# Patient Record
Sex: MALE | Race: WHITE | NOT HISPANIC OR LATINO | Employment: OTHER | URBAN - METROPOLITAN AREA
[De-identification: names, ages, dates, MRNs, and addresses within clinical notes are randomized per-mention and may not be internally consistent; named-entity substitution may affect disease eponyms.]

---

## 2017-04-20 RX ORDER — TAMSULOSIN HYDROCHLORIDE 0.4 MG/1
1 CAPSULE ORAL NIGHTLY
Qty: 90 CAPSULE | Refills: 0 | Status: SHIPPED | OUTPATIENT
Start: 2017-04-20 | End: 2017-05-05 | Stop reason: SDUPTHER

## 2017-04-20 RX ORDER — ROSUVASTATIN CALCIUM 5 MG/1
5 TABLET, COATED ORAL DAILY
Qty: 90 TABLET | Refills: 0 | Status: SHIPPED | OUTPATIENT
Start: 2017-04-20 | End: 2017-05-05 | Stop reason: SDUPTHER

## 2017-05-05 ENCOUNTER — OFFICE VISIT (OUTPATIENT)
Dept: FAMILY MEDICINE CLINIC | Facility: CLINIC | Age: 72
End: 2017-05-05

## 2017-05-05 VITALS
WEIGHT: 251 LBS | OXYGEN SATURATION: 97 % | HEIGHT: 66 IN | BODY MASS INDEX: 40.34 KG/M2 | TEMPERATURE: 98.3 F | DIASTOLIC BLOOD PRESSURE: 80 MMHG | HEART RATE: 74 BPM | SYSTOLIC BLOOD PRESSURE: 140 MMHG

## 2017-05-05 DIAGNOSIS — N40.1 BENIGN PROSTATIC HYPERPLASIA WITH LOWER URINARY TRACT SYMPTOMS, UNSPECIFIED MORPHOLOGY: ICD-10-CM

## 2017-05-05 DIAGNOSIS — F32.9 REACTIVE DEPRESSION: ICD-10-CM

## 2017-05-05 DIAGNOSIS — I10 ESSENTIAL HYPERTENSION: Primary | ICD-10-CM

## 2017-05-05 DIAGNOSIS — K63.5 HYPERPLASTIC COLON POLYP: ICD-10-CM

## 2017-05-05 DIAGNOSIS — E78.01 FAMILIAL HYPERCHOLESTEROLEMIA: ICD-10-CM

## 2017-05-05 DIAGNOSIS — R79.89 OTHER SPECIFIED ABNORMAL FINDINGS OF BLOOD CHEMISTRY: ICD-10-CM

## 2017-05-05 PROCEDURE — 99213 OFFICE O/P EST LOW 20 MIN: CPT | Performed by: FAMILY MEDICINE

## 2017-05-05 RX ORDER — LISINOPRIL 40 MG/1
40 TABLET ORAL DAILY
Qty: 90 TABLET | Refills: 1 | Status: SHIPPED | OUTPATIENT
Start: 2017-05-05 | End: 2017-10-16 | Stop reason: SDUPTHER

## 2017-05-05 RX ORDER — ROSUVASTATIN CALCIUM 5 MG/1
5 TABLET, COATED ORAL DAILY
Qty: 90 TABLET | Refills: 1 | Status: SHIPPED | OUTPATIENT
Start: 2017-05-05 | End: 2017-10-16 | Stop reason: SDUPTHER

## 2017-05-05 RX ORDER — SERTRALINE HYDROCHLORIDE 100 MG/1
50 TABLET, FILM COATED ORAL DAILY
Qty: 45 TABLET | Refills: 1 | Status: SHIPPED | OUTPATIENT
Start: 2017-05-05 | End: 2017-10-16 | Stop reason: SDUPTHER

## 2017-05-05 RX ORDER — TAMSULOSIN HYDROCHLORIDE 0.4 MG/1
1 CAPSULE ORAL NIGHTLY
Qty: 90 CAPSULE | Refills: 1 | Status: SHIPPED | OUTPATIENT
Start: 2017-05-05 | End: 2017-11-14 | Stop reason: SDUPTHER

## 2017-05-05 RX ORDER — TRIAMTERENE AND HYDROCHLOROTHIAZIDE 37.5; 25 MG/1; MG/1
1 TABLET ORAL DAILY
Qty: 90 TABLET | Refills: 1 | Status: SHIPPED | OUTPATIENT
Start: 2017-05-05 | End: 2017-10-16 | Stop reason: SDUPTHER

## 2017-06-06 ENCOUNTER — OFFICE VISIT (OUTPATIENT)
Dept: FAMILY MEDICINE CLINIC | Facility: CLINIC | Age: 72
End: 2017-06-06

## 2017-06-06 VITALS
HEIGHT: 67 IN | SYSTOLIC BLOOD PRESSURE: 118 MMHG | OXYGEN SATURATION: 95 % | BODY MASS INDEX: 37.35 KG/M2 | DIASTOLIC BLOOD PRESSURE: 78 MMHG | TEMPERATURE: 98.6 F | WEIGHT: 238 LBS | HEART RATE: 75 BPM

## 2017-06-06 DIAGNOSIS — J20.9 ACUTE BRONCHITIS, UNSPECIFIED ORGANISM: Primary | ICD-10-CM

## 2017-06-06 DIAGNOSIS — J06.9 ACUTE URI: ICD-10-CM

## 2017-06-06 PROCEDURE — 99213 OFFICE O/P EST LOW 20 MIN: CPT | Performed by: FAMILY MEDICINE

## 2017-06-06 RX ORDER — AMOXICILLIN AND CLAVULANATE POTASSIUM 875; 125 MG/1; MG/1
1 TABLET, FILM COATED ORAL 2 TIMES DAILY
Qty: 20 TABLET | Refills: 0 | Status: SHIPPED | OUTPATIENT
Start: 2017-06-06 | End: 2018-01-18

## 2017-06-06 NOTE — PROGRESS NOTES
"  Chief Complaint   Patient presents with   • Nasal Congestion     pt is been for over a month with congestion ,cought and tightness in ears        Subjective.../HPI  Patient present today with cough and congestion. Clear to opaque white sputum.    I have reviewed the patient's medical history in detail and updated the computerized patient record.    Family History   Problem Relation Age of Onset   • Stroke Father    • Heart attack Father        Social History     Social History   • Marital status:      Spouse name: N/A   • Number of children: N/A   • Years of education: N/A     Occupational History   • Not on file.     Social History Main Topics   • Smoking status: Former Smoker   • Smokeless tobacco: Not on file   • Alcohol use Yes   • Drug use: Not on file   • Sexual activity: Not on file     Other Topics Concern   • Not on file     Social History Narrative       Review of Systems:   Review of Systems   Constitutional: Negative.    HENT: Positive for congestion and sneezing.         Ear tightness   Respiratory: Positive for cough, chest tightness and shortness of breath.    Cardiovascular: Negative.    Gastrointestinal: Negative.    Endocrine: Negative.    Genitourinary: Negative.    Musculoskeletal: Negative.    Skin: Negative.    Allergic/Immunologic: Positive for environmental allergies.   Neurological: Negative.    Hematological: Negative.    Psychiatric/Behavioral: Negative.        Objective:  Vital Signs     Vitals:    06/06/17 1503   BP: 118/78   BP Location: Right arm   Patient Position: Sitting   Pulse: 75   Temp: 98.6 °F (37 °C)   TempSrc: Oral   SpO2: 95%   Weight: 238 lb (108 kg)   Height: 67\" (170.2 cm)     Physical Exam   Constitutional: He is oriented to person, place, and time. He appears well-developed and well-nourished.   HENT:   Head: Normocephalic.   Pond / cobblestoning.bilateral pink tm with decreased light reflex   Eyes: Pupils are equal, round, and reactive to light.   Neck: Normal " range of motion.   Cardiovascular: Normal rate, regular rhythm and normal heart sounds.    Pulmonary/Chest: Effort normal and breath sounds normal.   Abdominal: Soft.   Musculoskeletal: Normal range of motion.   Neurological: He is alert and oriented to person, place, and time.   Skin: Skin is warm and dry.        Results Review:      REVIEWED AND DISCUSSED CLINICAL RESULTS WITH PATIENT                          Current Outpatient Prescriptions:   •  acetaminophen (TYLENOL) 325 MG tablet, Take 650 mg by mouth., Disp: , Rfl:   •  aspirin 81 MG tablet, Take 81 mg by mouth daily., Disp: , Rfl:   •  lisinopril (PRINIVIL,ZESTRIL) 40 MG tablet, Take 1 tablet by mouth Daily., Disp: 90 tablet, Rfl: 1  •  rosuvastatin (CRESTOR) 5 MG tablet, Take 1 tablet by mouth Daily. Give generic when available, Disp: 90 tablet, Rfl: 1  •  sertraline (ZOLOFT) 100 MG tablet, Take 0.5 tablets by mouth Daily., Disp: 45 tablet, Rfl: 1  •  tamsulosin (FLOMAX) 0.4 MG capsule 24 hr capsule, Take 1 capsule by mouth Every Night., Disp: 90 capsule, Rfl: 1  •  triamterene-hydrochlorothiazide (MAXZIDE-25) 37.5-25 MG per tablet, Take 1 tablet by mouth Daily., Disp: 90 tablet, Rfl: 1  •  VENTOLIN  (90 BASE) MCG/ACT inhaler, 2 puffs Q4H for cough and wheeze, Disp: 1 inhaler, Rfl: 0  •  amoxicillin-clavulanate (AUGMENTIN) 875-125 MG per tablet, Take 1 tablet by mouth 2 (Two) Times a Day., Disp: 20 tablet, Rfl: 0  •  benzonatate (TESSALON) 200 MG capsule, One cap po TID for cough, Disp: 30 capsule, Rfl: 1  •  guaifenesin-codeine (CHERATUSSIN AC) 100-10 MG/5ML liquid, 10 ml po Q4H prn cough, Disp: 118 mL, Rfl: 1    Procedures    Assessment/Plan     Diagnoses and all orders for this visit:    Acute bronchitis, unspecified organism    Acute URI    Other orders  -     amoxicillin-clavulanate (AUGMENTIN) 875-125 MG per tablet; Take 1 tablet by mouth 2 (Two) Times a Day.         Jony Siddiqi Jr., MD  06/06/17  3:56 PM

## 2017-09-14 DIAGNOSIS — K63.5 HYPERPLASTIC COLON POLYP: ICD-10-CM

## 2017-09-14 DIAGNOSIS — E78.01 FAMILIAL HYPERCHOLESTEROLEMIA: ICD-10-CM

## 2017-09-14 DIAGNOSIS — Z00.00 ROUTINE HEALTH MAINTENANCE: ICD-10-CM

## 2017-09-14 DIAGNOSIS — M48.061 SPINAL STENOSIS OF LUMBAR REGION: ICD-10-CM

## 2017-09-14 DIAGNOSIS — Z79.899 ENCOUNTER FOR LONG-TERM (CURRENT) USE OF MEDICATIONS: ICD-10-CM

## 2017-09-14 DIAGNOSIS — I10 ESSENTIAL HYPERTENSION: Primary | ICD-10-CM

## 2017-09-14 DIAGNOSIS — N40.1 BENIGN PROSTATIC HYPERPLASIA WITH LOWER URINARY TRACT SYMPTOMS, UNSPECIFIED MORPHOLOGY: ICD-10-CM

## 2017-09-15 LAB
ALBUMIN SERPL-MCNC: 4.2 G/DL (ref 3.5–5.2)
ALBUMIN/GLOB SERPL: 1.5 G/DL
ALP SERPL-CCNC: 54 U/L (ref 39–117)
ALT SERPL-CCNC: 24 U/L (ref 1–41)
AST SERPL-CCNC: 27 U/L (ref 1–40)
BASOPHILS # BLD AUTO: 0.07 10*3/MM3 (ref 0–0.2)
BASOPHILS NFR BLD AUTO: 1.2 % (ref 0–1.5)
BILIRUB SERPL-MCNC: 0.3 MG/DL (ref 0.1–1.2)
BUN SERPL-MCNC: 23 MG/DL (ref 8–23)
BUN/CREAT SERPL: 21.1 (ref 7–25)
CALCIUM SERPL-MCNC: 9.8 MG/DL (ref 8.6–10.5)
CHLORIDE SERPL-SCNC: 99 MMOL/L (ref 98–107)
CHOLEST SERPL-MCNC: 152 MG/DL (ref 0–200)
CO2 SERPL-SCNC: 31.9 MMOL/L (ref 22–29)
CREAT SERPL-MCNC: 1.09 MG/DL (ref 0.76–1.27)
EOSINOPHIL # BLD AUTO: 0.18 10*3/MM3 (ref 0–0.7)
EOSINOPHIL NFR BLD AUTO: 3 % (ref 0.3–6.2)
ERYTHROCYTE [DISTWIDTH] IN BLOOD BY AUTOMATED COUNT: 19 % (ref 11.5–14.5)
FT4I SERPL CALC-MCNC: 1.8 (ref 1.2–4.9)
GLOBULIN SER CALC-MCNC: 2.8 GM/DL
GLUCOSE SERPL-MCNC: 104 MG/DL (ref 65–99)
HBA1C MFR BLD: 5.41 % (ref 4.8–5.6)
HCT VFR BLD AUTO: 38.8 % (ref 40.4–52.2)
HDLC SERPL-MCNC: 59 MG/DL (ref 40–60)
HGB BLD-MCNC: 11.6 G/DL (ref 13.7–17.6)
IMM GRANULOCYTES # BLD: 0 10*3/MM3 (ref 0–0.03)
IMM GRANULOCYTES NFR BLD: 0 % (ref 0–0.5)
LDLC SERPL CALC-MCNC: 71 MG/DL (ref 0–100)
LDLC/HDLC SERPL: 1.2 {RATIO}
LYMPHOCYTES # BLD AUTO: 1.7 10*3/MM3 (ref 0.9–4.8)
LYMPHOCYTES NFR BLD AUTO: 28.5 % (ref 19.6–45.3)
MCH RBC QN AUTO: 24.8 PG (ref 27–32.7)
MCHC RBC AUTO-ENTMCNC: 29.9 G/DL (ref 32.6–36.4)
MCV RBC AUTO: 83.1 FL (ref 79.8–96.2)
MONOCYTES # BLD AUTO: 0.48 10*3/MM3 (ref 0.2–1.2)
MONOCYTES NFR BLD AUTO: 8 % (ref 5–12)
NEUTROPHILS # BLD AUTO: 3.54 10*3/MM3 (ref 1.9–8.1)
NEUTROPHILS NFR BLD AUTO: 59.3 % (ref 42.7–76)
PLATELET # BLD AUTO: 256 10*3/MM3 (ref 140–500)
POTASSIUM SERPL-SCNC: 4.6 MMOL/L (ref 3.5–5.2)
PROT SERPL-MCNC: 7 G/DL (ref 6–8.5)
PSA SERPL-MCNC: 1.88 NG/ML (ref 0–4)
RBC # BLD AUTO: 4.67 10*6/MM3 (ref 4.6–6)
SODIUM SERPL-SCNC: 141 MMOL/L (ref 136–145)
T3RU NFR SERPL: 32 % (ref 24–39)
T4 SERPL-MCNC: 5.5 UG/DL (ref 4.5–12)
TRIGL SERPL-MCNC: 112 MG/DL (ref 0–150)
TSH SERPL DL<=0.005 MIU/L-ACNC: 4.81 UIU/ML (ref 0.45–4.5)
VLDLC SERPL CALC-MCNC: 22.4 MG/DL (ref 5–40)
WBC # BLD AUTO: 5.97 10*3/MM3 (ref 4.5–10.7)

## 2017-10-16 RX ORDER — SERTRALINE HYDROCHLORIDE 100 MG/1
50 TABLET, FILM COATED ORAL DAILY
Qty: 45 TABLET | Refills: 2 | Status: SHIPPED | OUTPATIENT
Start: 2017-10-16 | End: 2018-02-12 | Stop reason: SDUPTHER

## 2017-10-16 RX ORDER — ROSUVASTATIN CALCIUM 5 MG/1
5 TABLET, COATED ORAL DAILY
Qty: 90 TABLET | Refills: 2 | Status: SHIPPED | OUTPATIENT
Start: 2017-10-16 | End: 2018-05-16 | Stop reason: SDUPTHER

## 2017-10-16 RX ORDER — TRIAMTERENE AND HYDROCHLOROTHIAZIDE 37.5; 25 MG/1; MG/1
1 TABLET ORAL DAILY
Qty: 90 TABLET | Refills: 2 | Status: SHIPPED | OUTPATIENT
Start: 2017-10-16 | End: 2018-05-16 | Stop reason: SDUPTHER

## 2017-10-16 RX ORDER — LISINOPRIL 40 MG/1
40 TABLET ORAL DAILY
Qty: 90 TABLET | Refills: 2 | Status: SHIPPED | OUTPATIENT
Start: 2017-10-16 | End: 2018-07-27 | Stop reason: SDUPTHER

## 2017-11-14 RX ORDER — TAMSULOSIN HYDROCHLORIDE 0.4 MG/1
1 CAPSULE ORAL NIGHTLY
Qty: 90 CAPSULE | Refills: 1 | Status: SHIPPED | OUTPATIENT
Start: 2017-11-14 | End: 2018-05-16 | Stop reason: SDUPTHER

## 2017-12-05 DIAGNOSIS — D64.9 ANEMIA, UNSPECIFIED TYPE: Primary | ICD-10-CM

## 2017-12-06 NOTE — PROGRESS NOTES
I told the patient results of his lab tests.  He does have some anemia.  He denies any blood in his stools other than today he had some hemorrhoidal bleeding.  He has not had that over the last several months.  He did have a colon polyp in the past when he underwent a colonoscopy by Dr. Alexander.  He does eat red meats and leafy green vegetables.  I will refer him to gastroenterology for further evaluation.

## 2018-01-18 ENCOUNTER — OFFICE VISIT (OUTPATIENT)
Dept: GASTROENTEROLOGY | Facility: CLINIC | Age: 73
End: 2018-01-18

## 2018-01-18 VITALS
HEIGHT: 66 IN | BODY MASS INDEX: 38.99 KG/M2 | DIASTOLIC BLOOD PRESSURE: 80 MMHG | SYSTOLIC BLOOD PRESSURE: 132 MMHG | WEIGHT: 242.6 LBS | TEMPERATURE: 97.7 F

## 2018-01-18 DIAGNOSIS — D50.0 IRON DEFICIENCY ANEMIA DUE TO CHRONIC BLOOD LOSS: Primary | ICD-10-CM

## 2018-01-18 DIAGNOSIS — K21.9 GASTROESOPHAGEAL REFLUX DISEASE, ESOPHAGITIS PRESENCE NOT SPECIFIED: ICD-10-CM

## 2018-01-18 PROCEDURE — 99204 OFFICE O/P NEW MOD 45 MIN: CPT | Performed by: INTERNAL MEDICINE

## 2018-01-18 NOTE — PROGRESS NOTES
Chief Complaint   Patient presents with   • Anemia       History of Present Illness: 72 yr old male here for recurrent iron def anemia. No rectal bleeding or melena, though he had small amount of rectal bleeding when passing a hard stool. No abdominal or chest pain. He takes Prevacid OTC one/day for heartburn.  No dysphagia. He was on ibuprofen prn but has backed off of that. No nausea or vomting. No diarrhea, sometimes constipation. His weight is stable.     Past Medical History:   Diagnosis Date   • Allergic    • Anxiety    • High cholesterol    • Hypertension        Past Surgical History:   Procedure Laterality Date   • COLONOSCOPY  approx 2012    • TONSILLECTOMY           Current Outpatient Prescriptions:   •  acetaminophen (TYLENOL) 325 MG tablet, Take 650 mg by mouth., Disp: , Rfl:   •  aspirin 81 MG tablet, Take 81 mg by mouth daily., Disp: , Rfl:   •  lisinopril (PRINIVIL,ZESTRIL) 40 MG tablet, Take 1 tablet by mouth Daily., Disp: 90 tablet, Rfl: 2  •  rosuvastatin (CRESTOR) 5 MG tablet, Take 1 tablet by mouth Daily. Give generic when available, Disp: 90 tablet, Rfl: 2  •  sertraline (ZOLOFT) 100 MG tablet, Take 0.5 tablets by mouth Daily., Disp: 45 tablet, Rfl: 2  •  tamsulosin (FLOMAX) 0.4 MG capsule 24 hr capsule, Take 1 capsule by mouth Every Night., Disp: 90 capsule, Rfl: 1  •  triamterene-hydrochlorothiazide (MAXZIDE-25) 37.5-25 MG per tablet, Take 1 tablet by mouth Daily., Disp: 90 tablet, Rfl: 2  •  VENTOLIN  (90 BASE) MCG/ACT inhaler, 2 puffs Q4H for cough and wheeze, Disp: 1 inhaler, Rfl: 0  •  benzonatate (TESSALON) 200 MG capsule, One cap po TID for cough, Disp: 30 capsule, Rfl: 1  •  ciprofloxacin (CILOXAN) 0.3 % ophthalmic solution, Administer 1 drop into the left eye Every 4 (Four) Hours., Disp: 5 mL, Rfl: 0  •  guaifenesin-codeine (CHERATUSSIN AC) 100-10 MG/5ML liquid, 10 ml po Q4H prn cough, Disp: 118 mL, Rfl: 1    Allergies   Allergen Reactions   • Amoxicillin Itching   • Levaquin  [Levofloxacin] Nausea And Vomiting       Family History   Problem Relation Age of Onset   • Stroke Father    • Heart attack Father        Social History     Social History   • Marital status:      Spouse name: N/A   • Number of children: N/A   • Years of education: N/A     Occupational History   • Not on file.     Social History Main Topics   • Smoking status: Former Smoker   • Smokeless tobacco: Not on file   • Alcohol use Yes   • Drug use: Not on file   • Sexual activity: Not on file     Other Topics Concern   • Not on file     Social History Narrative       Review of Systems   Endocrine: Positive for polyphagia.   All other systems reviewed and are negative.      Vitals:    01/18/18 0926   BP: 132/80   Temp: 97.7 °F (36.5 °C)       Physical Exam   Constitutional: He is oriented to person, place, and time. He appears well-developed and well-nourished. No distress.   HENT:   Head: Normocephalic and atraumatic. Hair is normal.   Right Ear: Hearing, tympanic membrane, external ear and ear canal normal.   Left Ear: Hearing, tympanic membrane, external ear and ear canal normal.   Nose: No sinus tenderness or nasal deformity.   Mouth/Throat: Uvula is midline, oropharynx is clear and moist and mucous membranes are normal. No oral lesions. No uvula swelling.   Eyes: Conjunctivae, EOM and lids are normal. Pupils are equal, round, and reactive to light. Right eye exhibits no discharge. Left eye exhibits no discharge. No scleral icterus. Right eye exhibits normal extraocular motion and no nystagmus. Left eye exhibits normal extraocular motion and no nystagmus.   Neck: Normal range of motion. Neck supple. No JVD present. No thyromegaly present.   Cardiovascular: Normal rate, regular rhythm, normal heart sounds, intact distal pulses and normal pulses.  Exam reveals no gallop.    No murmur heard.  Pulmonary/Chest: Effort normal and breath sounds normal. No respiratory distress. He has no wheezes. He has no rales. He  exhibits no tenderness.   Abdominal: Soft. Bowel sounds are normal. He exhibits no distension and no mass. There is no tenderness. There is no guarding. No hernia.   Genitourinary: Rectal exam shows guaiac negative stool.   Genitourinary Comments: Normal anorectal exxam.   Musculoskeletal: Normal range of motion. He exhibits no edema, tenderness or deformity.   Lymphadenopathy:     He has no cervical adenopathy.   Neurological: He is alert and oriented to person, place, and time. He has normal reflexes. He displays normal reflexes. No cranial nerve deficit. He exhibits normal muscle tone. Coordination normal.   Skin: Skin is warm and dry. No rash noted. He is not diaphoretic.   Psychiatric: He has a normal mood and affect. His behavior is normal. Judgment and thought content normal.   Vitals reviewed.      Dick was seen today for anemia.    Diagnoses and all orders for this visit:    Iron deficiency anemia due to chronic blood loss  -     Case Request; Standing  -     Case Request  -     CBC & Differential  -     Comprehensive Metabolic Panel  -     Celiac Ab tTG DGP TIgA  -     Ferritin  -     Iron Profile  -     Vitamin B12  -     Folate RBC  -     Reticulocytes    Gastroesophageal reflux disease, esophagitis presence not specified  -     CBC & Differential  -     Comprehensive Metabolic Panel  -     Celiac Ab tTG DGP TIgA  -     Ferritin  -     Iron Profile  -     Vitamin B12  -     Folate RBC  -     Reticulocytes    Other orders  -     Implement Anesthesia orders day of procedure.; Standing  -     Obtain informed consent; Standing  -     Verify bowel prep was successful; Standing  -     Give tap water enema if bowel prep was insufficient; Standing     Assessment:  1) Recurrent iron deficiency anemia.   2) h/o colon polyp  3) GERD    Recommendations:  1) EGD and Colonoscopy.  2) Labs and anemia labs      No Follow-up on file.    Usman Roper MD  1/18/2018

## 2018-01-19 LAB
ALBUMIN SERPL-MCNC: 4.4 G/DL (ref 3.5–5.2)
ALBUMIN/GLOB SERPL: 1.6 G/DL
ALP SERPL-CCNC: 57 U/L (ref 39–117)
ALT SERPL-CCNC: 21 U/L (ref 1–41)
AST SERPL-CCNC: 17 U/L (ref 1–40)
BASOPHILS # BLD AUTO: 0.04 10*3/MM3 (ref 0–0.2)
BASOPHILS NFR BLD AUTO: 0.5 % (ref 0–1.5)
BILIRUB SERPL-MCNC: 0.4 MG/DL (ref 0.1–1.2)
BUN SERPL-MCNC: 28 MG/DL (ref 8–23)
BUN/CREAT SERPL: 23.7 (ref 7–25)
CALCIUM SERPL-MCNC: 9.2 MG/DL (ref 8.6–10.5)
CHLORIDE SERPL-SCNC: 98 MMOL/L (ref 98–107)
CO2 SERPL-SCNC: 33.8 MMOL/L (ref 22–29)
CREAT SERPL-MCNC: 1.18 MG/DL (ref 0.76–1.27)
EOSINOPHIL # BLD AUTO: 0.15 10*3/MM3 (ref 0–0.7)
EOSINOPHIL NFR BLD AUTO: 1.9 % (ref 0.3–6.2)
ERYTHROCYTE [DISTWIDTH] IN BLOOD BY AUTOMATED COUNT: 16.3 % (ref 11.5–14.5)
FERRITIN SERPL-MCNC: 21.43 NG/ML (ref 30–400)
FOLATE BLD-MCNC: >620 NG/ML
FOLATE RBC-MCNC: >1439 NG/ML
GLIADIN PEPTIDE IGA SER-ACNC: 3 UNITS (ref 0–19)
GLIADIN PEPTIDE IGG SER-ACNC: 3 UNITS (ref 0–19)
GLOBULIN SER CALC-MCNC: 2.8 GM/DL
GLUCOSE SERPL-MCNC: 87 MG/DL (ref 65–99)
HCT VFR BLD AUTO: 43.1 % (ref 40.4–52.2)
HGB BLD-MCNC: 13.8 G/DL (ref 13.7–17.6)
IGA SERPL-MCNC: 171 MG/DL (ref 61–437)
IMM GRANULOCYTES # BLD: 0.02 10*3/MM3 (ref 0–0.03)
IMM GRANULOCYTES NFR BLD: 0.3 % (ref 0–0.5)
IRON SATN MFR SERPL: 22 % (ref 20–50)
IRON SERPL-MCNC: 89 MCG/DL (ref 59–158)
LYMPHOCYTES # BLD AUTO: 1.54 10*3/MM3 (ref 0.9–4.8)
LYMPHOCYTES NFR BLD AUTO: 19.5 % (ref 19.6–45.3)
MCH RBC QN AUTO: 28 PG (ref 27–32.7)
MCHC RBC AUTO-ENTMCNC: 32 G/DL (ref 32.6–36.4)
MCV RBC AUTO: 87.4 FL (ref 79.8–96.2)
MONOCYTES # BLD AUTO: 0.68 10*3/MM3 (ref 0.2–1.2)
MONOCYTES NFR BLD AUTO: 8.6 % (ref 5–12)
NEUTROPHILS # BLD AUTO: 5.47 10*3/MM3 (ref 1.9–8.1)
NEUTROPHILS NFR BLD AUTO: 69.2 % (ref 42.7–76)
PLATELET # BLD AUTO: 230 10*3/MM3 (ref 140–500)
POTASSIUM SERPL-SCNC: 4.3 MMOL/L (ref 3.5–5.2)
PROT SERPL-MCNC: 7.2 G/DL (ref 6–8.5)
RBC # BLD AUTO: 4.93 10*6/MM3 (ref 4.6–6)
RETICS/RBC NFR AUTO: 1.01 % (ref 0.5–1.5)
SODIUM SERPL-SCNC: 140 MMOL/L (ref 136–145)
TIBC SERPL-MCNC: 404 MCG/DL
TTG IGA SER-ACNC: <2 U/ML (ref 0–3)
TTG IGG SER-ACNC: <2 U/ML (ref 0–5)
UIBC SERPL-MCNC: 315 MCG/DL
VIT B12 SERPL-MCNC: 447 PG/ML (ref 211–946)
WBC # BLD AUTO: 7.9 10*3/MM3 (ref 4.5–10.7)

## 2018-01-21 NOTE — PROGRESS NOTES
Tell him that his labs show that he is iron deficient but not anemic. I would recommend that he take some over the counter iron pills. He could take iron sulfate 325 mg one pill daily and I would take about two months worth of over the counter iron pills. [email or web address]

## 2018-01-22 ENCOUNTER — TELEPHONE (OUTPATIENT)
Dept: GASTROENTEROLOGY | Facility: CLINIC | Age: 73
End: 2018-01-22

## 2018-01-22 NOTE — TELEPHONE ENCOUNTER
----- Message from Usman Roper MD sent at 1/21/2018  5:16 PM EST -----  Tell him that his labs show that he is iron deficient but not anemic. I would recommend that he take some over the counter iron pills. He could take iron sulfate 325 mg one pill daily and I would take about two months worth of over the counter iron pills. kj

## 2018-01-23 NOTE — TELEPHONE ENCOUNTER
Call returned to pt.  Advise per DR Roper that labs show that iron deficient, but not anemic.  Recommend OTC iron sulfate 325 mg 1 tab daily for 2 mo's.  Pt verb understanding.

## 2018-02-05 ENCOUNTER — TELEPHONE (OUTPATIENT)
Dept: GASTROENTEROLOGY | Facility: CLINIC | Age: 73
End: 2018-02-05

## 2018-02-05 NOTE — TELEPHONE ENCOUNTER
----- Message from Trisha Paredes sent at 2/5/2018  8:55 AM EST -----  Regarding: PT CALLED - QUESTION  Contact: [phone]  PT DIDN'T STOP HIS IRON FOR 5 DAYS. HE TOOK YESTERDAY. [facility] FOR PROCEDURE 10AM TOMORROW. SHOULD HE R/S?

## 2018-02-05 NOTE — TELEPHONE ENCOUNTER
Dr Roper covering in hospital.  Spoke with Dr Peck who advised that the pt should be fine for his scope tomorrow even though he had taken his iron till yesterday.      Called pt and advised of the above. Pt verb understanding and states he will not take the iron today.  Update sent to Dr Roper.

## 2018-02-06 ENCOUNTER — ANESTHESIA EVENT (OUTPATIENT)
Dept: GASTROENTEROLOGY | Facility: HOSPITAL | Age: 73
End: 2018-02-06

## 2018-02-06 ENCOUNTER — ANESTHESIA (OUTPATIENT)
Dept: GASTROENTEROLOGY | Facility: HOSPITAL | Age: 73
End: 2018-02-06

## 2018-02-06 ENCOUNTER — HOSPITAL ENCOUNTER (OUTPATIENT)
Facility: HOSPITAL | Age: 73
Setting detail: HOSPITAL OUTPATIENT SURGERY
Discharge: HOME | End: 2018-02-06
Attending: INTERNAL MEDICINE | Admitting: INTERNAL MEDICINE

## 2018-02-06 VITALS
RESPIRATION RATE: 15 BRPM | DIASTOLIC BLOOD PRESSURE: 92 MMHG | WEIGHT: 237 LBS | HEIGHT: 66 IN | OXYGEN SATURATION: 97 % | HEART RATE: 82 BPM | SYSTOLIC BLOOD PRESSURE: 142 MMHG | BODY MASS INDEX: 38.09 KG/M2 | TEMPERATURE: 98 F

## 2018-02-06 DIAGNOSIS — D50.0 IRON DEFICIENCY ANEMIA DUE TO CHRONIC BLOOD LOSS: ICD-10-CM

## 2018-02-06 PROCEDURE — 25010000002 PROPOFOL 10 MG/ML EMULSION: Performed by: ANESTHESIOLOGY

## 2018-02-06 PROCEDURE — 45380 COLONOSCOPY AND BIOPSY: CPT | Performed by: INTERNAL MEDICINE

## 2018-02-06 PROCEDURE — 88312 SPECIAL STAINS GROUP 1: CPT | Performed by: INTERNAL MEDICINE

## 2018-02-06 PROCEDURE — 88305 TISSUE EXAM BY PATHOLOGIST: CPT | Performed by: INTERNAL MEDICINE

## 2018-02-06 PROCEDURE — 43239 EGD BIOPSY SINGLE/MULTIPLE: CPT | Performed by: INTERNAL MEDICINE

## 2018-02-06 PROCEDURE — 87081 CULTURE SCREEN ONLY: CPT | Performed by: INTERNAL MEDICINE

## 2018-02-06 RX ORDER — SODIUM CHLORIDE, SODIUM LACTATE, POTASSIUM CHLORIDE, CALCIUM CHLORIDE 600; 310; 30; 20 MG/100ML; MG/100ML; MG/100ML; MG/100ML
1000 INJECTION, SOLUTION INTRAVENOUS CONTINUOUS PRN
Status: DISCONTINUED | OUTPATIENT
Start: 2018-02-06 | End: 2018-02-06 | Stop reason: HOSPADM

## 2018-02-06 RX ORDER — PROPOFOL 10 MG/ML
VIAL (ML) INTRAVENOUS AS NEEDED
Status: DISCONTINUED | OUTPATIENT
Start: 2018-02-06 | End: 2018-02-06 | Stop reason: SURG

## 2018-02-06 RX ORDER — FERROUS SULFATE 325(65) MG
325 TABLET ORAL
COMMUNITY
End: 2018-02-12

## 2018-02-06 RX ORDER — LIDOCAINE HYDROCHLORIDE 20 MG/ML
INJECTION, SOLUTION INFILTRATION; PERINEURAL AS NEEDED
Status: DISCONTINUED | OUTPATIENT
Start: 2018-02-06 | End: 2018-02-06 | Stop reason: SURG

## 2018-02-06 RX ORDER — LIDOCAINE HYDROCHLORIDE 10 MG/ML
0.5 INJECTION, SOLUTION INFILTRATION; PERINEURAL ONCE AS NEEDED
Status: DISCONTINUED | OUTPATIENT
Start: 2018-02-06 | End: 2018-02-06 | Stop reason: HOSPADM

## 2018-02-06 RX ORDER — PROPOFOL 10 MG/ML
VIAL (ML) INTRAVENOUS CONTINUOUS PRN
Status: DISCONTINUED | OUTPATIENT
Start: 2018-02-06 | End: 2018-02-06 | Stop reason: SURG

## 2018-02-06 RX ORDER — SODIUM CHLORIDE 0.9 % (FLUSH) 0.9 %
3 SYRINGE (ML) INJECTION AS NEEDED
Status: DISCONTINUED | OUTPATIENT
Start: 2018-02-06 | End: 2018-02-06 | Stop reason: HOSPADM

## 2018-02-06 RX ADMIN — PROPOFOL 140 MCG/KG/MIN: 10 INJECTION, EMULSION INTRAVENOUS at 09:56

## 2018-02-06 RX ADMIN — PROPOFOL 150 MG: 10 INJECTION, EMULSION INTRAVENOUS at 09:56

## 2018-02-06 RX ADMIN — SODIUM CHLORIDE, POTASSIUM CHLORIDE, SODIUM LACTATE AND CALCIUM CHLORIDE 1000 ML: 600; 310; 30; 20 INJECTION, SOLUTION INTRAVENOUS at 09:43

## 2018-02-06 RX ADMIN — LIDOCAINE HYDROCHLORIDE 50 MG: 20 INJECTION, SOLUTION INFILTRATION; PERINEURAL at 09:56

## 2018-02-06 NOTE — PLAN OF CARE
Problem: Patient Care Overview (Adult)  Goal: Adult Individualization and Mutuality  Outcome: Ongoing (interventions implemented as appropriate)   02/06/18 0933   Individualization   Patient Specific Interventions denies     Goal: Discharge Needs Assessment  Outcome: Ongoing (interventions implemented as appropriate)   02/06/18 0933   Discharge Needs Assessment   Concerns To Be Addressed no discharge needs identified   Discharge Disposition home or self-care   Self-Care   Equipment Currently Used at Home none       Problem: GI Endoscopy (Adult)  Goal: Signs and Symptoms of Listed Potential Problems Will be Absent or Manageable (GI Endoscopy)  Outcome: Ongoing (interventions implemented as appropriate)   02/06/18 0933   GI Endoscopy   Problems Assessed (GI Endoscopy) pain;bleeding;fluid imbalance;hypoxia/hypoxemia   Problems Present (GI Endoscopy) none

## 2018-02-06 NOTE — ANESTHESIA PREPROCEDURE EVALUATION
Anesthesia Evaluation     Patient summary reviewed                Airway   Mallampati: III  Dental      Pulmonary     breath sounds clear to auscultation  Cardiovascular   Exercise tolerance: good (4-7 METS)    Rhythm: regular  Rate: normal        Neuro/Psych  GI/Hepatic/Renal/Endo      Musculoskeletal     Abdominal    Substance History      OB/GYN          Other                        Anesthesia Plan    ASA 2     MAC     intravenous induction   Anesthetic plan and risks discussed with patient.

## 2018-02-06 NOTE — ANESTHESIA POSTPROCEDURE EVALUATION
Patient: Dick Galindo    Procedure Summary     Date Anesthesia Start Anesthesia Stop Room / Location    02/06/18 0952 1031  [facility] /  [facility]       Procedure Diagnosis Surgeon Provider    ESOPHAGOGASTRODUODENOSCOPY WITH COLD BIOPSIES (N/A Esophagus); COLONOSCOPY INTO CEECUM AND TERMINAL MILEUM WITH COLD BIOPSY POLYPECTOMIES & COLD BIOIPSIES (N/A ) Iron deficiency anemia due to chronic blood loss  (Iron deficiency anemia due to chronic blood loss [D50.0]) MD Pranay Christianson MD          Anesthesia Type: MAC  Last vitals  BP   133/88 (02/06/18 1036)   Temp   36.6 °C (97.9 °F) (02/06/18 0935)   Pulse   88 (02/06/18 1036)   Resp   18 (02/06/18 1036)     SpO2   98 % (02/06/18 1036)     Post Anesthesia Care and Evaluation    Patient location during evaluation: PACU  Patient participation: complete - patient participated  Level of consciousness: awake and alert  Pain score: 0  Pain management: adequate  Airway patency: patent  Anesthetic complications: No anesthetic complications    Cardiovascular status: acceptable  Respiratory status: acceptable  Hydration status: acceptable

## 2018-02-06 NOTE — H&P
Chief Complaint   Patient presents with   • Anemia         History of Present Illness: 72 yr old male here for recurrent iron def anemia. No rectal bleeding or melena, though he had small amount of rectal bleeding when passing a hard stool. No abdominal or chest pain. He takes Prevacid OTC one/day for heartburn.  No dysphagia. He was on ibuprofen prn but has backed off of that. No nausea or vomting. No diarrhea, sometimes constipation. His weight is stable.       Medical History         Past Medical History:   Diagnosis Date   • Allergic     • Anxiety     • High cholesterol     • Hypertension               Surgical History    Past Surgical History:   Procedure Laterality Date   • COLONOSCOPY   approx 2012    • TONSILLECTOMY                   Current Outpatient Prescriptions:   •  acetaminophen (TYLENOL) 325 MG tablet, Take 650 mg by mouth., Disp: , Rfl:   •  aspirin 81 MG tablet, Take 81 mg by mouth daily., Disp: , Rfl:   •  lisinopril (PRINIVIL,ZESTRIL) 40 MG tablet, Take 1 tablet by mouth Daily., Disp: 90 tablet, Rfl: 2  •  rosuvastatin (CRESTOR) 5 MG tablet, Take 1 tablet by mouth Daily. Give generic when available, Disp: 90 tablet, Rfl: 2  •  sertraline (ZOLOFT) 100 MG tablet, Take 0.5 tablets by mouth Daily., Disp: 45 tablet, Rfl: 2  •  tamsulosin (FLOMAX) 0.4 MG capsule 24 hr capsule, Take 1 capsule by mouth Every Night., Disp: 90 capsule, Rfl: 1  •  triamterene-hydrochlorothiazide (MAXZIDE-25) 37.5-25 MG per tablet, Take 1 tablet by mouth Daily., Disp: 90 tablet, Rfl: 2  •  VENTOLIN  (90 BASE) MCG/ACT inhaler, 2 puffs Q4H for cough and wheeze, Disp: 1 inhaler, Rfl: 0  •  benzonatate (TESSALON) 200 MG capsule, One cap po TID for cough, Disp: 30 capsule, Rfl: 1  •  ciprofloxacin (CILOXAN) 0.3 % ophthalmic solution, Administer 1 drop into the left eye Every 4 (Four) Hours., Disp: 5 mL, Rfl: 0  •  guaifenesin-codeine (CHERATUSSIN AC) 100-10 MG/5ML liquid, 10 ml po Q4H prn cough, Disp: 118 mL, Rfl: 1           Allergies   Allergen Reactions   • Amoxicillin Itching   • Levaquin [Levofloxacin] Nausea And Vomiting               Family History   Problem Relation Age of Onset   • Stroke Father     • Heart attack Father            Social History    Social History            Social History   • Marital status:        Spouse name: N/A   • Number of children: N/A   • Years of education: N/A          Occupational History   • Not on file.           Social History Main Topics   • Smoking status: Former Smoker   • Smokeless tobacco: Not on file   • Alcohol use Yes   • Drug use: Not on file   • Sexual activity: Not on file           Other Topics Concern   • Not on file      Social History Narrative            Review of Systems   Endocrine: Positive for polyphagia.   All other systems reviewed and are negative.            Vitals:     01/18/18 0926   BP: 132/80   Temp: 97.7 °F (36.5 °C)         Physical Exam   Constitutional: He is oriented to person, place, and time. He appears well-developed and well-nourished. No distress.   HENT:   Head: Normocephalic and atraumatic. Hair is normal.   Right Ear: Hearing, tympanic membrane, external ear and ear canal normal.   Left Ear: Hearing, tympanic membrane, external ear and ear canal normal.   Nose: No sinus tenderness or nasal deformity.   Mouth/Throat: Uvula is midline, oropharynx is clear and moist and mucous membranes are normal. No oral lesions. No uvula swelling.   Eyes: Conjunctivae, EOM and lids are normal. Pupils are equal, round, and reactive to light. Right eye exhibits no discharge. Left eye exhibits no discharge. No scleral icterus. Right eye exhibits normal extraocular motion and no nystagmus. Left eye exhibits normal extraocular motion and no nystagmus.   Neck: Normal range of motion. Neck supple. No JVD present. No thyromegaly present.   Cardiovascular: Normal rate, regular rhythm, normal heart sounds, intact distal pulses and normal pulses.  Exam reveals no gallop.     No murmur heard.  Pulmonary/Chest: Effort normal and breath sounds normal. No respiratory distress. He has no wheezes. He has no rales. He exhibits no tenderness.   Abdominal: Soft. Bowel sounds are normal. He exhibits no distension and no mass. There is no tenderness. There is no guarding. No hernia.   Genitourinary: Rectal exam shows guaiac negative stool.   Genitourinary Comments: Normal anorectal exxam.   Musculoskeletal: Normal range of motion. He exhibits no edema, tenderness or deformity.   Lymphadenopathy:     He has no cervical adenopathy.   Neurological: He is alert and oriented to person, place, and time. He has normal reflexes. He displays normal reflexes. No cranial nerve deficit. He exhibits normal muscle tone. Coordination normal.   Skin: Skin is warm and dry. No rash noted. He is not diaphoretic.   Psychiatric: He has a normal mood and affect. His behavior is normal. Judgment and thought content normal.   Vitals reviewed.        Dick was seen today for anemia.     Diagnoses and all orders for this visit:     Iron deficiency anemia due to chronic blood loss  -     Case Request; Standing  -     Case Request  -     CBC & Differential  -     Comprehensive Metabolic Panel  -     Celiac Ab tTG DGP TIgA  -     Ferritin  -     Iron Profile  -     Vitamin B12  -     Folate RBC  -     Reticulocytes     Gastroesophageal reflux disease, esophagitis presence not specified  -     CBC & Differential  -     Comprehensive Metabolic Panel  -     Celiac Ab tTG DGP TIgA  -     Ferritin  -     Iron Profile  -     Vitamin B12  -     Folate RBC  -     Reticulocytes     Other orders  -     Implement Anesthesia orders day of procedure.; Standing  -     Obtain informed consent; Standing  -     Verify bowel prep was successful; Standing  -     Give tap water enema if bowel prep was insufficient; Standing     Assessment:  1) Recurrent iron deficiency anemia.   2) h/o colon polyp  3) GERD     Recommendations:  1) EGD and  Colonoscopy.  2) Labs and anemia labs        No Follow-up on file.     2/6/18 - No change from the above H and P.  Usman Roper MD

## 2018-02-07 LAB
CYTO UR: NORMAL
LAB AP CASE REPORT: NORMAL
Lab: NORMAL
PATH REPORT.FINAL DX SPEC: NORMAL
PATH REPORT.GROSS SPEC: NORMAL

## 2018-02-08 LAB — UREASE TISS QL: NEGATIVE

## 2018-02-12 ENCOUNTER — OFFICE VISIT (OUTPATIENT)
Dept: FAMILY MEDICINE CLINIC | Facility: CLINIC | Age: 73
End: 2018-02-12

## 2018-02-12 VITALS
HEART RATE: 77 BPM | OXYGEN SATURATION: 94 % | DIASTOLIC BLOOD PRESSURE: 68 MMHG | HEIGHT: 66 IN | WEIGHT: 245.9 LBS | TEMPERATURE: 98.2 F | SYSTOLIC BLOOD PRESSURE: 134 MMHG | BODY MASS INDEX: 39.52 KG/M2

## 2018-02-12 DIAGNOSIS — F33.42 RECURRENT MAJOR DEPRESSIVE DISORDER, IN FULL REMISSION (HCC): ICD-10-CM

## 2018-02-12 DIAGNOSIS — D50.0 IRON DEFICIENCY ANEMIA DUE TO CHRONIC BLOOD LOSS: ICD-10-CM

## 2018-02-12 DIAGNOSIS — I10 ESSENTIAL HYPERTENSION: ICD-10-CM

## 2018-02-12 DIAGNOSIS — K21.9 GASTROESOPHAGEAL REFLUX DISEASE WITHOUT ESOPHAGITIS: ICD-10-CM

## 2018-02-12 DIAGNOSIS — E78.01 FAMILIAL HYPERCHOLESTEROLEMIA: Primary | ICD-10-CM

## 2018-02-12 DIAGNOSIS — E03.8 TSH (THYROID-STIMULATING HORMONE DEFICIENCY): ICD-10-CM

## 2018-02-12 DIAGNOSIS — R06.83 SNORING: ICD-10-CM

## 2018-02-12 DIAGNOSIS — R39.9 LOWER URINARY TRACT SYMPTOMS (LUTS): ICD-10-CM

## 2018-02-12 PROCEDURE — 90670 PCV13 VACCINE IM: CPT | Performed by: FAMILY MEDICINE

## 2018-02-12 PROCEDURE — G0009 ADMIN PNEUMOCOCCAL VACCINE: HCPCS | Performed by: FAMILY MEDICINE

## 2018-02-12 PROCEDURE — 99214 OFFICE O/P EST MOD 30 MIN: CPT | Performed by: FAMILY MEDICINE

## 2018-02-12 RX ORDER — LANSOPRAZOLE 30 MG/1
30 CAPSULE, DELAYED RELEASE ORAL DAILY
COMMUNITY
End: 2018-05-16

## 2018-02-12 NOTE — PROGRESS NOTES
Subjective   Dick Galindo is a 72 y.o. male.     Establish Care (trans from Dr Siddiqi, Pt is not fasting, talk about low iron, blood work he had done and just had colonoscopy done); Hypertension; Hyperlipidemia; and Earache (bilateral)    History of Present Illness    Hypertension follow up. Doing well with current medication which he is taking as directed. No known high or low blood pressure episodes. No cardiovascular or neurological symptoms. Today's BP: 134/68.      Hyperlipidemia follow up. He is taking statin medication without complaint. No myopathy symptoms.     Last lipid panel:   Lab Results   Component Value Date    HDL 59 09/14/2017     Lab Results   Component Value Date    LDL 71 09/14/2017     Lab Results   Component Value Date    TRIG 112 09/14/2017     Iron deficiency anemia.  He had a hemoglobin of 11 back in September.  It normalized in January.  His previous family doctor sent him for colonoscopy.  Colonoscopy showed no source of bleeding.  Just some polyps.  The iron studies were normal exception of a slightly low ferritin but the iron level and saturation was normal.  He was placed on iron tablets daily after the colonoscopy.  However prior to the colonoscopy is hemoglobin was normal.  He does not recall any rectal bleeding episodes other than occasional hemorrhoids.    During the colonoscopy the anesthesiologist noted possible sleep apnea issues.  Patient does snore.    Depression.  In remission.  Long-standing.  He continues on maintenance Zoloft 50 mg a day.    Lower urinary tract symptoms.  Mostly from a large prostate reportedly.  States he has trouble initiating stream.  He also takes a diuretic in addition to the tamsulosin.    Elevated TSH are normal T4 a few months ago.    GERD.  Long-standing Prevacid use.  Recent upper endoscopy overall normal.  No bleeding found.  There was some mild erythema of the gastric mucosa.    The following portions of the patient's history were reviewed  "and updated as appropriate: allergies, current medications, past family history, past medical history, past social history, past surgical history and problem list.      Review of Systems   Constitutional: Negative.    Respiratory: Negative.    Cardiovascular: Negative.    Genitourinary: Negative for difficulty urinating.   Musculoskeletal: Negative.    Neurological: Negative.    Psychiatric/Behavioral: Negative.        Objective   Blood pressure 134/68, pulse 77, temperature 98.2 °F (36.8 °C), temperature source Oral, height 167.6 cm (65.98\"), weight 112 kg (245 lb 14.4 oz), SpO2 94 %.  Physical Exam   Constitutional: He appears well-developed and well-nourished. No distress.   Neck: No thyromegaly present.   Cardiovascular: Normal rate, regular rhythm, normal heart sounds and intact distal pulses.    Pulmonary/Chest: Effort normal and breath sounds normal.   Musculoskeletal: He exhibits no edema.   Skin: Skin is warm and dry.   Psychiatric: He has a normal mood and affect. His behavior is normal. Judgment and thought content normal.   Nursing note and vitals reviewed.      Assessment/Plan   Dick was seen today for establish care, hypertension, hyperlipidemia and earache.    Diagnoses and all orders for this visit:    Familial hypercholesterolemia    Essential hypertension    Lower urinary tract symptoms (LUTS)    Recurrent major depressive disorder, in full remission    Gastroesophageal reflux disease without esophagitis    Snoring  -     Ambulatory Referral to Sleep Medicine    TSH (thyroid-stimulating hormone deficiency)    Iron deficiency anemia due to chronic blood loss    Other orders  -     sertraline (ZOLOFT) 50 MG tablet; Take 1 tablet by mouth Daily.        Hyperlipidemia.  Continue Crestor.    Hypertension.  He continues the diuretic and the lisinopril.    Lower uterine tract symptoms with a large prostate.  PSA normal last year.  Continue the diuretic with the tamsulosin for now.  To consider pulling " back on the diuretic with worsening urinary symptoms.  Although 3 great job with his blood pressure.    Depression.  Remission.  Continue sertraline.  This can be a cause of GI bleed.  If he has recurrent anemic issues, to consider stopping the sertraline.    Elevated TSH.  Asymptomatic.  Recheck.    Iron deficiency, with resolved anemia.  He can stop the iron supplementation.  I want to recheck his iron studies prior to next visit in 3 months.    Snoring.  Probable sleep apnea.  Referral to sleep medicine.    GERD.  Recommend cutting down the Prevacid to every other day or every third day.

## 2018-02-12 NOTE — PROGRESS NOTES
Tell him that pathology from the EGD looked okay.  The colon polyps that were removed were not cancerous and only 1 was precancerous.  The red fold that was biopsied was also not cancerous and not precancerous, which is good.  I would recommend a repeat colonoscopy in 2 years.

## 2018-02-14 ENCOUNTER — TELEPHONE (OUTPATIENT)
Dept: GASTROENTEROLOGY | Facility: CLINIC | Age: 73
End: 2018-02-14

## 2018-02-14 NOTE — TELEPHONE ENCOUNTER
----- Message from Usman Roper MD sent at 2/12/2018 12:25 PM EST -----  Tell him that pathology from the EGD looked okay.  The colon polyps that were removed were not cancerous and only 1 was precancerous.  The red fold that was biopsied was also not cancerous and not precancerous, which is good.  I would recommend a repeat colonoscopy in 2 years.

## 2018-02-14 NOTE — TELEPHONE ENCOUNTER
Called pt and spoke with pt's wife Leidy who is on the hipaa form, and advised per Dr Roper that the path from the egd looked ok.  The colon polyps that were removed were not cancerous and only 1 was precancerous.  The red fold that was bx'd was also not cancerous and not precancerous.  He recommends a repeat c/s in 2 yrs.  Pt verb understanding.     C/s placed in recall for 02/06/2020.

## 2018-02-17 ENCOUNTER — RESULTS ENCOUNTER (OUTPATIENT)
Dept: FAMILY MEDICINE CLINIC | Facility: CLINIC | Age: 73
End: 2018-02-17

## 2018-02-17 DIAGNOSIS — D50.0 IRON DEFICIENCY ANEMIA DUE TO CHRONIC BLOOD LOSS: ICD-10-CM

## 2018-02-17 DIAGNOSIS — E78.01 FAMILIAL HYPERCHOLESTEROLEMIA: ICD-10-CM

## 2018-04-12 ENCOUNTER — OFFICE VISIT (OUTPATIENT)
Dept: SLEEP MEDICINE | Facility: HOSPITAL | Age: 73
End: 2018-04-12
Attending: INTERNAL MEDICINE

## 2018-04-12 VITALS
WEIGHT: 243 LBS | HEIGHT: 66 IN | BODY MASS INDEX: 39.05 KG/M2 | DIASTOLIC BLOOD PRESSURE: 89 MMHG | SYSTOLIC BLOOD PRESSURE: 157 MMHG | HEART RATE: 81 BPM

## 2018-04-12 DIAGNOSIS — R06.83 SNORING: ICD-10-CM

## 2018-04-12 DIAGNOSIS — G47.30 HYPERSOMNIA WITH SLEEP APNEA: Primary | ICD-10-CM

## 2018-04-12 DIAGNOSIS — G47.10 HYPERSOMNIA WITH SLEEP APNEA: Primary | ICD-10-CM

## 2018-04-12 PROCEDURE — 99244 OFF/OP CNSLTJ NEW/EST MOD 40: CPT | Performed by: INTERNAL MEDICINE

## 2018-04-12 PROCEDURE — G0463 HOSPITAL OUTPT CLINIC VISIT: HCPCS

## 2018-04-12 NOTE — PROGRESS NOTES
[facility] New Patient/Consultation       Reason for Consultation: ROMINA    Patient Care Team:  Mynor Russ MD as PCP - General (Family Medicine)  Kendrick Diaz MD as Consulting Physician (Sleep Medicine)    Chief complaint:  ROMINA    History of present illness:  Thank you for asking me to see your patient.  The patient is a 72 y.o. male who has suspected sleep apnea.  In February, following endoscopy, he was told that he probably has ROMINA.  The patient's wife has noted witnessed apnea as well.  He goes to bed between 9:30 and 10:30 PM and awakens at 5:15 AM.  He feels fine upon arising and he might take 1 nap daily.  He does snore loudly and witnessed apnea has been noted.  He will use the restroom once or twice during the nighttime.  [place] Sleepiness Scale is abnormal at 11.    Review of Systems:  Recorded on the Sleep Questionnaire.  Unremarkable except for frequent urination, nasal congestion and postnasal drip, painful joints, and anxiety.    History:  Past Medical History:   Diagnosis Date   • Allergic    • Anxiety    • Colon polyps    • High cholesterol    • Hypertension    ,   Past Surgical History:   Procedure Laterality Date   • COLONOSCOPY  approx 2012    • COLONOSCOPY N/A 2/6/2018    Procedure: COLONOSCOPY INTO CEECUM AND TERMINAL MILEUM WITH COLD BIOPSY POLYPECTOMIES & COLD BIOIPSIES;  Surgeon: Usman Roper MD;  Location: [facility] ENDOSCOPY;  Service:    • ENDOSCOPY N/A 2/6/2018    Procedure: ESOPHAGOGASTRODUODENOSCOPY WITH COLD BIOPSIES;  Surgeon: Usman Roper MD;  Location: [facility] ENDOSCOPY;  Service:    • TONSILLECTOMY     ,   Family History   Problem Relation Age of Onset   • Stroke Father    • Heart attack Father     and   Social History   Substance Use Topics   • Smoking status: Former Smoker   • Smokeless tobacco: Never Used      Comment: He smoked between the ages of 18 and 32.   • Alcohol use Yes      Comment: He will have 8-10 drinks per week.       Social History:  He works  "in sales.  He will have 2 or 3 caffeinated beverages a day.    Allergies:  Amoxicillin and Levaquin [levofloxacin]     Medication Review: I reviewed his list.    Vital Signs:    Vitals:    04/12/18 1010   BP: 157/89   Pulse: 81   Weight: 110 kg (243 lb)   Height: 167.6 cm (66\")      Body mass index is 39.22 kg/m².    Physical Exam:  Recorded on the [facility] Physical Exam Form and is unremarkable except for:  A large tongue and normal uvula and narrow posterior pharyngeal opening and class II-III MP airway     Results Review:  Sleep log       Impression:   Hypersomnolence with loud snoring and witnessed apnea consistent with at least a moderate-high pretest probability of having ROMINA.    Plan:  Good sleep hygiene measures should be maintained.  Weight loss would be beneficial in this patient who is obese.    Pathophysiology of ROMINA described to the patient.  Cardiovascular complications of untreated ROMINA also reviewed.      After reviewing all with the patient, I would recommend proceeding with a home sleep study.  This will be arranged.     Thank you for requesting me to assist in this patient's care.    Kendrick Diaz MD  04/14/18  1:56 PM          "

## 2018-04-14 PROBLEM — G47.10 HYPERSOMNIA WITH SLEEP APNEA: Status: ACTIVE | Noted: 2018-04-14

## 2018-04-14 PROBLEM — G47.30 HYPERSOMNIA WITH SLEEP APNEA: Status: ACTIVE | Noted: 2018-04-14

## 2018-04-27 ENCOUNTER — HOSPITAL ENCOUNTER (OUTPATIENT)
Dept: SLEEP MEDICINE | Facility: HOSPITAL | Age: 73
Discharge: HOME | End: 2018-04-27
Attending: INTERNAL MEDICINE | Admitting: INTERNAL MEDICINE

## 2018-04-27 DIAGNOSIS — G47.30 HYPERSOMNIA WITH SLEEP APNEA: ICD-10-CM

## 2018-04-27 DIAGNOSIS — G47.10 HYPERSOMNIA WITH SLEEP APNEA: ICD-10-CM

## 2018-04-27 PROCEDURE — 95806 SLEEP STUDY UNATT&RESP EFFT: CPT | Performed by: INTERNAL MEDICINE

## 2018-04-27 PROCEDURE — 95806 SLEEP STUDY UNATT&RESP EFFT: CPT

## 2018-05-09 ENCOUNTER — TELEPHONE (OUTPATIENT)
Dept: SLEEP MEDICINE | Facility: HOSPITAL | Age: 73
End: 2018-05-09

## 2018-05-09 LAB
ALBUMIN SERPL-MCNC: 4.4 G/DL (ref 3.5–5.2)
ALBUMIN/GLOB SERPL: 1.8 G/DL
ALP SERPL-CCNC: 51 U/L (ref 39–117)
ALT SERPL-CCNC: 29 U/L (ref 1–41)
AST SERPL-CCNC: 26 U/L (ref 1–40)
BASOPHILS # BLD AUTO: 0.05 10*3/MM3 (ref 0–0.2)
BASOPHILS NFR BLD AUTO: 0.6 % (ref 0–1.5)
BILIRUB SERPL-MCNC: 0.4 MG/DL (ref 0.1–1.2)
BUN SERPL-MCNC: 23 MG/DL (ref 8–23)
BUN/CREAT SERPL: 22.8 (ref 7–25)
CALCIUM SERPL-MCNC: 9.4 MG/DL (ref 8.6–10.5)
CHLORIDE SERPL-SCNC: 99 MMOL/L (ref 98–107)
CHOLEST SERPL-MCNC: 150 MG/DL (ref 0–200)
CO2 SERPL-SCNC: 27.1 MMOL/L (ref 22–29)
CREAT SERPL-MCNC: 1.01 MG/DL (ref 0.76–1.27)
EOSINOPHIL # BLD AUTO: 0.31 10*3/MM3 (ref 0–0.7)
EOSINOPHIL NFR BLD AUTO: 3.7 % (ref 0.3–6.2)
ERYTHROCYTE [DISTWIDTH] IN BLOOD BY AUTOMATED COUNT: 13.7 % (ref 11.5–14.5)
GFR SERPLBLD CREATININE-BSD FMLA CKD-EPI: 73 ML/MIN/1.73
GFR SERPLBLD CREATININE-BSD FMLA CKD-EPI: 89 ML/MIN/1.73
GLOBULIN SER CALC-MCNC: 2.5 GM/DL
GLUCOSE SERPL-MCNC: 102 MG/DL (ref 65–99)
HCT VFR BLD AUTO: 48.9 % (ref 40.4–52.2)
HDLC SERPL-MCNC: 49 MG/DL (ref 40–60)
HGB BLD-MCNC: 15.7 G/DL (ref 13.7–17.6)
IMM GRANULOCYTES # BLD: 0.02 10*3/MM3 (ref 0–0.03)
IMM GRANULOCYTES NFR BLD: 0.2 % (ref 0–0.5)
LDLC SERPL CALC-MCNC: 79 MG/DL (ref 0–100)
LYMPHOCYTES # BLD AUTO: 2.09 10*3/MM3 (ref 0.9–4.8)
LYMPHOCYTES NFR BLD AUTO: 25.1 % (ref 19.6–45.3)
MCH RBC QN AUTO: 30.7 PG (ref 27–32.7)
MCHC RBC AUTO-ENTMCNC: 32.1 G/DL (ref 32.6–36.4)
MCV RBC AUTO: 95.5 FL (ref 79.8–96.2)
MONOCYTES # BLD AUTO: 0.67 10*3/MM3 (ref 0.2–1.2)
MONOCYTES NFR BLD AUTO: 8 % (ref 5–12)
NEUTROPHILS # BLD AUTO: 5.22 10*3/MM3 (ref 1.9–8.1)
NEUTROPHILS NFR BLD AUTO: 62.6 % (ref 42.7–76)
PLATELET # BLD AUTO: 260 10*3/MM3 (ref 140–500)
POTASSIUM SERPL-SCNC: 4.2 MMOL/L (ref 3.5–5.2)
PROT SERPL-MCNC: 6.9 G/DL (ref 6–8.5)
RBC # BLD AUTO: 5.12 10*6/MM3 (ref 4.6–6)
SODIUM SERPL-SCNC: 140 MMOL/L (ref 136–145)
T4 FREE SERPL-MCNC: 1.05 NG/DL (ref 0.93–1.7)
TRIGL SERPL-MCNC: 108 MG/DL (ref 0–150)
TSH SERPL DL<=0.005 MIU/L-ACNC: 5.77 MIU/ML (ref 0.27–4.2)
VLDLC SERPL CALC-MCNC: 21.6 MG/DL (ref 5–40)
WBC # BLD AUTO: 8.34 10*3/MM3 (ref 4.5–10.7)

## 2018-05-09 NOTE — TELEPHONE ENCOUNTER
Spoke with patients wife about sleep study results, sending order to missy, will return call to schedule follow up appointment

## 2018-05-12 ENCOUNTER — RESULTS ENCOUNTER (OUTPATIENT)
Dept: FAMILY MEDICINE CLINIC | Facility: CLINIC | Age: 73
End: 2018-05-12

## 2018-05-12 DIAGNOSIS — E03.8 TSH (THYROID-STIMULATING HORMONE DEFICIENCY): ICD-10-CM

## 2018-05-16 ENCOUNTER — OFFICE VISIT (OUTPATIENT)
Dept: FAMILY MEDICINE CLINIC | Facility: CLINIC | Age: 73
End: 2018-05-16

## 2018-05-16 VITALS
TEMPERATURE: 98 F | WEIGHT: 245.9 LBS | HEART RATE: 88 BPM | BODY MASS INDEX: 39.52 KG/M2 | DIASTOLIC BLOOD PRESSURE: 68 MMHG | SYSTOLIC BLOOD PRESSURE: 118 MMHG | OXYGEN SATURATION: 94 % | HEIGHT: 66 IN

## 2018-05-16 DIAGNOSIS — E03.8 SUBCLINICAL HYPOTHYROIDISM: Primary | ICD-10-CM

## 2018-05-16 DIAGNOSIS — J20.9 BRONCHOSPASM WITH BRONCHITIS, ACUTE: ICD-10-CM

## 2018-05-16 DIAGNOSIS — I10 ESSENTIAL HYPERTENSION: ICD-10-CM

## 2018-05-16 DIAGNOSIS — E78.01 FAMILIAL HYPERCHOLESTEROLEMIA: ICD-10-CM

## 2018-05-16 PROCEDURE — 99214 OFFICE O/P EST MOD 30 MIN: CPT | Performed by: FAMILY MEDICINE

## 2018-05-16 RX ORDER — CETIRIZINE HYDROCHLORIDE 10 MG/1
10 TABLET ORAL DAILY
COMMUNITY
End: 2020-06-29

## 2018-05-16 RX ORDER — GUAIFENESIN, PSEUDOEPHEDRINE HYDROCHLORIDE 600; 60 MG/1; MG/1
1 TABLET, EXTENDED RELEASE ORAL EVERY 12 HOURS
COMMUNITY
End: 2018-11-14

## 2018-05-16 RX ORDER — TRIAMTERENE AND HYDROCHLOROTHIAZIDE 37.5; 25 MG/1; MG/1
1 TABLET ORAL DAILY
Qty: 90 TABLET | Refills: 2 | Status: SHIPPED | OUTPATIENT
Start: 2018-05-16 | End: 2018-11-14

## 2018-05-16 RX ORDER — ALBUTEROL SULFATE 90 UG/1
AEROSOL, METERED RESPIRATORY (INHALATION)
Qty: 18 G | Refills: 3 | Status: SHIPPED | OUTPATIENT
Start: 2018-05-16 | End: 2020-05-27 | Stop reason: SDUPTHER

## 2018-05-16 RX ORDER — ROSUVASTATIN CALCIUM 5 MG/1
5 TABLET, COATED ORAL DAILY
Qty: 90 TABLET | Refills: 2 | Status: SHIPPED | OUTPATIENT
Start: 2018-05-16 | End: 2018-11-14 | Stop reason: SDUPTHER

## 2018-05-16 RX ORDER — LEVOTHYROXINE SODIUM 0.03 MG/1
25 TABLET ORAL DAILY
Qty: 90 TABLET | Refills: 1 | Status: SHIPPED | OUTPATIENT
Start: 2018-05-16 | End: 2018-11-14 | Stop reason: SDUPTHER

## 2018-05-16 RX ORDER — TAMSULOSIN HYDROCHLORIDE 0.4 MG/1
1 CAPSULE ORAL NIGHTLY
Qty: 90 CAPSULE | Refills: 1 | Status: SHIPPED | OUTPATIENT
Start: 2018-05-16 | End: 2018-07-27 | Stop reason: SDUPTHER

## 2018-05-16 NOTE — PROGRESS NOTES
"Subjective   Dick Galindo is a 72 y.o. male.     Hypertension (follow up labs)    History of Present Illness     Elevated TSH.  Repeated.  TSH is higher.  T4 is borderline low.  He has no hypothyroid symptoms with exception of some dry skin and maybe some hair issues.ff    Hypertension follow up. Doing well with current medication which he is taking as directed. No known high or low blood pressure episodes. No cardiovascular or neurological symptoms. Today's BP: 118/68.      Hyperlipidemia follow up. He is taking statin medication without complaint. No myopathy symptoms.     Last lipid panel:   Lab Results   Component Value Date    HDL 49 05/09/2018     Lab Results   Component Value Date    LDL 79 05/09/2018     Lab Results   Component Value Date    TRIG 108 05/09/2018       The following portions of the patient's history were reviewed and updated as appropriate: allergies, current medications, past family history, past medical history, past social history, past surgical history and problem list.      Review of Systems   Constitutional: Negative.    Respiratory: Negative.    Cardiovascular: Negative.    Musculoskeletal: Negative.        Objective   Blood pressure 118/68, pulse 88, temperature 98 °F (36.7 °C), temperature source Oral, height 167.6 cm (65.98\"), weight 112 kg (245 lb 14.4 oz), SpO2 94 %.  Physical Exam   Constitutional: He appears well-developed and well-nourished. No distress.   Eyes: Conjunctivae are normal.   Neck: Normal range of motion. No thyromegaly present.   Cardiovascular: Normal rate, regular rhythm and normal heart sounds.    Pulmonary/Chest: Effort normal and breath sounds normal. No respiratory distress.   Musculoskeletal: He exhibits no edema.   Lymphadenopathy:     He has no cervical adenopathy.   Skin: Skin is warm and dry.   Psychiatric: He has a normal mood and affect. His behavior is normal. Judgment and thought content normal.   Nursing note and vitals " reviewed.      Assessment/Plan   Dick was seen today for hypertension.    Diagnoses and all orders for this visit:    Subclinical hypothyroidism    Essential hypertension    Bronchospasm with bronchitis, acute  -     VENTOLIN  (90 Base) MCG/ACT inhaler; 2 puffs Q4H for cough and wheeze    Familial hypercholesterolemia    Other orders  -     triamterene-hydrochlorothiazide (MAXZIDE-25) 37.5-25 MG per tablet; Take 1 tablet by mouth Daily.  -     sertraline (ZOLOFT) 50 MG tablet; Take 1 tablet by mouth Daily.  -     tamsulosin (FLOMAX) 0.4 MG capsule 24 hr capsule; Take 1 capsule by mouth Every Night.  -     rosuvastatin (CRESTOR) 5 MG tablet; Take 1 tablet by mouth Daily. Give generic when available  -     Cancel: Iron and TIBC  -     Cancel: Ferritin  -     Cancel: Vitamin B12  -     Cancel: CBC & Differential  -     levothyroxine (SYNTHROID, LEVOTHROID) 25 MCG tablet; Take 1 tablet by mouth Daily.    Subclinical hypothyroidism.  Starting levothyroxine 25 µg a day.  Check TSH and other lab work prior to next visit.  Follow-up in 6 months for Medicare wellness visit and follow-up on above medical problems.    Hypertension.  Stable.  Next    Hyperlipidemia.  Stable.  Refills given.    Intermittent bronchospasm related to allergies.  Refilled his Ventolin.    Iron deficiency anemia.  Resolved.  I reviewed his chart, his GI doctor recently check iron studies and their overall favorable.  Cancel lab work for today.marline

## 2018-07-25 ENCOUNTER — OFFICE VISIT (OUTPATIENT)
Dept: SLEEP MEDICINE | Facility: HOSPITAL | Age: 73
End: 2018-07-25
Attending: INTERNAL MEDICINE

## 2018-07-25 DIAGNOSIS — Z99.89 OSA ON CPAP: Primary | ICD-10-CM

## 2018-07-25 DIAGNOSIS — IMO0002 SLEEP-RELATED HYPOVENTILATION: ICD-10-CM

## 2018-07-25 DIAGNOSIS — G47.33 OSA ON CPAP: Primary | ICD-10-CM

## 2018-07-25 PROCEDURE — G0463 HOSPITAL OUTPT CLINIC VISIT: HCPCS

## 2018-07-25 PROCEDURE — 99213 OFFICE O/P EST LOW 20 MIN: CPT | Performed by: INTERNAL MEDICINE

## 2018-07-25 NOTE — PROGRESS NOTES
Follow Up [facility] Note     Chief Complaint:  ROMINA     Primary Care Physician: Mynor Russ MD    Interval History:   The patient was initially seen by me in April.  A home sleep study was performed April 27, 2018.  Severe ROMINA with AHI 66 events per hour noted.  Sleep-related hypoxemia also present.  Auto CPAP initiated.  The patient is here today for follow-up and states he is improved with the use of auto CPAP.  He goes to bed at 10 PM and awakens at 5:30 AM.  He does wake up once or twice and he will use the restroom.  [place] Sleepiness Scale has decreased from 11 down to 7.    Review of Systems:  Recorded on the Sleep Questionnaire.  Unremarkable except for nasal congestion    Social History:    Social History     Social History   • Marital status:      Social History Main Topics   • Smoking status: Former Smoker   • Smokeless tobacco: Never Used      Comment: He smoked between the ages of 18 and 32.   • Alcohol use Yes      Comment: He will have 8-10 drinks per week.   • Drug use: No     Other Topics Concern   • Not on file       Allergies:  Amoxicillin and Levaquin [levofloxacin]     Medication Review:  Reviewed.      Vital Signs:  Height 66 inches, weight has decreased from 243 down to 230 pounds and BMI obese at 37.    Physical Exam:    Constitutional:  Well developed white male and appears in no apparent distress.  Awake & oriented times 3.  Normal mood with normal recent and remote memory and normal judgement.  Eyes:  Conjunctivae normal.  Oropharynx:  moist mucous membranes without exudate and a large tongue and normal uvula and narrow posterior pharyngeal opening and class II-III MP airway      Results Review:  DME is [facility] and he uses a fullface mask.  Downloads between May 25 and July 24, 2018 compliances 100%.  Average usage is 7 hours and 6 minutes.  Average AHI is normal without leak.  Average AutoCPAP pressure is 13.6 and his auto CPAP is 8-20    Overnight oximetry  performed June 12 on auto CPAP on room air and obtained today demonstrates total valid sampling time of 6 hours and 25 minutes.  Lowest O2 saturation 78% and approximately 17 minutes of the total valid sampling time still revealed sleep-related hypoxemia.  His home sleep study demonstrated a low O2 saturation 67% for 94 minutes.       Impression:   Severe ROMINA with significant sleep-related hypoxemia that is adequately treated with auto titrating CPAP with significant improvement and sleep-related hypoxemia noted.  The patient demonstrates good compliance and usage.      Plan:  Good sleep hygiene measures should be maintained.  Weight loss would be beneficial in this patient who is obese by BMI.  The patient is benefiting from the treatment being provided.     The patient will continue auto CPAP.  After his next follow-up, I will obtain repeat overnight oximetry on auto CPAP on room air.    The patient will call for any problems and will follow up in 4 months.      Kendrick Diaz MD  Sleep Medicine  07/25/18  10:12 AM

## 2018-07-27 RX ORDER — LISINOPRIL 40 MG/1
40 TABLET ORAL DAILY
Qty: 90 TABLET | Refills: 2 | Status: SHIPPED | OUTPATIENT
Start: 2018-07-27 | End: 2018-11-14 | Stop reason: SDUPTHER

## 2018-07-27 RX ORDER — TAMSULOSIN HYDROCHLORIDE 0.4 MG/1
1 CAPSULE ORAL NIGHTLY
Qty: 90 CAPSULE | Refills: 1 | Status: SHIPPED | OUTPATIENT
Start: 2018-07-27 | End: 2018-11-14 | Stop reason: SDUPTHER

## 2018-07-28 PROBLEM — IMO0002 SLEEP-RELATED HYPOVENTILATION: Status: ACTIVE | Noted: 2018-07-28

## 2018-08-14 ENCOUNTER — RESULTS ENCOUNTER (OUTPATIENT)
Dept: FAMILY MEDICINE CLINIC | Facility: CLINIC | Age: 73
End: 2018-08-14

## 2018-08-14 DIAGNOSIS — E78.01 FAMILIAL HYPERCHOLESTEROLEMIA: ICD-10-CM

## 2018-08-14 DIAGNOSIS — E03.8 SUBCLINICAL HYPOTHYROIDISM: ICD-10-CM

## 2018-11-07 LAB
ALBUMIN SERPL-MCNC: 4.2 G/DL (ref 3.5–5.2)
ALBUMIN/GLOB SERPL: 1.4 G/DL
ALP SERPL-CCNC: 58 U/L (ref 39–117)
ALT SERPL-CCNC: 23 U/L (ref 1–41)
AST SERPL-CCNC: 22 U/L (ref 1–40)
BILIRUB SERPL-MCNC: 0.6 MG/DL (ref 0.1–1.2)
BUN SERPL-MCNC: 25 MG/DL (ref 8–23)
BUN/CREAT SERPL: 20.3 (ref 7–25)
CALCIUM SERPL-MCNC: 9.8 MG/DL (ref 8.6–10.5)
CHLORIDE SERPL-SCNC: 100 MMOL/L (ref 98–107)
CHOLEST SERPL-MCNC: 155 MG/DL (ref 0–200)
CO2 SERPL-SCNC: 29.5 MMOL/L (ref 22–29)
CREAT SERPL-MCNC: 1.23 MG/DL (ref 0.76–1.27)
GLOBULIN SER CALC-MCNC: 2.9 GM/DL
GLUCOSE SERPL-MCNC: 100 MG/DL (ref 65–99)
HDLC SERPL-MCNC: 54 MG/DL (ref 40–60)
LDLC SERPL CALC-MCNC: 82 MG/DL (ref 0–100)
POTASSIUM SERPL-SCNC: 4.5 MMOL/L (ref 3.5–5.2)
PROT SERPL-MCNC: 7.1 G/DL (ref 6–8.5)
SODIUM SERPL-SCNC: 143 MMOL/L (ref 136–145)
TRIGL SERPL-MCNC: 96 MG/DL (ref 0–150)
TSH SERPL DL<=0.005 MIU/L-ACNC: 3.19 MIU/ML (ref 0.27–4.2)
VLDLC SERPL CALC-MCNC: 19.2 MG/DL (ref 5–40)

## 2018-11-14 ENCOUNTER — OFFICE VISIT (OUTPATIENT)
Dept: FAMILY MEDICINE CLINIC | Facility: CLINIC | Age: 73
End: 2018-11-14

## 2018-11-14 VITALS
TEMPERATURE: 97 F | SYSTOLIC BLOOD PRESSURE: 147 MMHG | DIASTOLIC BLOOD PRESSURE: 76 MMHG | HEIGHT: 66 IN | WEIGHT: 238.6 LBS | HEART RATE: 86 BPM | OXYGEN SATURATION: 97 % | BODY MASS INDEX: 38.35 KG/M2

## 2018-11-14 DIAGNOSIS — E03.8 SUBCLINICAL HYPOTHYROIDISM: ICD-10-CM

## 2018-11-14 DIAGNOSIS — Z00.00 MEDICARE ANNUAL WELLNESS VISIT, SUBSEQUENT: Primary | ICD-10-CM

## 2018-11-14 DIAGNOSIS — R39.9 LOWER URINARY TRACT SYMPTOMS (LUTS): ICD-10-CM

## 2018-11-14 DIAGNOSIS — N30.90 CYSTITIS: ICD-10-CM

## 2018-11-14 DIAGNOSIS — I10 ESSENTIAL HYPERTENSION: ICD-10-CM

## 2018-11-14 DIAGNOSIS — E78.01 FAMILIAL HYPERCHOLESTEROLEMIA: ICD-10-CM

## 2018-11-14 DIAGNOSIS — F33.42 RECURRENT MAJOR DEPRESSIVE DISORDER, IN FULL REMISSION (HCC): ICD-10-CM

## 2018-11-14 LAB
BILIRUB BLD-MCNC: NEGATIVE MG/DL
CLARITY, POC: ABNORMAL
COLOR UR: YELLOW
GLUCOSE UR STRIP-MCNC: NEGATIVE MG/DL
KETONES UR QL: NEGATIVE
LEUKOCYTE EST, POC: ABNORMAL
NITRITE UR-MCNC: NEGATIVE MG/ML
PH UR: 5.5 [PH] (ref 5–8)
PROT UR STRIP-MCNC: NEGATIVE MG/DL
RBC # UR STRIP: NEGATIVE /UL
SP GR UR: 1.01 (ref 1–1.03)
UROBILINOGEN UR QL: NORMAL

## 2018-11-14 PROCEDURE — 99214 OFFICE O/P EST MOD 30 MIN: CPT | Performed by: FAMILY MEDICINE

## 2018-11-14 PROCEDURE — 81003 URINALYSIS AUTO W/O SCOPE: CPT | Performed by: FAMILY MEDICINE

## 2018-11-14 PROCEDURE — G0402 INITIAL PREVENTIVE EXAM: HCPCS | Performed by: FAMILY MEDICINE

## 2018-11-14 RX ORDER — LEVOTHYROXINE SODIUM 0.03 MG/1
25 TABLET ORAL DAILY
Qty: 90 TABLET | Refills: 1 | Status: SHIPPED | OUTPATIENT
Start: 2018-11-14 | End: 2019-05-14 | Stop reason: SDUPTHER

## 2018-11-14 RX ORDER — TRIAMTERENE AND HYDROCHLOROTHIAZIDE 37.5; 25 MG/1; MG/1
1 TABLET ORAL DAILY
Qty: 90 TABLET | Refills: 1 | Status: CANCELLED | OUTPATIENT
Start: 2018-11-14

## 2018-11-14 RX ORDER — AMLODIPINE BESYLATE 5 MG/1
5 TABLET ORAL DAILY
Qty: 90 TABLET | Refills: 1 | Status: SHIPPED | OUTPATIENT
Start: 2018-11-14 | End: 2019-05-14 | Stop reason: SDUPTHER

## 2018-11-14 RX ORDER — ROSUVASTATIN CALCIUM 5 MG/1
5 TABLET, COATED ORAL DAILY
Qty: 90 TABLET | Refills: 1 | Status: SHIPPED | OUTPATIENT
Start: 2018-11-14 | End: 2018-11-19

## 2018-11-14 RX ORDER — LISINOPRIL 40 MG/1
40 TABLET ORAL DAILY
Qty: 90 TABLET | Refills: 1 | Status: SHIPPED | OUTPATIENT
Start: 2018-11-14 | End: 2018-11-19

## 2018-11-14 RX ORDER — TAMSULOSIN HYDROCHLORIDE 0.4 MG/1
1 CAPSULE ORAL NIGHTLY
Qty: 90 CAPSULE | Refills: 1 | Status: SHIPPED | OUTPATIENT
Start: 2018-11-14 | End: 2019-05-14 | Stop reason: SDUPTHER

## 2018-11-14 NOTE — PROGRESS NOTES
QUICK REFERENCE INFORMATION:  The ABCs of the Annual Wellness Visit    Subsequent Medicare Wellness Visit    HEALTH RISK ASSESSMENT    1945    Recent Hospitalizations:  No hospitalization(s) within the last year..        Current Medical Providers:  Patient Care Team:  Mynor Russ MD as PCP - General (Family Medicine)        Smoking Status:  Social History     Tobacco Use   Smoking Status Former Smoker   Smokeless Tobacco Never Used   Tobacco Comment    He smoked between the ages of 18 and 32.       Alcohol Consumption:  Social History     Substance and Sexual Activity   Alcohol Use Yes    Comment: He will have 8-10 drinks per week.       Depression Screen:   PHQ-2/PHQ-9 Depression Screening 11/14/2018   Little interest or pleasure in doing things 0   Feeling down, depressed, or hopeless 0   Total Score 0       Health Habits and Functional and Cognitive Screening:  Functional & Cognitive Status 11/14/2018   Do you have difficulty preparing food and eating? No   Do you have difficulty bathing yourself, getting dressed or grooming yourself? No   Do you have difficulty using the toilet? No   Do you have difficulty moving around from place to place? No   Do you have trouble with steps or getting out of a bed or a chair? No   In the past year have you fallen or experienced a near fall? No   Current Diet Well Balanced Diet   Dental Exam Up to date   Eye Exam Up to date   Exercise (times per week) 3 times per week   Current Exercise Activities Include Cardiovasular Workout on Exercise Equipment   Do you need help using the phone?  No   Are you deaf or do you have serious difficulty hearing?  No   Do you need help with transportation? No   Do you need help shopping? No   Do you need help preparing meals?  No   Do you need help with housework?  No   Do you need help with laundry? No   Do you need help taking your medications? No   Do you need help managing money? No   Do you ever drive or ride in a car without  wearing a seat belt? No   Have you felt unusual stress, anger or loneliness in the last month? No   Who do you live with? Spouse   If you need help, do you have trouble finding someone available to you? No   Have you been bothered in the last four weeks by sexual problems? No   Do you have difficulty concentrating, remembering or making decisions? No           Does the patient have evidence of cognitive impairment? No    Aspirin use counseling: Taking ASA appropriately as indicated      Recent Lab Results:  CMP:  Lab Results   Component Value Date     (H) 11/07/2018    BUN 25 (H) 11/07/2018    CREATININE 1.23 11/07/2018    EGFRIFNONA 58 (L) 11/07/2018    EGFRIFAFRI 70 11/07/2018    BCR 20.3 11/07/2018     11/07/2018    K 4.5 11/07/2018    CO2 29.5 (H) 11/07/2018    CALCIUM 9.8 11/07/2018    PROTENTOTREF 7.1 11/07/2018    ALBUMIN 4.20 11/07/2018    LABGLOBREF 2.9 11/07/2018    LABIL2 1.4 11/07/2018    BILITOT 0.6 11/07/2018    ALKPHOS 58 11/07/2018    AST 22 11/07/2018    ALT 23 11/07/2018     Lipid Panel:  Lab Results   Component Value Date    TRIG 96 11/07/2018    HDL 54 11/07/2018    VLDL 19.2 11/07/2018    LDLHDL 1.20 09/14/2017     HbA1c:  Lab Results   Component Value Date    HGBA1C 5.41 09/14/2017       Visual Acuity:  No exam data present    Age-appropriate Screening Schedule:  Refer to the list below for future screening recommendations based on patient's age, sex and/or medical conditions. Orders for these recommended tests are listed in the plan section. The patient has been provided with a written plan.    Health Maintenance   Topic Date Due   • TDAP/TD VACCINES (1 - Tdap) 06/22/1966   • ZOSTER VACCINE (1 of 2) 06/22/1997   • INFLUENZA VACCINE  08/01/2018   • PNEUMOCOCCAL VACCINES (65+ LOW/MEDIUM RISK) (2 of 2 - PPSV23) 02/12/2019   • LIPID PANEL  11/07/2019   • COLONOSCOPY  02/06/2020        Immunization History   Administered Date(s) Administered   • Influenza TIV (IM) 10/01/2016   •  Pneumococcal Conjugate 13-Valent (PCV13) 02/12/2018         Subjective   History of Present Illness    Dick Galindo is a 73 y.o. male who presents for an Subsequent Wellness Visit.    The following portions of the patient's history were reviewed and updated as appropriate: allergies, current medications, past family history, past medical history, past social history, past surgical history and problem list.    Outpatient Medications Prior to Visit   Medication Sig Dispense Refill   • acetaminophen (TYLENOL) 325 MG tablet Take 650 mg by mouth.     • aspirin 81 MG tablet Take 81 mg by mouth daily.     • cetirizine (zyrTEC) 10 MG tablet Take 10 mg by mouth Daily.     • VENTOLIN  (90 Base) MCG/ACT inhaler 2 puffs Q4H for cough and wheeze 18 g 3   • levothyroxine (SYNTHROID, LEVOTHROID) 25 MCG tablet Take 1 tablet by mouth Daily. 90 tablet 1   • lisinopril (PRINIVIL,ZESTRIL) 40 MG tablet Take 1 tablet by mouth Daily. 90 tablet 2   • pseudoephedrine-guaifenesin (MUCINEX D)  MG per 12 hr tablet Take 1 tablet by mouth Every 12 (Twelve) Hours.     • rosuvastatin (CRESTOR) 5 MG tablet Take 1 tablet by mouth Daily. Give generic when available 90 tablet 2   • sertraline (ZOLOFT) 50 MG tablet Take 1 tablet by mouth Daily. 90 tablet 1   • tamsulosin (FLOMAX) 0.4 MG capsule 24 hr capsule Take 1 capsule by mouth Every Night. 90 capsule 1   • triamterene-hydrochlorothiazide (MAXZIDE-25) 37.5-25 MG per tablet Take 1 tablet by mouth Daily. 90 tablet 2     No facility-administered medications prior to visit.        Patient Active Problem List   Diagnosis   • Spinal stenosis of lumbar region   • Essential hypertension   • Lower urinary tract symptoms (LUTS)   • Familial hypercholesterolemia   • Recurrent major depressive disorder, in full remission (CMS/HCC)   • Hyperplastic colon polyp   • Iron deficiency anemia due to chronic blood loss   • Gastroesophageal reflux disease without esophagitis   • Subclinical  "hypothyroidism   • Severe ROMINA on auto CPAP   • Sleep-related hypoxia       Advance Care Planning:  has an advance directive - a copy HAS NOT been provided. Have asked the patient to send this to us to add to record. He is not sure if he has one. He will check records..     Identification of Risk Factors:  Risk factors include: cardiovascular risk.    Review of Systems    Compared to one year ago, the patient feels his physical health is the same.  Compared to one year ago, the patient feels his mental health is the same.    Objective     Physical Exam    Vitals:    11/14/18 1026   BP: 147/76   Pulse: 86   Temp: 97 °F (36.1 °C)   TempSrc: Oral   SpO2: 97%   Weight: 108 kg (238 lb 9.6 oz)   Height: 167.6 cm (65.98\")       Patient's Body mass index is 38.53 kg/m². BMI is above normal parameters. Recommendations include: exercise counseling.      Assessment/Plan   Patient Self-Management and Personalized Health Advice  The patient has been provided with information about: exercise, prevention of cardiac or vascular disease and designing advance directives and preventive services including:   · Advance directive, New shingles vaccine, TdaP, hepatitis A recommended a retail pharmacy.    Visit Diagnoses:    ICD-10-CM ICD-9-CM   1. Medicare annual wellness visit, subsequent Z00.00 V70.0   2. Cystitis N30.90 595.9   3. Familial hypercholesterolemia E78.01 272.0   4. Essential hypertension I10 401.9   5. Subclinical hypothyroidism E03.9 244.8   6. Lower urinary tract symptoms (LUTS) R39.9 788.99   7. Recurrent major depressive disorder, in full remission (CMS/[facility]) F33.42 296.36       Orders Placed This Encounter   Procedures   • Urine Culture - Urine, Urine, Clean Catch   • POC Urinalysis Dipstick, Automated       Outpatient Encounter Medications as of 11/14/2018   Medication Sig Dispense Refill   • acetaminophen (TYLENOL) 325 MG tablet Take 650 mg by mouth.     • aspirin 81 MG tablet Take 81 mg by mouth daily.     • " cetirizine (zyrTEC) 10 MG tablet Take 10 mg by mouth Daily.     • levothyroxine (SYNTHROID, LEVOTHROID) 25 MCG tablet Take 1 tablet by mouth Daily. 90 tablet 1   • lisinopril (PRINIVIL,ZESTRIL) 40 MG tablet Take 1 tablet by mouth Daily. 90 tablet 1   • rosuvastatin (CRESTOR) 5 MG tablet Take 1 tablet by mouth Daily. Give generic when available 90 tablet 1   • sertraline (ZOLOFT) 50 MG tablet Take 1 tablet by mouth Daily. 90 tablet 1   • tamsulosin (FLOMAX) 0.4 MG capsule 24 hr capsule Take 1 capsule by mouth Every Night. 90 capsule 1   • VENTOLIN  (90 Base) MCG/ACT inhaler 2 puffs Q4H for cough and wheeze 18 g 3   • [DISCONTINUED] levothyroxine (SYNTHROID, LEVOTHROID) 25 MCG tablet Take 1 tablet by mouth Daily. 90 tablet 1   • [DISCONTINUED] lisinopril (PRINIVIL,ZESTRIL) 40 MG tablet Take 1 tablet by mouth Daily. 90 tablet 2   • [DISCONTINUED] pseudoephedrine-guaifenesin (MUCINEX D)  MG per 12 hr tablet Take 1 tablet by mouth Every 12 (Twelve) Hours.     • [DISCONTINUED] rosuvastatin (CRESTOR) 5 MG tablet Take 1 tablet by mouth Daily. Give generic when available 90 tablet 2   • [DISCONTINUED] sertraline (ZOLOFT) 50 MG tablet Take 1 tablet by mouth Daily. 90 tablet 1   • [DISCONTINUED] tamsulosin (FLOMAX) 0.4 MG capsule 24 hr capsule Take 1 capsule by mouth Every Night. 90 capsule 1   • [DISCONTINUED] triamterene-hydrochlorothiazide (MAXZIDE-25) 37.5-25 MG per tablet Take 1 tablet by mouth Daily. 90 tablet 2   • amLODIPine (NORVASC) 5 MG tablet Take 1 tablet by mouth Daily. 90 tablet 1     No facility-administered encounter medications on file as of 11/14/2018.        Reviewed use of high risk medication in the elderly: yes  Reviewed for potential of harmful drug interactions in the elderly: yes    Follow Up:  Return in about 6 months (around 5/14/2019) for Recheck.     An After Visit Summary and PPPS with all of these plans were given to the patient.

## 2018-11-14 NOTE — PROGRESS NOTES
"Subjective   Dick Galindo is a 73 y.o. male.     Hypothyroidism (follow up labs) and Cystitis (x 1 wk)    History of Present Illness    Hypertension follow up. Doing well with current medication which he is taking as directed. No known high or low blood pressure episodes. No cardiovascular or neurological symptoms. Today's BP: 147/76.  However he rarely checks his blood pressure outside of here.  Sometimes at the [facility].    Hyperlipidemia follow up. He is taking statin medication without complaint. No myopathy symptoms.     Last lipid panel:   Lab Results   Component Value Date    HDL 54 11/07/2018     Lab Results   Component Value Date    LDL 82 11/07/2018     Lab Results   Component Value Date    TRIG 96 11/07/2018     Hypothyroidism followup.  Taking levothyroxine as indicated.  No symptoms of high or low thyroid.  Last TSH as follows:    Lab Results   Component Value Date    TSH 3.19 11/07/2018      Major depression.  In complete remission.  He continues Zoloft maintenance therapy.  Next    Lower urinary tract symptoms.  Is complained of some strong smelling urine recently.  He continues his tamsulosin.  No burning with urination.  No pain with urination.  No hematuria.  He does describe frequent urination related to his water pill, triamterene hydrochlorothiazide.    The following portions of the patient's history were reviewed and updated as appropriate: allergies, current medications, past family history, past medical history, past social history, past surgical history and problem list.      Review of Systems   Constitutional: Negative.    Respiratory: Negative.    Cardiovascular: Negative.    Genitourinary: Negative.    Musculoskeletal: Negative.        Objective   Blood pressure 147/76, pulse 86, temperature 97 °F (36.1 °C), temperature source Oral, height 167.6 cm (65.98\"), weight 108 kg (238 lb 9.6 oz), SpO2 97 %.  Physical Exam   Constitutional: He appears well-developed and well-nourished. No " distress.   Neck: No thyromegaly present.   Cardiovascular: Normal rate, regular rhythm, normal heart sounds and intact distal pulses.   Pulmonary/Chest: Effort normal and breath sounds normal.   Genitourinary:   Genitourinary Comments: Prostate examination.  Prostate is +3 size.  Smooth.  Symmetric.  No nodules.  No masses.  Nontender.   Musculoskeletal: He exhibits no edema.   Skin: Skin is warm and dry.   Psychiatric: He has a normal mood and affect. His behavior is normal. Judgment and thought content normal.   Nursing note and vitals reviewed.   urinalysis unremarkable exception of small leukocytes.    Assessment/Plan   Dick was seen today for hypothyroidism and cystitis.    Diagnoses and all orders for this visit:    Medicare annual wellness visit, subsequent    Cystitis  -     POC Urinalysis Dipstick, Automated  -     Urine Culture - Urine, Urine, Clean Catch    Familial hypercholesterolemia    Essential hypertension    Subclinical hypothyroidism    Lower urinary tract symptoms (LUTS)    Recurrent major depressive disorder, in full remission (CMS/[facility])    Other orders  -     Cancel: triamterene-hydrochlorothiazide (MAXZIDE-25) 37.5-25 MG per tablet; Take 1 tablet by mouth Daily.  -     tamsulosin (FLOMAX) 0.4 MG capsule 24 hr capsule; Take 1 capsule by mouth Every Night.  -     sertraline (ZOLOFT) 50 MG tablet; Take 1 tablet by mouth Daily.  -     rosuvastatin (CRESTOR) 5 MG tablet; Take 1 tablet by mouth Daily. Give generic when available  -     lisinopril (PRINIVIL,ZESTRIL) 40 MG tablet; Take 1 tablet by mouth Daily.  -     levothyroxine (SYNTHROID, LEVOTHROID) 25 MCG tablet; Take 1 tablet by mouth Daily.  -     amLODIPine (NORVASC) 5 MG tablet; Take 1 tablet by mouth Daily.      Hypertension.  Needs better control.  I'm concerned that the Maxide is causing increased urinary symptoms.  Continue tamsulosin.  Stop the Maxide.  Continue lisinopril 40 mg day.  Add amlodipine 5 mg a day.  Side effects  discussed.  See me in 3 months.  Check blood pressure at home and send readings.    Hypercholesterolemia.  Continue rosuvastatin.    Hypothyroidism.  Continue levothyroxine at low-dose.  TSH therapeutic.    Lower urinary tract symptoms.  See above discussion.  Send urine culture.  At this time no evidence of prostatitis or UTI likely.    Major depression.  In remission.  Continues maintenance with so.

## 2018-11-16 LAB
BACTERIA UR CULT: NORMAL
BACTERIA UR CULT: NORMAL

## 2018-11-16 NOTE — PROGRESS NOTES
The urine culture showed no bacteria causing an infection.  If any worsening urinary symptoms develop, please let us know

## 2018-11-19 RX ORDER — ATORVASTATIN CALCIUM 40 MG/1
40 TABLET, FILM COATED ORAL DAILY
Qty: 90 TABLET | Refills: 1 | Status: SHIPPED | OUTPATIENT
Start: 2018-11-19 | End: 2019-08-18 | Stop reason: SDUPTHER

## 2018-11-28 ENCOUNTER — OFFICE VISIT (OUTPATIENT)
Dept: SLEEP MEDICINE | Facility: HOSPITAL | Age: 73
End: 2018-11-28
Attending: INTERNAL MEDICINE

## 2018-11-28 VITALS
SYSTOLIC BLOOD PRESSURE: 140 MMHG | HEART RATE: 86 BPM | BODY MASS INDEX: 39.7 KG/M2 | DIASTOLIC BLOOD PRESSURE: 62 MMHG | WEIGHT: 247 LBS | HEIGHT: 66 IN

## 2018-11-28 DIAGNOSIS — Z99.89 OSA ON CPAP: ICD-10-CM

## 2018-11-28 DIAGNOSIS — G47.33 OSA ON CPAP: ICD-10-CM

## 2018-11-28 DIAGNOSIS — IMO0002 SLEEP-RELATED HYPOVENTILATION: Primary | ICD-10-CM

## 2018-11-28 PROCEDURE — 99213 OFFICE O/P EST LOW 20 MIN: CPT | Performed by: INTERNAL MEDICINE

## 2018-11-28 PROCEDURE — G0463 HOSPITAL OUTPT CLINIC VISIT: HCPCS

## 2018-12-15 ENCOUNTER — TELEPHONE (OUTPATIENT)
Dept: SLEEP MEDICINE | Facility: HOSPITAL | Age: 73
End: 2018-12-15

## 2018-12-15 NOTE — TELEPHONE ENCOUNTER
Overnight oximetry on auto CPAP on room air December 10, 2018 obtained.  Total valid sampling time of 8 hours and 29 minutes.  Low O2 saturation of 82% and no sleep-related hypoxemia identified.  Presently, the patient's auto titrating CPAP is adequately treating his low oxygen.  No new recommendations.

## 2018-12-17 ENCOUNTER — TELEPHONE (OUTPATIENT)
Dept: SLEEP MEDICINE | Facility: HOSPITAL | Age: 73
End: 2018-12-17

## 2018-12-17 NOTE — TELEPHONE ENCOUNTER
Spoke with patient wife about overnight oximeter results, auto cpap is adequately titrating patient. -AK

## 2019-05-03 ENCOUNTER — OFFICE VISIT (OUTPATIENT)
Dept: FAMILY MEDICINE CLINIC | Facility: CLINIC | Age: 74
End: 2019-05-03

## 2019-05-03 VITALS
HEART RATE: 77 BPM | WEIGHT: 235.1 LBS | BODY MASS INDEX: 37.78 KG/M2 | RESPIRATION RATE: 18 BRPM | TEMPERATURE: 98.4 F | SYSTOLIC BLOOD PRESSURE: 144 MMHG | HEIGHT: 66 IN | OXYGEN SATURATION: 97 % | DIASTOLIC BLOOD PRESSURE: 82 MMHG

## 2019-05-03 DIAGNOSIS — S76.311A PARTIAL HAMSTRING TEAR, RIGHT, INITIAL ENCOUNTER: Primary | ICD-10-CM

## 2019-05-03 PROCEDURE — 99213 OFFICE O/P EST LOW 20 MIN: CPT | Performed by: FAMILY MEDICINE

## 2019-05-03 RX ORDER — HYDROCODONE BITARTRATE AND ACETAMINOPHEN 5; 325 MG/1; MG/1
1 TABLET ORAL EVERY 6 HOURS PRN
Qty: 12 TABLET | Refills: 0 | Status: SHIPPED | OUTPATIENT
Start: 2019-05-03 | End: 2019-05-14

## 2019-05-03 NOTE — PROGRESS NOTES
"Subjective   Dick Galindo is a 73 y.o. male.     Chief Complaint   Patient presents with   • Knee Pain     right hamstring and knee pain, injury 4/22  cutting wood and stepped on wood and injury occurred        History of Present Illness      Injury to the right leg on April 22, almost 11 days ago.  He was cutting wood.  He slipped and stepped sideways and then felt something tear in the posterior aspect of his right leg, especially the lateral aspect.  It became painful felt a little weak.  Over the next day or 2 some ecchymosis developed along the knee and then down to the foot.  He went to urgent care recently.  He was given cyclobenzaprine which may be helps him at nighttime.    There is some swelling but not a great amount of swelling.  The pain is persistent but now somewhat improved.    The following portions of the patient's history were reviewed and updated as appropriate: allergies, current medications, past family history, past medical history, past social history, past surgical history and problem list.          Review of Systems   Musculoskeletal: Negative for joint swelling.   Neurological: Positive for weakness. Negative for numbness.       Objective   Blood pressure 144/82, pulse 77, temperature 98.4 °F (36.9 °C), temperature source Oral, resp. rate 18, height 167.6 cm (66\"), weight 107 kg (235 lb 1.6 oz), SpO2 97 %.  Physical Exam   Constitutional: No distress.   Cardiovascular: Normal rate.   Pulmonary/Chest: Effort normal.   Musculoskeletal:   Patient has full extension and full flexion of the right knee.  However against resistance he cannot keep the knee flexed.  He has pain to palpation along the lateral hamstring.  There is some localized ecchymosis they are also knee and also anterior foot.  Also the calf.  There is no particular calf muscle tenderness or swelling.  There is no lower extremity edema.  The popliteal fossa is unremarkable.   Skin: He is not diaphoretic.       Assessment/Plan "   Dick was seen today for knee pain.    Diagnoses and all orders for this visit:    Partial hamstring tear, right, initial encounter  -     Ambulatory Referral to Sports Medicine    Other orders  -     HYDROcodone-acetaminophen (NORCO) 5-325 MG per tablet; Take 1 tablet by mouth Every 6 (Six) Hours As Needed (leg injury pain).    Probable partial hamstring tear, right lower extremity.  Hydrocodone as needed for the pain, at nighttime.  Do not take while driving.  May be habit-forming.  May cause sedation and constipation.  Also referring to sports medicine for further evaluation.  He may benefit from physical therapy.  I do not think this has a surgical option for treatment, but I do want that investigated.  Otherwise I will see him back as scheduled.  There is no current clinical evidence of a deep venous thrombosis.  He can also take over-the-counter naproxen as directed.

## 2019-05-08 DIAGNOSIS — D50.0 IRON DEFICIENCY ANEMIA DUE TO CHRONIC BLOOD LOSS: ICD-10-CM

## 2019-05-08 DIAGNOSIS — E03.8 SUBCLINICAL HYPOTHYROIDISM: ICD-10-CM

## 2019-05-08 DIAGNOSIS — I10 ESSENTIAL HYPERTENSION: Primary | ICD-10-CM

## 2019-05-08 DIAGNOSIS — E78.01 FAMILIAL HYPERCHOLESTEROLEMIA: ICD-10-CM

## 2019-05-09 ENCOUNTER — OFFICE VISIT (OUTPATIENT)
Dept: SPORTS MEDICINE | Facility: CLINIC | Age: 74
End: 2019-05-09

## 2019-05-09 VITALS
DIASTOLIC BLOOD PRESSURE: 68 MMHG | BODY MASS INDEX: 37.77 KG/M2 | HEIGHT: 66 IN | WEIGHT: 235 LBS | SYSTOLIC BLOOD PRESSURE: 142 MMHG

## 2019-05-09 DIAGNOSIS — S76.301A RIGHT HAMSTRING INJURY, INITIAL ENCOUNTER: Primary | ICD-10-CM

## 2019-05-09 PROCEDURE — 72170 X-RAY EXAM OF PELVIS: CPT | Performed by: FAMILY MEDICINE

## 2019-05-09 PROCEDURE — 99213 OFFICE O/P EST LOW 20 MIN: CPT | Performed by: FAMILY MEDICINE

## 2019-05-09 PROCEDURE — 73560 X-RAY EXAM OF KNEE 1 OR 2: CPT | Performed by: FAMILY MEDICINE

## 2019-05-09 NOTE — PROGRESS NOTES
"Dick is a 73 y.o. year old male consultation for a problem new to this examiner.        Chief Complaint   Patient presents with   • Leg Pain     R Hamstring pull, Swelling, X April 22nd, no previous x-rays        History of Present Illness  HPI   Here today for right hamstring pain that started 2 to 3 weeks ago when he extended the hip pushing down on some branches while working in his yard.  He had immediate sharp painful pop that was moderately severe.  Worse with motion or direct pressure, slowly improving.  Associated with some swelling down the leg.  Initially had ecchymosis in the medial and posterior thigh.    I have reviewed the patient's medical, family, and social history in detail and updated the computerized patient record.    Review of Systems   Constitutional: Negative for fever.   Musculoskeletal:        Per HPI   Skin: Negative for wound.   Neurological: Negative for numbness.   All other systems reviewed and are negative.      /68 (BP Location: Left arm, Patient Position: Sitting, Cuff Size: Large Adult)   Ht 167.6 cm (65.98\")   Wt 107 kg (235 lb)   BMI 37.95 kg/m²      Physical Exam    Vital signs reviewed.   General: No acute distress.  Eyes: conjunctiva clear; pupils equally round and reactive  ENT: external ears and nose atraumatic; oropharynx clear  CV: no peripheral edema, 2+ distal pulses  Resp: normal respiratory effort, no use of accessory muscles  Skin: no rashes or wounds; normal turgor  Psych: mood and affect appropriate; recent and remote memory intact  Neuro: sensation to light touch intact    MSK Exam:  Ortho Exam  Right leg: There is some mild soft tissue swelling in the thigh and in the lower leg.  There is some skin changes consistent with resolving ecchymosis.  Tenderness palpation of the biceps femoris muscle belly, more than the tendon on either end.  Range of motion of the hip and knee are relatively well-maintained, although there is subjective tightness and " stiffness with hamstring stretching.  Strength is within normal limits with pain with hamstring activation.    Pelvis X-Ray  Indication: Pain  AP and Frogleg views    Findings:  No fracture  No bony lesion  Normal soft tissues  Mild degenerative changes, chronic appearing calcifications of the ischial tuberosities not consistent with acute avulsion    No prior studies were available for comparison.      Right Knee X-Ray  Indication: Pain    Views: AP and Lateral    Findings:  No fracture  No bony lesion  Normal soft tissues  Mild degenerative changes    No prior studies were available for comparison.    Diagnoses and all orders for this visit:    Right hamstring injury, initial encounter  -     XR Knee 1 or 2 View Right  -     XR Pelvis 1 or 2 View  -     Ambulatory Referral to Physical Therapy Evaluate and treat    Brief ultrasound does show some hematoma adjacent to the biceps femoris and the muscle belly.  Discussed adding isometric strengthening and limited stretching.  Could also add some compression for pain relief.  Will start physical therapy and check on his progress in about 4 weeks.      EMR Dragon/Transcription disclaimer:    Much of this encounter note is an electronic transcription/translation of spoken language to printed text.  The electronic translation of spoken language may permit erroneous, or at times, nonsensical words or phrases to be inadvertently transcribed.  Although I have reviewed the note for such errors some may still exist.

## 2019-05-10 LAB
ALBUMIN SERPL-MCNC: 3.8 G/DL (ref 3.5–5.2)
ALBUMIN/GLOB SERPL: 1.3 G/DL
ALP SERPL-CCNC: 82 U/L (ref 39–117)
ALT SERPL-CCNC: 18 U/L (ref 1–41)
AST SERPL-CCNC: 20 U/L (ref 1–40)
BASOPHILS # BLD AUTO: 0.11 10*3/MM3 (ref 0–0.2)
BASOPHILS NFR BLD AUTO: 1.6 % (ref 0–1.5)
BILIRUB SERPL-MCNC: 0.6 MG/DL (ref 0.2–1.2)
BUN SERPL-MCNC: 16 MG/DL (ref 8–23)
BUN/CREAT SERPL: 17.4 (ref 7–25)
CALCIUM SERPL-MCNC: 9.8 MG/DL (ref 8.6–10.5)
CHLORIDE SERPL-SCNC: 101 MMOL/L (ref 98–107)
CHOLEST SERPL-MCNC: 128 MG/DL (ref 0–200)
CO2 SERPL-SCNC: 30.4 MMOL/L (ref 22–29)
CREAT SERPL-MCNC: 0.92 MG/DL (ref 0.76–1.27)
EOSINOPHIL # BLD AUTO: 0.26 10*3/MM3 (ref 0–0.4)
EOSINOPHIL NFR BLD AUTO: 3.9 % (ref 0.3–6.2)
ERYTHROCYTE [DISTWIDTH] IN BLOOD BY AUTOMATED COUNT: 17.2 % (ref 12.3–15.4)
GLOBULIN SER CALC-MCNC: 2.9 GM/DL
GLUCOSE SERPL-MCNC: 88 MG/DL (ref 65–99)
HCT VFR BLD AUTO: 39.9 % (ref 37.5–51)
HDLC SERPL-MCNC: 53 MG/DL (ref 40–60)
HGB BLD-MCNC: 12.2 G/DL (ref 13–17.7)
IMM GRANULOCYTES # BLD AUTO: 0.02 10*3/MM3 (ref 0–0.05)
IMM GRANULOCYTES NFR BLD AUTO: 0.3 % (ref 0–0.5)
LDLC SERPL CALC-MCNC: 62 MG/DL (ref 0–100)
LYMPHOCYTES # BLD AUTO: 1.74 10*3/MM3 (ref 0.7–3.1)
LYMPHOCYTES NFR BLD AUTO: 25.9 % (ref 19.6–45.3)
MCH RBC QN AUTO: 27.7 PG (ref 26.6–33)
MCHC RBC AUTO-ENTMCNC: 30.6 G/DL (ref 31.5–35.7)
MCV RBC AUTO: 90.7 FL (ref 79–97)
MONOCYTES # BLD AUTO: 0.61 10*3/MM3 (ref 0.1–0.9)
MONOCYTES NFR BLD AUTO: 9.1 % (ref 5–12)
NEUTROPHILS # BLD AUTO: 3.97 10*3/MM3 (ref 1.7–7)
NEUTROPHILS NFR BLD AUTO: 59.2 % (ref 42.7–76)
NRBC BLD AUTO-RTO: 0 /100 WBC (ref 0–0.2)
PLATELET # BLD AUTO: 288 10*3/MM3 (ref 140–450)
POTASSIUM SERPL-SCNC: 4.3 MMOL/L (ref 3.5–5.2)
PROT SERPL-MCNC: 6.7 G/DL (ref 6–8.5)
RBC # BLD AUTO: 4.4 10*6/MM3 (ref 4.14–5.8)
SODIUM SERPL-SCNC: 142 MMOL/L (ref 136–145)
TRIGL SERPL-MCNC: 65 MG/DL (ref 0–150)
TSH SERPL DL<=0.005 MIU/L-ACNC: 4.66 MIU/ML (ref 0.27–4.2)
VLDLC SERPL CALC-MCNC: 13 MG/DL
WBC # BLD AUTO: 6.71 10*3/MM3 (ref 3.4–10.8)

## 2019-05-14 ENCOUNTER — OFFICE VISIT (OUTPATIENT)
Dept: FAMILY MEDICINE CLINIC | Facility: CLINIC | Age: 74
End: 2019-05-14

## 2019-05-14 VITALS
SYSTOLIC BLOOD PRESSURE: 141 MMHG | DIASTOLIC BLOOD PRESSURE: 75 MMHG | BODY MASS INDEX: 38.14 KG/M2 | HEIGHT: 66 IN | TEMPERATURE: 97.8 F | HEART RATE: 86 BPM | OXYGEN SATURATION: 95 % | WEIGHT: 237.3 LBS

## 2019-05-14 DIAGNOSIS — F33.42 RECURRENT MAJOR DEPRESSIVE DISORDER, IN FULL REMISSION (HCC): ICD-10-CM

## 2019-05-14 DIAGNOSIS — I10 ESSENTIAL HYPERTENSION: Primary | ICD-10-CM

## 2019-05-14 DIAGNOSIS — E78.01 FAMILIAL HYPERCHOLESTEROLEMIA: ICD-10-CM

## 2019-05-14 DIAGNOSIS — D64.9 ANEMIA, UNSPECIFIED TYPE: ICD-10-CM

## 2019-05-14 DIAGNOSIS — R39.9 LOWER URINARY TRACT SYMPTOMS (LUTS): ICD-10-CM

## 2019-05-14 PROBLEM — D50.0 IRON DEFICIENCY ANEMIA DUE TO CHRONIC BLOOD LOSS: Status: RESOLVED | Noted: 2018-01-18 | Resolved: 2019-05-14

## 2019-05-14 LAB
BASOPHILS # BLD AUTO: 0.09 10*3/MM3 (ref 0–0.2)
BASOPHILS NFR BLD AUTO: 1.4 % (ref 0–1.5)
EOSINOPHIL # BLD AUTO: 0.15 10*3/MM3 (ref 0–0.4)
EOSINOPHIL NFR BLD AUTO: 2.3 % (ref 0.3–6.2)
ERYTHROCYTE [DISTWIDTH] IN BLOOD BY AUTOMATED COUNT: 17.1 % (ref 12.3–15.4)
FERRITIN SERPL-MCNC: 84 NG/ML (ref 30–400)
HCT VFR BLD AUTO: 40.7 % (ref 37.5–51)
HGB BLD-MCNC: 12.7 G/DL (ref 13–17.7)
IMM GRANULOCYTES # BLD AUTO: 0.02 10*3/MM3 (ref 0–0.05)
IMM GRANULOCYTES NFR BLD AUTO: 0.3 % (ref 0–0.5)
IRON SATN MFR SERPL: 26 % (ref 20–50)
IRON SERPL-MCNC: 93 MCG/DL (ref 59–158)
LYMPHOCYTES # BLD AUTO: 1.42 10*3/MM3 (ref 0.7–3.1)
LYMPHOCYTES NFR BLD AUTO: 21.5 % (ref 19.6–45.3)
MCH RBC QN AUTO: 27.9 PG (ref 26.6–33)
MCHC RBC AUTO-ENTMCNC: 31.2 G/DL (ref 31.5–35.7)
MCV RBC AUTO: 89.5 FL (ref 79–97)
MONOCYTES # BLD AUTO: 0.57 10*3/MM3 (ref 0.1–0.9)
MONOCYTES NFR BLD AUTO: 8.6 % (ref 5–12)
NEUTROPHILS # BLD AUTO: 4.34 10*3/MM3 (ref 1.7–7)
NEUTROPHILS NFR BLD AUTO: 65.9 % (ref 42.7–76)
NRBC BLD AUTO-RTO: 0 /100 WBC (ref 0–0.2)
PLATELET # BLD AUTO: 271 10*3/MM3 (ref 140–450)
RBC # BLD AUTO: 4.55 10*6/MM3 (ref 4.14–5.8)
TIBC SERPL-MCNC: 357 MCG/DL
UIBC SERPL-MCNC: 264 MCG/DL (ref 112–346)
WBC # BLD AUTO: 6.59 10*3/MM3 (ref 3.4–10.8)

## 2019-05-14 PROCEDURE — 99214 OFFICE O/P EST MOD 30 MIN: CPT | Performed by: FAMILY MEDICINE

## 2019-05-14 RX ORDER — AMLODIPINE BESYLATE 5 MG/1
5 TABLET ORAL DAILY
Qty: 90 TABLET | Refills: 1 | Status: SHIPPED | OUTPATIENT
Start: 2019-05-14 | End: 2019-05-30

## 2019-05-14 RX ORDER — TAMSULOSIN HYDROCHLORIDE 0.4 MG/1
1 CAPSULE ORAL NIGHTLY
Qty: 90 CAPSULE | Refills: 1 | Status: SHIPPED | OUTPATIENT
Start: 2019-05-14 | End: 2019-11-12 | Stop reason: SDUPTHER

## 2019-05-14 RX ORDER — LISINOPRIL 40 MG/1
40 TABLET ORAL DAILY
COMMUNITY
Start: 2019-03-09 | End: 2019-05-14 | Stop reason: SDUPTHER

## 2019-05-14 RX ORDER — LISINOPRIL 40 MG/1
40 TABLET ORAL DAILY
Qty: 90 TABLET | Refills: 1 | Status: SHIPPED | OUTPATIENT
Start: 2019-05-14 | End: 2019-11-12 | Stop reason: SDUPTHER

## 2019-05-14 RX ORDER — LEVOTHYROXINE SODIUM 0.03 MG/1
25 TABLET ORAL DAILY
Qty: 90 TABLET | Refills: 1 | Status: SHIPPED | OUTPATIENT
Start: 2019-05-14 | End: 2019-08-09

## 2019-05-14 NOTE — PROGRESS NOTES
Subjective   Dick Galindo is a 73 y.o. male.     Hypothyroidism (follow up labs); Hypertension; and Anemia    History of Present Illness     Patient was found to be anemic in late 2017 with a hemoglobin 11.8.  He was sent for scopes.  The EGD did not reveal any source of bleeding.  The colonoscopy revealed a number of polyps, mostly hyperplastic.  There is 1 adenoma with low-grade dysplasia.  However there is no source of bleeding.  His ferritin level was low.  He is placed on iron supplementation for a number of weeks and it got better.  Hemoglobin up to 14.  He had a hamstring injury about a month ago.  He had a lot of swelling and bruising and ecchymosis in his lower extremity.  That is getting better.  Most recently the hemoglobin is 12 with a normal mean cell volume.  He denies any recent bleeding otherwise.  No blood in the stool.  No dark black stools.      Hypothyroidism followup.  Taking levothyroxine as indicated.  No symptoms of high or low thyroid.  The TSH which is slightly high.  Last TSH as follows:    Lab Results   Component Value Date    TSH 4.660 (H) 05/09/2019          Hypertension follow up. Doing well with current medication which he is taking as directed.  Blood pressure has been running about 140 systolic here in the office at times, but is not checking at home.  No cardiovascular or neurological symptoms. Today's BP: 141/75.      Hyperlipidemia follow up. He is taking statin medication without complaint. No myopathy symptoms.     Last lipid panel:   Lab Results   Component Value Date    HDL 53 05/09/2019     Lab Results   Component Value Date    LDL 62 05/09/2019     Lab Results   Component Value Date    TRIG 65 05/09/2019       The following portions of the patient's history were reviewed and updated as appropriate: allergies, current medications, past family history, past medical history, past social history, past surgical history and problem list.      Review of Systems    Objective  "  Blood pressure 141/75, pulse 86, temperature 97.8 °F (36.6 °C), temperature source Oral, height 167.6 cm (65.98\"), weight 108 kg (237 lb 4.8 oz), SpO2 95 %.  Physical Exam    Assessment/Plan   Dick was seen today for hypothyroidism, hypertension and anemia.    Diagnoses and all orders for this visit:    Essential hypertension    Familial hypercholesterolemia    Lower urinary tract symptoms (LUTS)    Recurrent major depressive disorder, in full remission (CMS/HCC)    Anemia, unspecified type  -     CBC & Differential  -     Iron and TIBC  -     Ferritin    Other orders  -     tamsulosin (FLOMAX) 0.4 MG capsule 24 hr capsule; Take 1 capsule by mouth Every Night.  -     sertraline (ZOLOFT) 50 MG tablet; Take 1 tablet by mouth Daily.  -     lisinopril (PRINIVIL,ZESTRIL) 40 MG tablet; Take 1 tablet by mouth Daily.  -     amLODIPine (NORVASC) 5 MG tablet; Take 1 tablet by mouth Daily.  -     levothyroxine (SYNTHROID, LEVOTHROID) 25 MCG tablet; Take 1 tablet by mouth Daily.      Hypertension.  May need better control.  I recommend he check his blood pressures at home and send us readings within a few weeks.  If needed we can increase the amlodipine.    Hyperlipidemia.  Continue the statin.    Lower urinary tract symptoms.  Stable.  Continue Flomax.    Major depression.  In remission.  Continuing sertraline 50 mg daily.    Anemia.  Possibly related to the recent hamstring injury with the ecchymosis in the lower extremities.  He did have a hamstring hematoma.  His only a slight drop in his hemoglobin.  Recheck in hemoglobin today along with an iron panel.  To consider restarting iron supplementation.  If hemoglobin drops more, would recommend repeat GI work-up or other hematology work-up as indicated.  Otherwise see me in 6 months for recheck and wellness visit         "

## 2019-05-15 NOTE — PROGRESS NOTES
The mild anemia is improving.  There is no evidence of iron deficiency.  This certainly could be from the recent leg hematoma.

## 2019-05-30 RX ORDER — AMLODIPINE BESYLATE 10 MG/1
10 TABLET ORAL DAILY
Qty: 90 TABLET | Refills: 1 | Status: SHIPPED | OUTPATIENT
Start: 2019-05-30 | End: 2019-11-12 | Stop reason: SDUPTHER

## 2019-06-06 ENCOUNTER — OFFICE VISIT (OUTPATIENT)
Dept: SPORTS MEDICINE | Facility: CLINIC | Age: 74
End: 2019-06-06

## 2019-06-06 VITALS
WEIGHT: 231 LBS | HEART RATE: 76 BPM | OXYGEN SATURATION: 99 % | BODY MASS INDEX: 37.12 KG/M2 | HEIGHT: 66 IN | SYSTOLIC BLOOD PRESSURE: 120 MMHG | DIASTOLIC BLOOD PRESSURE: 64 MMHG

## 2019-06-06 DIAGNOSIS — S76.301D RIGHT HAMSTRING INJURY, SUBSEQUENT ENCOUNTER: Primary | ICD-10-CM

## 2019-06-06 PROCEDURE — 99213 OFFICE O/P EST LOW 20 MIN: CPT | Performed by: FAMILY MEDICINE

## 2019-06-06 NOTE — PROGRESS NOTES
"Dick is a 73 y.o. year old male follow up on a problem familiar to this examiner.         Chief Complaint   Patient presents with   • Follow-up     4 week f/u hamstring        History of Present Illness  HPI   Here today to follow-up on right intramuscular hamstring tear.  Since his last visit he is progressed well.  He has been compliant with physical therapy, has minimal tight pain worse with activities, rapidly improving function.      I have reviewed the patient's medical, family, and social history in detail and updated the computerized patient record.    Review of Systems   Constitutional: Negative.    Neurological: Negative.        /64   Pulse 76   Ht 167.6 cm (65.98\")   Wt 105 kg (231 lb)   SpO2 99%   BMI 37.30 kg/m²       Physical Exam    Vital signs reviewed.   General: No acute distress.  Eyes: conjunctiva clear; pupils equally round and reactive  ENT: external ears and nose atraumatic; oropharynx clear  CV: no peripheral edema, 2+ distal pulses  Resp: normal respiratory effort, no use of accessory muscles  Skin: no rashes or wounds; normal turgor  Psych: mood and affect appropriate; recent and remote memory intact  Neuro: sensation to light touch intact    [facility] Exam:  Ortho Exam  Right thigh: Normal appearance.  Minimal tenderness in the hamstring muscle belly.  Normal range of motion.  Normal strength without pain.      Diagnoses and all orders for this visit:    Right hamstring injury, subsequent encounter      Improving well with treatment.  Continue transition to home exercises.  Follow-up as needed.    EMR Dragon/Transcription disclaimer:    Much of this encounter note is an electronic transcription/translation of spoken language to printed text.  The electronic translation of spoken language may permit erroneous, or at times, nonsensical words or phrases to be inadvertently transcribed.  Although I have reviewed the note for such errors some may still exist.    "

## 2019-08-09 ENCOUNTER — OFFICE VISIT (OUTPATIENT)
Dept: FAMILY MEDICINE CLINIC | Facility: CLINIC | Age: 74
End: 2019-08-09

## 2019-08-09 VITALS
HEIGHT: 66 IN | TEMPERATURE: 97.7 F | SYSTOLIC BLOOD PRESSURE: 149 MMHG | DIASTOLIC BLOOD PRESSURE: 80 MMHG | WEIGHT: 231.9 LBS | OXYGEN SATURATION: 96 % | BODY MASS INDEX: 37.27 KG/M2 | HEART RATE: 75 BPM

## 2019-08-09 DIAGNOSIS — I10 ESSENTIAL HYPERTENSION: Primary | ICD-10-CM

## 2019-08-09 DIAGNOSIS — E03.8 SUBCLINICAL HYPOTHYROIDISM: ICD-10-CM

## 2019-08-09 PROCEDURE — 99213 OFFICE O/P EST LOW 20 MIN: CPT | Performed by: FAMILY MEDICINE

## 2019-08-09 RX ORDER — LEVOTHYROXINE SODIUM 0.05 MG/1
50 TABLET ORAL DAILY
Qty: 90 TABLET | Refills: 1 | Status: SHIPPED | OUTPATIENT
Start: 2019-08-09 | End: 2019-11-12 | Stop reason: SDUPTHER

## 2019-08-09 NOTE — PROGRESS NOTES
"Subjective   Dick Galindo is a 74 y.o. male.     Chief Complaint   Patient presents with   • Joint Swelling     bilateral ankles on and off pt is fasting        History of Present Illness      Follow-up hypertension.  About 6 months ago we stopped his Maxide.  Because of lower urinary tract symptoms and nocturia.  Switch to amlodipine.  5 mg a day.  He continued lisinopril 40 mg a day.  I saw him a few months ago.  Blood pressure was still high.  He sent me readings from home.  They are running in the 140 range.  We increase his amlodipine from 5 mg up to 10 mg a day.  Some blood pressure mostly in the 130 systolic range.  He is now retired.  He is exercising more.  He plans on losing weight.  Recently had some ankle swelling that did not bother him.  It is better now.  No shortness of breath.  No exertional fatigue.  No chest pain.  No palpitations.      Hypothyroidism followup.  Taking levothyroxine as indicated.  No symptoms of high or low thyroid.  Last TSH as follows:    Lab Results   Component Value Date    TSH 4.660 (H) 05/09/2019            The following portions of the patient's history were reviewed and updated as appropriate: allergies, current medications, past family history, past medical history, past social history, past surgical history and problem list.          Review of Systems   Constitutional: Negative.    Respiratory: Negative.    Cardiovascular: Positive for leg swelling.   Musculoskeletal: Negative.        Objective   Blood pressure 149/80, pulse 75, temperature 97.7 °F (36.5 °C), temperature source Oral, height 167.6 cm (65.98\"), weight 105 kg (231 lb 14.4 oz), SpO2 96 %.  Physical Exam   Constitutional: He appears well-developed and well-nourished. No distress.   Neck: No thyromegaly present.   Cardiovascular: Normal rate, regular rhythm, normal heart sounds and intact distal pulses.   No peripheral edema noted today.   Pulmonary/Chest: Effort normal and breath sounds normal. "   Musculoskeletal: He exhibits no edema.   Skin: Skin is warm and dry.   Psychiatric: He has a normal mood and affect. His behavior is normal. Judgment and thought content normal.   Nursing note and vitals reviewed.      Assessment/Plan   Dick was seen today for joint swelling.    Diagnoses and all orders for this visit:    Essential hypertension  -     Comprehensive Metabolic Panel; Future  -     Lipid Panel; Future  -     TSH Rfx On Abnormal To Free T4; Future    Subclinical hypothyroidism  -     TSH Rfx On Abnormal To Free T4; Future    Other orders  -     levothyroxine (SYNTHROID, LEVOTHROID) 50 MCG tablet; Take 1 tablet by mouth Daily.      Hypertension.  Better control.  Systolic blood pressure running about 130 at home.  Continue amlodipine 10 mg a day.  The ankle swelling is nuisance level only and not bothering the patient.  The ankle swelling is very likely from the amlodipine.  The goal now is to lose weight through diet and exercise and lower carbohydrates.  I will see him back in 3 months.  If blood pressure is getting worse, will need further adjustment.    Subclinical hypothyroidism.  His TSH has been borderline high if not high for about 2 or 3 years.  Recommend increasing his levothyroxine from 25 mcg a day up to 50 mcg a day.  Will check TSH prior to next visit in 3 months.

## 2019-08-19 RX ORDER — ATORVASTATIN CALCIUM 40 MG/1
TABLET, FILM COATED ORAL
Qty: 90 TABLET | Refills: 1 | Status: SHIPPED | OUTPATIENT
Start: 2019-08-19 | End: 2019-11-12 | Stop reason: SDUPTHER

## 2019-10-22 ENCOUNTER — FLU SHOT (OUTPATIENT)
Dept: FAMILY MEDICINE CLINIC | Facility: CLINIC | Age: 74
End: 2019-10-22

## 2019-10-22 DIAGNOSIS — Z23 NEED FOR VACCINATION: Primary | ICD-10-CM

## 2019-10-22 PROCEDURE — G0008 ADMIN INFLUENZA VIRUS VAC: HCPCS | Performed by: FAMILY MEDICINE

## 2019-10-22 PROCEDURE — 90653 IIV ADJUVANT VACCINE IM: CPT | Performed by: FAMILY MEDICINE

## 2019-11-06 LAB
ALBUMIN SERPL-MCNC: 4.5 G/DL (ref 3.5–4.8)
ALBUMIN/GLOB SERPL: 2 {RATIO} (ref 1.2–2.2)
ALP SERPL-CCNC: 74 IU/L (ref 39–117)
ALT SERPL-CCNC: 25 IU/L (ref 0–44)
AST SERPL-CCNC: 24 IU/L (ref 0–40)
BILIRUB SERPL-MCNC: 0.5 MG/DL (ref 0–1.2)
BUN SERPL-MCNC: 17 MG/DL (ref 8–27)
BUN/CREAT SERPL: 17 (ref 10–24)
CALCIUM SERPL-MCNC: 9.4 MG/DL (ref 8.6–10.2)
CHLORIDE SERPL-SCNC: 104 MMOL/L (ref 96–106)
CHOLEST SERPL-MCNC: 135 MG/DL (ref 100–199)
CO2 SERPL-SCNC: 27 MMOL/L (ref 20–29)
CREAT SERPL-MCNC: 1.02 MG/DL (ref 0.76–1.27)
GLOBULIN SER CALC-MCNC: 2.2 G/DL (ref 1.5–4.5)
GLUCOSE SERPL-MCNC: 97 MG/DL (ref 65–99)
HDLC SERPL-MCNC: 62 MG/DL
LDLC SERPL CALC-MCNC: 57 MG/DL (ref 0–99)
POTASSIUM SERPL-SCNC: 4.2 MMOL/L (ref 3.5–5.2)
PROT SERPL-MCNC: 6.7 G/DL (ref 6–8.5)
SODIUM SERPL-SCNC: 145 MMOL/L (ref 134–144)
TRIGL SERPL-MCNC: 78 MG/DL (ref 0–149)
TSH SERPL DL<=0.005 MIU/L-ACNC: 3.42 UIU/ML (ref 0.45–4.5)
VLDLC SERPL CALC-MCNC: 16 MG/DL (ref 5–40)

## 2019-11-07 ENCOUNTER — RESULTS ENCOUNTER (OUTPATIENT)
Dept: FAMILY MEDICINE CLINIC | Facility: CLINIC | Age: 74
End: 2019-11-07

## 2019-11-07 DIAGNOSIS — I10 ESSENTIAL HYPERTENSION: ICD-10-CM

## 2019-11-07 DIAGNOSIS — E03.8 SUBCLINICAL HYPOTHYROIDISM: ICD-10-CM

## 2019-11-12 ENCOUNTER — OFFICE VISIT (OUTPATIENT)
Dept: FAMILY MEDICINE CLINIC | Facility: CLINIC | Age: 74
End: 2019-11-12

## 2019-11-12 VITALS
TEMPERATURE: 97.8 F | HEIGHT: 66 IN | SYSTOLIC BLOOD PRESSURE: 150 MMHG | OXYGEN SATURATION: 98 % | BODY MASS INDEX: 37.85 KG/M2 | HEART RATE: 71 BPM | WEIGHT: 235.5 LBS | DIASTOLIC BLOOD PRESSURE: 79 MMHG

## 2019-11-12 DIAGNOSIS — Z87.891 PERSONAL HISTORY OF SMOKING: ICD-10-CM

## 2019-11-12 DIAGNOSIS — Z23 NEED FOR VACCINATION: ICD-10-CM

## 2019-11-12 DIAGNOSIS — E03.8 SUBCLINICAL HYPOTHYROIDISM: ICD-10-CM

## 2019-11-12 DIAGNOSIS — I10 ESSENTIAL HYPERTENSION: ICD-10-CM

## 2019-11-12 DIAGNOSIS — Z00.00 MEDICARE ANNUAL WELLNESS VISIT, SUBSEQUENT: Primary | ICD-10-CM

## 2019-11-12 DIAGNOSIS — E78.01 FAMILIAL HYPERCHOLESTEROLEMIA: ICD-10-CM

## 2019-11-12 DIAGNOSIS — F33.42 RECURRENT MAJOR DEPRESSIVE DISORDER, IN FULL REMISSION (HCC): ICD-10-CM

## 2019-11-12 DIAGNOSIS — R39.9 LOWER URINARY TRACT SYMPTOMS (LUTS): ICD-10-CM

## 2019-11-12 PROCEDURE — 90732 PPSV23 VACC 2 YRS+ SUBQ/IM: CPT | Performed by: FAMILY MEDICINE

## 2019-11-12 PROCEDURE — G0439 PPPS, SUBSEQ VISIT: HCPCS | Performed by: FAMILY MEDICINE

## 2019-11-12 PROCEDURE — G0009 ADMIN PNEUMOCOCCAL VACCINE: HCPCS | Performed by: FAMILY MEDICINE

## 2019-11-12 PROCEDURE — 99214 OFFICE O/P EST MOD 30 MIN: CPT | Performed by: FAMILY MEDICINE

## 2019-11-12 RX ORDER — AMLODIPINE BESYLATE 10 MG/1
10 TABLET ORAL DAILY
Qty: 90 TABLET | Refills: 1 | Status: SHIPPED | OUTPATIENT
Start: 2019-11-12 | End: 2020-05-09 | Stop reason: SDUPTHER

## 2019-11-12 RX ORDER — TAMSULOSIN HYDROCHLORIDE 0.4 MG/1
1 CAPSULE ORAL NIGHTLY
Qty: 90 CAPSULE | Refills: 1 | Status: SHIPPED | OUTPATIENT
Start: 2019-11-12 | End: 2020-05-27

## 2019-11-12 RX ORDER — HYDROCHLOROTHIAZIDE 12.5 MG/1
12.5 TABLET ORAL DAILY
Qty: 90 TABLET | Refills: 1 | Status: SHIPPED | OUTPATIENT
Start: 2019-11-12 | End: 2020-02-12

## 2019-11-12 RX ORDER — LISINOPRIL 40 MG/1
40 TABLET ORAL DAILY
Qty: 90 TABLET | Refills: 1 | Status: SHIPPED | OUTPATIENT
Start: 2019-11-12 | End: 2020-05-09 | Stop reason: SDUPTHER

## 2019-11-12 RX ORDER — LEVOTHYROXINE SODIUM 0.05 MG/1
50 TABLET ORAL DAILY
Qty: 90 TABLET | Refills: 1 | Status: SHIPPED | OUTPATIENT
Start: 2019-11-12 | End: 2020-05-09 | Stop reason: SDUPTHER

## 2019-11-12 RX ORDER — ATORVASTATIN CALCIUM 40 MG/1
40 TABLET, FILM COATED ORAL DAILY
Qty: 90 TABLET | Refills: 1 | Status: SHIPPED | OUTPATIENT
Start: 2019-11-12 | End: 2020-05-09 | Stop reason: SDUPTHER

## 2019-11-12 NOTE — PROGRESS NOTES
"Subjective   Dick Galindo is a 74 y.o. male.     Medicare Wellness-subsequent (follow up labs) and Sinus Problem    History of Present Illness    Hypertension follow up. Doing well with current medication which he is taking as directed.  Blood pressure at home is running about 150 systolic on average.  No cardiovascular or neurological symptoms. Today's BP: 150/79.      Hyperlipidemia follow up. He is taking statin medication without complaint. No myopathy symptoms.     Lab Results   Component Value Date    CHLPL 135 11/05/2019    TRIG 78 11/05/2019    HDL 62 11/05/2019    LDL 57 11/05/2019     Hypothyroidism followup.  Taking levothyroxine as indicated.  No symptoms of high or low thyroid.  Last TSH as follows:    Lab Results   Component Value Date    TSH 3.420 11/05/2019      Major depression.  Previously in remission.  Continues sertraline 50 mg daily.    Lower urinary tract symptoms.  Continues Flomax 0.4 mg nightly.  He has nocturia x2 which is unchanged.  He has minimal daytime urinary tract symptoms.  He used to take triamterene hydrochlorothiazide a couple years ago.  Never had trouble with making his urine symptoms worse.    The following portions of the patient's history were reviewed and updated as appropriate: allergies, current medications, past family history, past medical history, past social history, past surgical history and problem list.      Review of Systems   Constitutional: Negative.    Respiratory: Negative.    Cardiovascular: Negative.    Musculoskeletal: Negative.        Objective   Blood pressure 150/79, pulse 71, temperature 97.8 °F (36.6 °C), temperature source Oral, height 167.6 cm (65.98\"), weight 107 kg (235 lb 8 oz), SpO2 98 %.  Physical Exam   Constitutional: He appears well-developed and well-nourished. No distress.   Neck: No thyromegaly present.   Cardiovascular: Normal rate, regular rhythm, normal heart sounds and intact distal pulses.   Pulmonary/Chest: Effort normal and " breath sounds normal.   Musculoskeletal: He exhibits no edema.   Skin: Skin is warm and dry.   Psychiatric: He has a normal mood and affect. His behavior is normal. Judgment and thought content normal.   Nursing note and vitals reviewed.      Assessment/Plan   Dick was seen today for medicare wellness-subsequent and sinus problem.    Diagnoses and all orders for this visit:    Medicare annual wellness visit, subsequent    Recurrent major depressive disorder, in full remission (CMS/[facility])    Subclinical hypothyroidism    Lower urinary tract symptoms (LUTS)    Familial hypercholesterolemia    Essential hypertension    Personal history of smoking  -     [place] aaa screen limited; Future    Other orders  -     tamsulosin (FLOMAX) 0.4 MG capsule 24 hr capsule; Take 1 capsule by mouth Every Night.  -     sertraline (ZOLOFT) 50 MG tablet; Take 1 tablet by mouth Daily.  -     lisinopril (PRINIVIL,ZESTRIL) 40 MG tablet; Take 1 tablet by mouth Daily.  -     amLODIPine (NORVASC) 10 MG tablet; Take 1 tablet by mouth Daily.  -     levothyroxine (SYNTHROID, LEVOTHROID) 50 MCG tablet; Take 1 tablet by mouth Daily.  -     atorvastatin (LIPITOR) 40 MG tablet; Take 1 tablet by mouth Daily.  -     hydroCHLOROthiazide (HYDRODIURIL) 12.5 MG tablet; Take 1 tablet by mouth Daily.      Hypertension.  Needs better control.  Continuing current medications and adding hydrochlorothiazide 12.5 mg a day.  He will call with worsening lower urinary tract symptoms.  He will send us readings in a few weeks.  I will see him in 3 months for blood pressure recheck.    Major depression.  Full remission.  Continue Zoloft.    Hypothyroidism.  He continues levothyroxine.  TSH therapeutic.    Lower urinary tract symptoms.  See above discussion.    Hyperlipidemia.  Stable.

## 2019-11-12 NOTE — PROGRESS NOTES
The ABCs of the Annual Wellness Visit  Subsequent Medicare Wellness Visit    Chief Complaint   Patient presents with   • Medicare Wellness-subsequent     follow up labs   • Sinus Problem       Subjective   History of Present Illness:  Dick Galindo is a 74 y.o. male who presents for a Subsequent Medicare Wellness Visit.    HEALTH RISK ASSESSMENT    Recent Hospitalizations:  No hospitalization(s) within the last year.    Current Medical Providers:  Patient Care Team:  Mynor Russ MD as PCP - General (Family Medicine)  Mynor Russ MD as PCP - Claims Attributed    Smoking Status:  Social History     Tobacco Use   Smoking Status Former Smoker   Smokeless Tobacco Never Used   Tobacco Comment    He smoked between the ages of 18 and 32.       Alcohol Consumption:  Social History     Substance and Sexual Activity   Alcohol Use Yes    Comment: He will have 8-10 drinks per week.       Depression Screen:   PHQ-2/PHQ-9 Depression Screening 11/12/2019   Little interest or pleasure in doing things 0   Feeling down, depressed, or hopeless 0   Total Score 0       Fall Risk Screen:  BRISA Fall Risk Assessment was completed, and patient is at HIGH risk for falls. Assessment completed on:11/12/2019    Health Habits and Functional and Cognitive Screening:  Functional & Cognitive Status 11/12/2019   Do you have difficulty preparing food and eating? No   Do you have difficulty bathing yourself, getting dressed or grooming yourself? No   Do you have difficulty using the toilet? No   Do you have difficulty moving around from place to place? No   Do you have trouble with steps or getting out of a bed or a chair? No   Current Diet Well Balanced Diet   Dental Exam Up to date   Eye Exam Not up to date   Exercise (times per week) 2 times per week   Current Exercise Activities Include Cardiovasular Workout on Exercise Equipment   Do you need help using the phone?  No   Are you deaf or do you have serious difficulty hearing?  No   Do  you need help with transportation? No   Do you need help shopping? No   Do you need help preparing meals?  No   Do you need help with housework?  No   Do you need help with laundry? No   Do you need help taking your medications? No   Do you need help managing money? No   Do you ever drive or ride in a car without wearing a seat belt? No   Have you felt unusual stress, anger or loneliness in the last month? No   Who do you live with? Spouse   If you need help, do you have trouble finding someone available to you? No   Have you been bothered in the last four weeks by sexual problems? No   Do you have difficulty concentrating, remembering or making decisions? -         Does the patient have evidence of cognitive impairment? No    Asprin use counseling:Does not need ASA (and currently is not on it)    Age-appropriate Screening Schedule:  Refer to the list below for future screening recommendations based on patient's age, sex and/or medical conditions. Orders for these recommended tests are listed in the plan section. The patient has been provided with a written plan.    Health Maintenance   Topic Date Due   • TDAP/TD VACCINES (1 - Tdap) 06/22/1966   • ZOSTER VACCINE (1 of 2) 06/22/1997   • PNEUMOCOCCAL VACCINES (65+ LOW/MEDIUM RISK) (2 of 2 - PPSV23) 02/12/2019   • COLONOSCOPY  02/06/2020   • LIPID PANEL  11/05/2020   • INFLUENZA VACCINE  Completed          The following portions of the patient's history were reviewed and updated as appropriate: allergies, current medications, past family history, past medical history, past social history, past surgical history and problem list.    Outpatient Medications Prior to Visit   Medication Sig Dispense Refill   • acetaminophen (TYLENOL) 325 MG tablet Take 650 mg by mouth Every 6 (Six) Hours As Needed.     • cetirizine (zyrTEC) 10 MG tablet Take 10 mg by mouth Daily.     • VENTOLIN  (90 Base) MCG/ACT inhaler 2 puffs Q4H for cough and wheeze 18 g 3   • amLODIPine (NORVASC)  "10 MG tablet Take 1 tablet by mouth Daily. 90 tablet 1   • atorvastatin (LIPITOR) 40 MG tablet TAKE ONE TABLET BY MOUTH DAILY 90 tablet 1   • levothyroxine (SYNTHROID, LEVOTHROID) 50 MCG tablet Take 1 tablet by mouth Daily. 90 tablet 1   • lisinopril (PRINIVIL,ZESTRIL) 40 MG tablet Take 1 tablet by mouth Daily. 90 tablet 1   • sertraline (ZOLOFT) 50 MG tablet Take 1 tablet by mouth Daily. 90 tablet 1   • tamsulosin (FLOMAX) 0.4 MG capsule 24 hr capsule Take 1 capsule by mouth Every Night. 90 capsule 1     No facility-administered medications prior to visit.        Patient Active Problem List   Diagnosis   • Spinal stenosis of lumbar region   • Essential hypertension   • Lower urinary tract symptoms (LUTS)   • Familial hypercholesterolemia   • Recurrent major depressive disorder, in full remission (CMS/HCC)   • Hyperplastic colon polyp   • Gastroesophageal reflux disease without esophagitis   • Subclinical hypothyroidism   • Severe ROMINA on auto CPAP   • Sleep-related hypoxia       Advanced Care Planning:  Patient does not have an advance directive - information provided to the patient today    Review of Systems    Compared to one year ago, the patient feels his physical health is the same.  Compared to one year ago, the patient feels his mental health is the same.    Reviewed chart for potential of high risk medication in the elderly: yes  Reviewed chart for potential of harmful drug interactions in the elderly:yes    Objective         Vitals:    11/12/19 1000   BP: 150/79   Pulse: 71   Temp: 97.8 °F (36.6 °C)   TempSrc: Oral   SpO2: 98%   Weight: 107 kg (235 lb 8 oz)   Height: 167.6 cm (65.98\")       Body mass index is 38.03 kg/m².  Discussed the patient's BMI with him. The BMI Is elevated.  Exercise indicated.    Physical Exam    Lab Results   Component Value Date    GLU 97 11/05/2019    CHLPL 135 11/05/2019    TRIG 78 11/05/2019    HDL 62 11/05/2019    LDL 57 11/05/2019    VLDL 16 11/05/2019        Assessment/Plan "   Medicare Risks and Personalized Health Plan  CMS Preventative Services Quick Reference  Immunizations Discussed/Encouraged (specific immunizations; Pneumococcal 23 )   He is continuing regular exercise.  He goes 2 or 3 times a week to the [facility]  Recommend Shingrix at retail pharmacy  Previous smoker.  Abdominal aortic aneurysm screen ordered.    The above risks/problems have been discussed with the patient.  Pertinent information has been shared with the patient in the After Visit Summary.  Follow up plans and orders are seen below in the Assessment/Plan Section.    Diagnoses and all orders for this visit:    1. Medicare annual wellness visit, subsequent (Primary)    2. Recurrent major depressive disorder, in full remission (CMS/[facility])    3. Subclinical hypothyroidism    4. Lower urinary tract symptoms (LUTS)    5. Familial hypercholesterolemia    6. Essential hypertension    7. Personal history of smoking  -     [place] aaa screen limited; Future    Other orders  -     tamsulosin (FLOMAX) 0.4 MG capsule 24 hr capsule; Take 1 capsule by mouth Every Night.  Dispense: 90 capsule; Refill: 1  -     sertraline (ZOLOFT) 50 MG tablet; Take 1 tablet by mouth Daily.  Dispense: 90 tablet; Refill: 1  -     lisinopril (PRINIVIL,ZESTRIL) 40 MG tablet; Take 1 tablet by mouth Daily.  Dispense: 90 tablet; Refill: 1  -     amLODIPine (NORVASC) 10 MG tablet; Take 1 tablet by mouth Daily.  Dispense: 90 tablet; Refill: 1  -     levothyroxine (SYNTHROID, LEVOTHROID) 50 MCG tablet; Take 1 tablet by mouth Daily.  Dispense: 90 tablet; Refill: 1  -     atorvastatin (LIPITOR) 40 MG tablet; Take 1 tablet by mouth Daily.  Dispense: 90 tablet; Refill: 1  -     hydroCHLOROthiazide (HYDRODIURIL) 12.5 MG tablet; Take 1 tablet by mouth Daily.  Dispense: 90 tablet; Refill: 1      Follow Up:  Return in about 3 months (around 2/12/2020) for Recheck.     An After Visit Summary and PPPS were given to the patient.

## 2019-11-21 ENCOUNTER — HOSPITAL ENCOUNTER (OUTPATIENT)
Dept: ULTRASOUND IMAGING | Facility: HOSPITAL | Age: 74
Discharge: HOME | End: 2019-11-21
Admitting: FAMILY MEDICINE

## 2019-11-21 DIAGNOSIS — Z87.891 PERSONAL HISTORY OF SMOKING: ICD-10-CM

## 2019-11-21 PROCEDURE — 76706 US ABDL AORTA SCREEN AAA: CPT

## 2019-11-22 DIAGNOSIS — N28.1 BILATERAL RENAL CYSTS: Primary | ICD-10-CM

## 2019-11-22 NOTE — PROGRESS NOTES
Please call patient......  The ultrasound did not show any abnormalities on most of the aorta.  However they could not see the further part of the aorta because of some overlying bowel gas, common finding.  In addition there appeared to be multiple bilateral renal cysts, which could very well have been there for many years and may never cause trouble.  Your kidney function is normal on the blood test.  However the radiologist recommends a CT scan of the abdomen and pelvis for further characterization.  This is a reasonable thing.  I have ordered the test.

## 2019-11-22 NOTE — PROGRESS NOTES
Please call patient .....the abdominal aortic aneurysm screen was negative, but they could not see the further part of the aorta.  Also bilateral kidney cysts were noted, likely have been there a long time and may never cause trouble.  Good kidney function on recent blood work.  The radiologist recommends a CT scan for further evaluation.  I think this is reasonable to do.  It has been ordered.

## 2019-12-03 ENCOUNTER — HOSPITAL ENCOUNTER (OUTPATIENT)
Dept: CT IMAGING | Facility: HOSPITAL | Age: 74
Discharge: HOME | End: 2019-12-03
Admitting: FAMILY MEDICINE

## 2019-12-03 DIAGNOSIS — N28.1 BILATERAL RENAL CYSTS: ICD-10-CM

## 2019-12-03 LAB — CREAT BLDA-MCNC: 1.1 MG/DL (ref 0.6–1.3)

## 2019-12-03 PROCEDURE — 82565 ASSAY OF CREATININE: CPT

## 2019-12-03 PROCEDURE — 74177 CT ABD & PELVIS W/CONTRAST: CPT

## 2019-12-03 PROCEDURE — 25010000002 IOPAMIDOL 61 % SOLUTION: Performed by: FAMILY MEDICINE

## 2019-12-03 RX ADMIN — IOPAMIDOL 85 ML: 612 INJECTION, SOLUTION INTRAVENOUS at 09:03

## 2019-12-04 ENCOUNTER — OFFICE VISIT (OUTPATIENT)
Dept: SLEEP MEDICINE | Facility: HOSPITAL | Age: 74
End: 2019-12-04
Attending: INTERNAL MEDICINE

## 2019-12-04 VITALS — HEIGHT: 66 IN | BODY MASS INDEX: 37.77 KG/M2 | WEIGHT: 235 LBS

## 2019-12-04 DIAGNOSIS — G47.14 HYPERSOMNIA DUE TO MEDICAL CONDITION: ICD-10-CM

## 2019-12-04 DIAGNOSIS — Z99.89 OSA ON CPAP: Primary | ICD-10-CM

## 2019-12-04 DIAGNOSIS — G47.33 OSA ON CPAP: Primary | ICD-10-CM

## 2019-12-04 DIAGNOSIS — E66.01 CLASS 2 SEVERE OBESITY DUE TO EXCESS CALORIES WITH SERIOUS COMORBIDITY AND BODY MASS INDEX (BMI) OF 37.0 TO 37.9 IN ADULT (HCC): ICD-10-CM

## 2019-12-04 DIAGNOSIS — IMO0002 SLEEP-RELATED HYPOVENTILATION: ICD-10-CM

## 2019-12-04 PROCEDURE — 99213 OFFICE O/P EST LOW 20 MIN: CPT | Performed by: INTERNAL MEDICINE

## 2019-12-04 PROCEDURE — G0463 HOSPITAL OUTPT CLINIC VISIT: HCPCS

## 2019-12-04 NOTE — PROGRESS NOTES
"[facility] Note     Chief Complaint:  ROMINA     Primary Care Physician: Mynor Russ MD    Interval History:   I last saw the patient around November 2018.  He is stable.  He goes to bed at 11 PM and awakens at 8 AM.  He will use the restroom at nighttime.  Electra Sleepiness Scale borderline abnormal at 8    Review of Systems:    A complete review of systems was done and all were negative with the exception of nasal congestion and postnasal drip    Social History:    Social History     Socioeconomic History   • Marital status:      Spouse name: Not on file   • Number of children: Not on file   • Years of education: Not on file   • Highest education level: Not on file   Tobacco Use   • Smoking status: Former Smoker   • Smokeless tobacco: Never Used   • Tobacco comment: He smoked between the ages of 18 and 32.   Substance and Sexual Activity   • Alcohol use: Yes     Comment: He will have 8-10 drinks per week.   • Drug use: No       Allergies:  Amoxicillin and Levaquin [levofloxacin]     Medication Review: His list was reviewed.      Vital Signs:    Vitals:    12/04/19 0942   Weight: 107 kg (235 lb)   Height: 167.6 cm (66\")     Body mass index is 37.93 kg/m².    Physical Exam:    Constitutional:  Well developed 74 y.o. male that appears in no apparent distress.  Awake & oriented times 3.  Normal mood with normal recent and remote memory and normal judgement.  Eyes:  Conjunctivae normal.  Oropharynx:  moist mucous membranes without exudate and a large tongue and normal uvula and narrowed posterior pharyngeal opening and class II-3 MP airway     Results Review:  DME is [facility] and he uses a full facemask.  Downloads between 9/5 and 12/3/2019 compliance 100%.  Average usage is 8 hours.  Average AHI is normal without leak.  Average AutoCPAP pressure is 11.8 and his auto CPAP is 8-20.       Impression:   Severe obstructive sleep apnea with sleep-related hypoxia adequately treated with auto " CPAP with good compliance and usage and some complaints of hypersomnolence.    Plan:  Good sleep hygiene measures should be maintained.  Weight loss would be beneficial in this patient who is obese by BMI.  The patient is benefiting from the treatment being provided.     The patient will continue auto CPAP and a new prescription will be sent to his DME    The patient will call for any problems and will follow up in 1 year.      Kendrick Diaz MD  Sleep Medicine  12/04/19  9:53 AM

## 2019-12-05 DIAGNOSIS — N28.1 BILATERAL RENAL CYSTS: Primary | ICD-10-CM

## 2019-12-05 NOTE — PROGRESS NOTES
Phone call patient.  We did a AAA screen which was negative but showed some renal cysts.  They recommend a CT scan.  The CT scan was done.  Shows renal cysts.  Some calcifications and possible irregularities.  Also left hydroureter with prostatomegaly.  They are recommending a multiphase repeat CT scan.  I spoke to the patient.  Before we do this I want an opinion from a urologist.  Referral made.  Patient also states his blood pressure is better on the hydrochlorothiazide and is not having any worsening lower urinary tract symptoms.

## 2019-12-05 NOTE — PROGRESS NOTES
Per our conversation, before going to the new CT scan, I recommend a urology referral.  I have placed a referral and you should be hearing from our referral clerk soon.

## 2019-12-15 PROBLEM — E66.812 CLASS 2 SEVERE OBESITY DUE TO EXCESS CALORIES WITH SERIOUS COMORBIDITY AND BODY MASS INDEX (BMI) OF 37.0 TO 37.9 IN ADULT: Status: ACTIVE | Noted: 2019-12-15

## 2019-12-15 PROBLEM — G47.14 HYPERSOMNIA DUE TO MEDICAL CONDITION: Status: ACTIVE | Noted: 2019-12-15

## 2019-12-15 PROBLEM — E66.01 CLASS 2 SEVERE OBESITY DUE TO EXCESS CALORIES WITH SERIOUS COMORBIDITY AND BODY MASS INDEX (BMI) OF 37.0 TO 37.9 IN ADULT: Status: ACTIVE | Noted: 2019-12-15

## 2020-02-12 ENCOUNTER — OFFICE VISIT (OUTPATIENT)
Dept: FAMILY MEDICINE CLINIC | Facility: CLINIC | Age: 75
End: 2020-02-12

## 2020-02-12 VITALS
WEIGHT: 233.6 LBS | HEIGHT: 66 IN | TEMPERATURE: 96.2 F | HEART RATE: 71 BPM | SYSTOLIC BLOOD PRESSURE: 153 MMHG | DIASTOLIC BLOOD PRESSURE: 70 MMHG | OXYGEN SATURATION: 97 % | BODY MASS INDEX: 37.54 KG/M2

## 2020-02-12 DIAGNOSIS — E03.8 SUBCLINICAL HYPOTHYROIDISM: ICD-10-CM

## 2020-02-12 DIAGNOSIS — R39.9 LOWER URINARY TRACT SYMPTOMS (LUTS): ICD-10-CM

## 2020-02-12 DIAGNOSIS — F33.42 RECURRENT MAJOR DEPRESSIVE DISORDER, IN FULL REMISSION (HCC): ICD-10-CM

## 2020-02-12 DIAGNOSIS — I10 ESSENTIAL HYPERTENSION: ICD-10-CM

## 2020-02-12 DIAGNOSIS — E78.01 FAMILIAL HYPERCHOLESTEROLEMIA: Primary | ICD-10-CM

## 2020-02-12 DIAGNOSIS — N28.1 BILATERAL RENAL CYSTS: ICD-10-CM

## 2020-02-12 PROCEDURE — 99214 OFFICE O/P EST MOD 30 MIN: CPT | Performed by: FAMILY MEDICINE

## 2020-02-12 RX ORDER — CARVEDILOL 3.12 MG/1
3.12 TABLET ORAL 2 TIMES DAILY WITH MEALS
Qty: 60 TABLET | Refills: 2 | Status: SHIPPED | OUTPATIENT
Start: 2020-02-12 | End: 2020-05-09 | Stop reason: SDUPTHER

## 2020-02-12 NOTE — PROGRESS NOTES
Subjective   Dick Galindo is a 74 y.o. male.     Hypertension    History of Present Illness    Hypertension follow up. Doing well with current medication which he is taking as directed.  Blood pressure has been running high at home.  About 140 or 150 systolic.  He checks with a new monitor.  He brought in today.  It is calibrated.  He takes hydrochlorothiazide at low dose.  This makes his lower urinary tract symptoms worse.  He is followed by urology for the lower urinary tract symptoms and also the bilateral kidney cysts.  Reportedly Dr. Cameron has looked at the CT and is not concerned about it.  However I do not have records from them.  No cardiovascular or neurological symptoms. Today's BP: 153/70.      Hyperlipidemia follow up. He is taking statin medication without complaint. No myopathy symptoms.     Lab Results   Component Value Date    CHLPL 135 11/05/2019    TRIG 78 11/05/2019    HDL 62 11/05/2019    LDL 57 11/05/2019     Major depression.  In remission.  He continues on sertraline maintenance.  He had stopped the sertraline a couple of years ago and had a flare of his depression symptoms.    Previous history of asthma-like symptoms.  With allergies he will take Ventolin now and then.    Hypothyroidism followup.  Taking levothyroxine as indicated.  No symptoms of high or low thyroid.  Last TSH as follows:    Lab Results   Component Value Date    TSH 3.420 11/05/2019        The following portions of the patient's history were reviewed and updated as appropriate: allergies, current medications, past family history, past medical history, past social history, past surgical history and problem list.      Review of Systems   Constitutional: Negative.    Respiratory: Negative.    Cardiovascular: Negative.    Genitourinary: Positive for difficulty urinating.   Musculoskeletal: Negative.        Objective   Blood pressure 153/70, pulse 71, temperature 96.2 °F (35.7 °C), temperature source Oral, height 167.6 cm  "(65.98\"), weight 106 kg (233 lb 9.6 oz), SpO2 97 %.  Physical Exam   Constitutional: He appears well-developed and well-nourished. No distress.   Neck: No thyromegaly present.   Cardiovascular: Normal rate, regular rhythm, normal heart sounds and intact distal pulses.   Pulmonary/Chest: Effort normal and breath sounds normal.   Musculoskeletal: He exhibits no edema.   Skin: Skin is warm and dry.   Psychiatric: He has a normal mood and affect. His behavior is normal. Judgment and thought content normal.   Nursing note and vitals reviewed.      Assessment/Plan   Dick was seen today for hypertension.    Diagnoses and all orders for this visit:    Familial hypercholesterolemia    Essential hypertension    Bilateral renal cysts    Recurrent major depressive disorder, in full remission (CMS/[facility])    Lower urinary tract symptoms (LUTS)    Subclinical hypothyroidism    Other orders  -     carvedilol (COREG) 3.125 MG tablet; Take 1 tablet by mouth 2 (Two) Times a Day With Meals.    Hyperlipidemia.  He continues atorvastatin.    Hypertension.  Not controlled.  Discontinue hydrochlorothiazide that is exacerbating his lower urinary tract symptoms.  Continue amlodipine at 10 mg a day.  Continue tamsulosin which he is taking twice a day per urology.  Continue lisinopril 40 mg a day.  We are adding carvedilol 3.125 mg twice a day.  If it causes any asthma symptoms patient is going to let us know.  He will be checking his blood pressure regular basis I will see him in 6 weeks.    Bilateral renal cyst.  Recommend repeat CT scan this summer.  Also followed by urology.    Lower urinary tract symptoms.  Followed by urology.  He has some upcoming tests through them.  Including ultrasound.    Major depression.  In remission.  Continue sertraline maintenance.    Hypothyroidism.  TSH was therapeutic last visit.           "

## 2020-05-11 RX ORDER — CARVEDILOL 3.12 MG/1
3.12 TABLET ORAL 2 TIMES DAILY WITH MEALS
Qty: 180 TABLET | Refills: 1 | Status: SHIPPED | OUTPATIENT
Start: 2020-05-11 | End: 2020-05-27

## 2020-05-11 RX ORDER — LEVOTHYROXINE SODIUM 0.05 MG/1
50 TABLET ORAL DAILY
Qty: 90 TABLET | Refills: 1 | Status: SHIPPED | OUTPATIENT
Start: 2020-05-11 | End: 2020-10-19 | Stop reason: SDUPTHER

## 2020-05-11 RX ORDER — ATORVASTATIN CALCIUM 40 MG/1
40 TABLET, FILM COATED ORAL DAILY
Qty: 90 TABLET | Refills: 1 | Status: SHIPPED | OUTPATIENT
Start: 2020-05-11 | End: 2020-10-19 | Stop reason: SDUPTHER

## 2020-05-11 RX ORDER — AMLODIPINE BESYLATE 10 MG/1
10 TABLET ORAL DAILY
Qty: 90 TABLET | Refills: 1 | Status: SHIPPED | OUTPATIENT
Start: 2020-05-11 | End: 2020-10-19 | Stop reason: SDUPTHER

## 2020-05-11 RX ORDER — LISINOPRIL 40 MG/1
40 TABLET ORAL DAILY
Qty: 90 TABLET | Refills: 1 | Status: SHIPPED | OUTPATIENT
Start: 2020-05-11 | End: 2020-10-19 | Stop reason: SDUPTHER

## 2020-05-27 ENCOUNTER — TELEMEDICINE (OUTPATIENT)
Dept: FAMILY MEDICINE CLINIC | Facility: CLINIC | Age: 75
End: 2020-05-27

## 2020-05-27 DIAGNOSIS — J01.90 ACUTE NON-RECURRENT SINUSITIS, UNSPECIFIED LOCATION: ICD-10-CM

## 2020-05-27 DIAGNOSIS — I10 ESSENTIAL HYPERTENSION: Primary | ICD-10-CM

## 2020-05-27 DIAGNOSIS — J20.9 BRONCHOSPASM WITH BRONCHITIS, ACUTE: ICD-10-CM

## 2020-05-27 PROCEDURE — 99213 OFFICE O/P EST LOW 20 MIN: CPT | Performed by: FAMILY MEDICINE

## 2020-05-27 RX ORDER — TAMSULOSIN HYDROCHLORIDE 0.4 MG/1
1 CAPSULE ORAL 2 TIMES DAILY
Qty: 180 CAPSULE | Refills: 1 | Status: ON HOLD
Start: 2020-05-27 | End: 2020-07-06

## 2020-05-27 RX ORDER — FLUTICASONE PROPIONATE 50 MCG
2 SPRAY, SUSPENSION (ML) NASAL DAILY
Qty: 1 BOTTLE | Refills: 5 | Status: SHIPPED | OUTPATIENT
Start: 2020-05-27 | End: 2020-06-29

## 2020-05-27 RX ORDER — CARVEDILOL 6.25 MG/1
6.25 TABLET ORAL 2 TIMES DAILY WITH MEALS
Qty: 180 TABLET | Refills: 1 | Status: SHIPPED | OUTPATIENT
Start: 2020-05-27 | End: 2020-10-19 | Stop reason: SDUPTHER

## 2020-05-27 RX ORDER — ALBUTEROL SULFATE 90 UG/1
AEROSOL, METERED RESPIRATORY (INHALATION)
Qty: 18 G | Refills: 3 | Status: SHIPPED | OUTPATIENT
Start: 2020-05-27 | End: 2021-07-18 | Stop reason: SDUPTHER

## 2020-06-19 ENCOUNTER — TELEPHONE (OUTPATIENT)
Dept: FAMILY MEDICINE CLINIC | Facility: CLINIC | Age: 75
End: 2020-06-19

## 2020-06-19 RX ORDER — CEPHALEXIN 500 MG/1
500 CAPSULE ORAL 3 TIMES DAILY
Qty: 15 CAPSULE | Refills: 0 | Status: SHIPPED | OUTPATIENT
Start: 2020-06-19 | End: 2020-06-29

## 2020-06-19 NOTE — TELEPHONE ENCOUNTER
PATIENT CALLED TO FOLLOW UP ON [employer] MESSAGE SENT YESTERDAY.    BEST CALL BACK NUMBER:   [phone]    PLEASE ADVISE.

## 2020-06-25 ENCOUNTER — TRANSCRIBE ORDERS (OUTPATIENT)
Dept: PREADMISSION TESTING | Facility: HOSPITAL | Age: 75
End: 2020-06-25

## 2020-06-25 DIAGNOSIS — Z01.818 OTHER SPECIFIED PRE-OPERATIVE EXAMINATION: Primary | ICD-10-CM

## 2020-06-29 ENCOUNTER — APPOINTMENT (OUTPATIENT)
Dept: PREADMISSION TESTING | Facility: HOSPITAL | Age: 75
End: 2020-06-29

## 2020-06-29 VITALS
BODY MASS INDEX: 38.09 KG/M2 | RESPIRATION RATE: 18 BRPM | OXYGEN SATURATION: 99 % | SYSTOLIC BLOOD PRESSURE: 135 MMHG | TEMPERATURE: 98 F | DIASTOLIC BLOOD PRESSURE: 77 MMHG | HEIGHT: 66 IN | WEIGHT: 237 LBS | HEART RATE: 69 BPM

## 2020-06-29 LAB
ANION GAP SERPL CALCULATED.3IONS-SCNC: 8.7 MMOL/L (ref 5–15)
BUN BLD-MCNC: 18 MG/DL (ref 8–23)
BUN/CREAT SERPL: 13.4 (ref 7–25)
CALCIUM SPEC-SCNC: 9.2 MG/DL (ref 8.6–10.5)
CHLORIDE SERPL-SCNC: 106 MMOL/L (ref 98–107)
CO2 SERPL-SCNC: 27.3 MMOL/L (ref 22–29)
CREAT BLD-MCNC: 1.34 MG/DL (ref 0.76–1.27)
DEPRECATED RDW RBC AUTO: 39.2 FL (ref 37–54)
ERYTHROCYTE [DISTWIDTH] IN BLOOD BY AUTOMATED COUNT: 12.9 % (ref 12.3–15.4)
GFR SERPL CREATININE-BSD FRML MDRD: 52 ML/MIN/1.73
GLUCOSE BLD-MCNC: 95 MG/DL (ref 65–99)
HCT VFR BLD AUTO: 30.9 % (ref 37.5–51)
HGB BLD-MCNC: 9.7 G/DL (ref 13–17.7)
MCH RBC QN AUTO: 26.3 PG (ref 26.6–33)
MCHC RBC AUTO-ENTMCNC: 31.4 G/DL (ref 31.5–35.7)
MCV RBC AUTO: 83.7 FL (ref 79–97)
PLATELET # BLD AUTO: 246 10*3/MM3 (ref 140–450)
PMV BLD AUTO: 9.1 FL (ref 6–12)
POTASSIUM BLD-SCNC: 4.4 MMOL/L (ref 3.5–5.2)
RBC # BLD AUTO: 3.69 10*6/MM3 (ref 4.14–5.8)
SODIUM BLD-SCNC: 142 MMOL/L (ref 136–145)
WBC NRBC COR # BLD: 6.34 10*3/MM3 (ref 3.4–10.8)

## 2020-06-29 PROCEDURE — 85027 COMPLETE CBC AUTOMATED: CPT | Performed by: UROLOGY

## 2020-06-29 PROCEDURE — 93005 ELECTROCARDIOGRAM TRACING: CPT

## 2020-06-29 PROCEDURE — 36415 COLL VENOUS BLD VENIPUNCTURE: CPT

## 2020-06-29 PROCEDURE — 80048 BASIC METABOLIC PNL TOTAL CA: CPT | Performed by: UROLOGY

## 2020-06-29 PROCEDURE — 93010 ELECTROCARDIOGRAM REPORT: CPT | Performed by: INTERNAL MEDICINE

## 2020-07-03 ENCOUNTER — LAB (OUTPATIENT)
Dept: LAB | Facility: HOSPITAL | Age: 75
End: 2020-07-03

## 2020-07-03 DIAGNOSIS — Z01.818 OTHER SPECIFIED PRE-OPERATIVE EXAMINATION: ICD-10-CM

## 2020-07-03 PROCEDURE — C9803 HOPD COVID-19 SPEC COLLECT: HCPCS

## 2020-07-03 PROCEDURE — U0004 COV-19 TEST NON-CDC HGH THRU: HCPCS

## 2020-07-04 LAB
REF LAB TEST METHOD: NORMAL
SARS-COV-2 RNA RESP QL NAA+PROBE: NOT DETECTED

## 2020-07-06 ENCOUNTER — HOSPITAL ENCOUNTER (OUTPATIENT)
Facility: HOSPITAL | Age: 75
Discharge: HOME | End: 2020-07-07
Attending: UROLOGY | Admitting: UROLOGY

## 2020-07-06 ENCOUNTER — ANESTHESIA (OUTPATIENT)
Dept: PERIOP | Facility: HOSPITAL | Age: 75
End: 2020-07-06

## 2020-07-06 ENCOUNTER — ANESTHESIA EVENT (OUTPATIENT)
Dept: PERIOP | Facility: HOSPITAL | Age: 75
End: 2020-07-06

## 2020-07-06 DIAGNOSIS — N40.1 BPH WITH OBSTRUCTION/LOWER URINARY TRACT SYMPTOMS: Primary | ICD-10-CM

## 2020-07-06 DIAGNOSIS — N40.0 PROSTATE HYPERTROPHY: ICD-10-CM

## 2020-07-06 DIAGNOSIS — N13.8 BPH WITH OBSTRUCTION/LOWER URINARY TRACT SYMPTOMS: Primary | ICD-10-CM

## 2020-07-06 PROCEDURE — 25010000003 CEFAZOLIN IN DEXTROSE 2-4 GM/100ML-% SOLUTION: Performed by: UROLOGY

## 2020-07-06 PROCEDURE — 25010000002 NEOSTIGMINE PER 0.5 MG: Performed by: NURSE ANESTHETIST, CERTIFIED REGISTERED

## 2020-07-06 PROCEDURE — 25010000002 MIDAZOLAM PER 1 MG: Performed by: ANESTHESIOLOGY

## 2020-07-06 PROCEDURE — 88305 TISSUE EXAM BY PATHOLOGIST: CPT | Performed by: UROLOGY

## 2020-07-06 PROCEDURE — 25010000002 DEXAMETHASONE PER 1 MG: Performed by: NURSE ANESTHETIST, CERTIFIED REGISTERED

## 2020-07-06 PROCEDURE — 25010000002 ONDANSETRON PER 1 MG: Performed by: NURSE ANESTHETIST, CERTIFIED REGISTERED

## 2020-07-06 PROCEDURE — G0378 HOSPITAL OBSERVATION PER HR: HCPCS

## 2020-07-06 PROCEDURE — 25010000002 PROPOFOL 10 MG/ML EMULSION: Performed by: NURSE ANESTHETIST, CERTIFIED REGISTERED

## 2020-07-06 PROCEDURE — 25010000002 FENTANYL CITRATE (PF) 100 MCG/2ML SOLUTION: Performed by: NURSE ANESTHETIST, CERTIFIED REGISTERED

## 2020-07-06 RX ORDER — NALOXONE HCL 0.4 MG/ML
0.1 VIAL (ML) INJECTION
Status: DISCONTINUED | OUTPATIENT
Start: 2020-07-06 | End: 2020-07-07 | Stop reason: HOSPADM

## 2020-07-06 RX ORDER — PROMETHAZINE HYDROCHLORIDE 25 MG/1
25 TABLET ORAL ONCE AS NEEDED
Status: DISCONTINUED | OUTPATIENT
Start: 2020-07-06 | End: 2020-07-06 | Stop reason: HOSPADM

## 2020-07-06 RX ORDER — LIDOCAINE HYDROCHLORIDE 10 MG/ML
0.5 INJECTION, SOLUTION EPIDURAL; INFILTRATION; INTRACAUDAL; PERINEURAL ONCE AS NEEDED
Status: DISCONTINUED | OUTPATIENT
Start: 2020-07-06 | End: 2020-07-06 | Stop reason: HOSPADM

## 2020-07-06 RX ORDER — EPHEDRINE SULFATE 50 MG/ML
5 INJECTION, SOLUTION INTRAVENOUS ONCE AS NEEDED
Status: DISCONTINUED | OUTPATIENT
Start: 2020-07-06 | End: 2020-07-06 | Stop reason: HOSPADM

## 2020-07-06 RX ORDER — ATROPA BELLADONNA AND OPIUM 16.2; 6 MG/1; MG/1
SUPPOSITORY RECTAL AS NEEDED
Status: DISCONTINUED | OUTPATIENT
Start: 2020-07-06 | End: 2020-07-06 | Stop reason: HOSPADM

## 2020-07-06 RX ORDER — ATORVASTATIN CALCIUM 20 MG/1
40 TABLET, FILM COATED ORAL DAILY
Status: DISCONTINUED | OUTPATIENT
Start: 2020-07-06 | End: 2020-07-07 | Stop reason: HOSPADM

## 2020-07-06 RX ORDER — HYDROCODONE BITARTRATE AND ACETAMINOPHEN 7.5; 325 MG/1; MG/1
1 TABLET ORAL ONCE AS NEEDED
Status: DISCONTINUED | OUTPATIENT
Start: 2020-07-06 | End: 2020-07-06 | Stop reason: HOSPADM

## 2020-07-06 RX ORDER — CARVEDILOL 6.25 MG/1
6.25 TABLET ORAL 2 TIMES DAILY WITH MEALS
Status: DISCONTINUED | OUTPATIENT
Start: 2020-07-06 | End: 2020-07-07 | Stop reason: HOSPADM

## 2020-07-06 RX ORDER — ALBUTEROL SULFATE 2.5 MG/3ML
2.5 SOLUTION RESPIRATORY (INHALATION) EVERY 6 HOURS PRN
Status: DISCONTINUED | OUTPATIENT
Start: 2020-07-06 | End: 2020-07-07 | Stop reason: HOSPADM

## 2020-07-06 RX ORDER — DEXAMETHASONE SODIUM PHOSPHATE 10 MG/ML
INJECTION INTRAMUSCULAR; INTRAVENOUS AS NEEDED
Status: DISCONTINUED | OUTPATIENT
Start: 2020-07-06 | End: 2020-07-06 | Stop reason: SURG

## 2020-07-06 RX ORDER — PROMETHAZINE HYDROCHLORIDE 25 MG/ML
6.25 INJECTION, SOLUTION INTRAMUSCULAR; INTRAVENOUS
Status: DISCONTINUED | OUTPATIENT
Start: 2020-07-06 | End: 2020-07-06 | Stop reason: HOSPADM

## 2020-07-06 RX ORDER — SODIUM CHLORIDE 9 MG/ML
100 INJECTION, SOLUTION INTRAVENOUS CONTINUOUS
Status: DISCONTINUED | OUTPATIENT
Start: 2020-07-06 | End: 2020-07-07 | Stop reason: HOSPADM

## 2020-07-06 RX ORDER — LEVOTHYROXINE SODIUM 0.05 MG/1
50 TABLET ORAL DAILY
Status: DISCONTINUED | OUTPATIENT
Start: 2020-07-06 | End: 2020-07-07 | Stop reason: HOSPADM

## 2020-07-06 RX ORDER — HYDROMORPHONE HYDROCHLORIDE 1 MG/ML
0.5 INJECTION, SOLUTION INTRAMUSCULAR; INTRAVENOUS; SUBCUTANEOUS
Status: DISCONTINUED | OUTPATIENT
Start: 2020-07-06 | End: 2020-07-06 | Stop reason: HOSPADM

## 2020-07-06 RX ORDER — ACETAMINOPHEN 325 MG/1
650 TABLET ORAL EVERY 6 HOURS PRN
Status: DISCONTINUED | OUTPATIENT
Start: 2020-07-06 | End: 2020-07-07 | Stop reason: HOSPADM

## 2020-07-06 RX ORDER — EPHEDRINE SULFATE 50 MG/ML
INJECTION, SOLUTION INTRAVENOUS AS NEEDED
Status: DISCONTINUED | OUTPATIENT
Start: 2020-07-06 | End: 2020-07-06 | Stop reason: SURG

## 2020-07-06 RX ORDER — ROCURONIUM BROMIDE 10 MG/ML
INJECTION, SOLUTION INTRAVENOUS AS NEEDED
Status: DISCONTINUED | OUTPATIENT
Start: 2020-07-06 | End: 2020-07-06 | Stop reason: SURG

## 2020-07-06 RX ORDER — NALOXONE HCL 0.4 MG/ML
0.2 VIAL (ML) INJECTION AS NEEDED
Status: DISCONTINUED | OUTPATIENT
Start: 2020-07-06 | End: 2020-07-06 | Stop reason: HOSPADM

## 2020-07-06 RX ORDER — PROMETHAZINE HYDROCHLORIDE 25 MG/1
25 SUPPOSITORY RECTAL ONCE AS NEEDED
Status: DISCONTINUED | OUTPATIENT
Start: 2020-07-06 | End: 2020-07-06 | Stop reason: HOSPADM

## 2020-07-06 RX ORDER — SODIUM CHLORIDE, SODIUM LACTATE, POTASSIUM CHLORIDE, CALCIUM CHLORIDE 600; 310; 30; 20 MG/100ML; MG/100ML; MG/100ML; MG/100ML
9 INJECTION, SOLUTION INTRAVENOUS CONTINUOUS
Status: DISCONTINUED | OUTPATIENT
Start: 2020-07-06 | End: 2020-07-06

## 2020-07-06 RX ORDER — GLYCOPYRROLATE 0.2 MG/ML
INJECTION INTRAMUSCULAR; INTRAVENOUS AS NEEDED
Status: DISCONTINUED | OUTPATIENT
Start: 2020-07-06 | End: 2020-07-06 | Stop reason: SURG

## 2020-07-06 RX ORDER — SODIUM CHLORIDE 0.9 % (FLUSH) 0.9 %
3-10 SYRINGE (ML) INJECTION AS NEEDED
Status: DISCONTINUED | OUTPATIENT
Start: 2020-07-06 | End: 2020-07-06 | Stop reason: HOSPADM

## 2020-07-06 RX ORDER — ONDANSETRON 2 MG/ML
4 INJECTION INTRAMUSCULAR; INTRAVENOUS ONCE AS NEEDED
Status: DISCONTINUED | OUTPATIENT
Start: 2020-07-06 | End: 2020-07-06 | Stop reason: HOSPADM

## 2020-07-06 RX ORDER — LISINOPRIL 40 MG/1
40 TABLET ORAL DAILY
Status: DISCONTINUED | OUTPATIENT
Start: 2020-07-06 | End: 2020-07-07 | Stop reason: HOSPADM

## 2020-07-06 RX ORDER — ACETAMINOPHEN 325 MG/1
650 TABLET ORAL ONCE AS NEEDED
Status: DISCONTINUED | OUTPATIENT
Start: 2020-07-06 | End: 2020-07-06 | Stop reason: HOSPADM

## 2020-07-06 RX ORDER — ONDANSETRON 2 MG/ML
INJECTION INTRAMUSCULAR; INTRAVENOUS AS NEEDED
Status: DISCONTINUED | OUTPATIENT
Start: 2020-07-06 | End: 2020-07-06 | Stop reason: SURG

## 2020-07-06 RX ORDER — ONDANSETRON 4 MG/1
4 TABLET, FILM COATED ORAL EVERY 6 HOURS PRN
Status: DISCONTINUED | OUTPATIENT
Start: 2020-07-06 | End: 2020-07-07 | Stop reason: HOSPADM

## 2020-07-06 RX ORDER — MAGNESIUM HYDROXIDE 1200 MG/15ML
LIQUID ORAL AS NEEDED
Status: DISCONTINUED | OUTPATIENT
Start: 2020-07-06 | End: 2020-07-06 | Stop reason: HOSPADM

## 2020-07-06 RX ORDER — HYDRALAZINE HYDROCHLORIDE 20 MG/ML
5 INJECTION INTRAMUSCULAR; INTRAVENOUS
Status: DISCONTINUED | OUTPATIENT
Start: 2020-07-06 | End: 2020-07-06 | Stop reason: HOSPADM

## 2020-07-06 RX ORDER — CEFAZOLIN SODIUM 2 G/100ML
2 INJECTION, SOLUTION INTRAVENOUS EVERY 8 HOURS
Status: COMPLETED | OUTPATIENT
Start: 2020-07-06 | End: 2020-07-07

## 2020-07-06 RX ORDER — FAMOTIDINE 10 MG/ML
20 INJECTION, SOLUTION INTRAVENOUS ONCE
Status: COMPLETED | OUTPATIENT
Start: 2020-07-06 | End: 2020-07-06

## 2020-07-06 RX ORDER — AMLODIPINE BESYLATE 10 MG/1
10 TABLET ORAL DAILY
Status: DISCONTINUED | OUTPATIENT
Start: 2020-07-06 | End: 2020-07-07 | Stop reason: HOSPADM

## 2020-07-06 RX ORDER — DIPHENHYDRAMINE HCL 25 MG
25 CAPSULE ORAL
Status: DISCONTINUED | OUTPATIENT
Start: 2020-07-06 | End: 2020-07-06 | Stop reason: HOSPADM

## 2020-07-06 RX ORDER — FENTANYL CITRATE 50 UG/ML
50 INJECTION, SOLUTION INTRAMUSCULAR; INTRAVENOUS
Status: DISCONTINUED | OUTPATIENT
Start: 2020-07-06 | End: 2020-07-06 | Stop reason: HOSPADM

## 2020-07-06 RX ORDER — AMOXICILLIN 250 MG
2 CAPSULE ORAL NIGHTLY
Status: DISCONTINUED | OUTPATIENT
Start: 2020-07-06 | End: 2020-07-07 | Stop reason: HOSPADM

## 2020-07-06 RX ORDER — FLUMAZENIL 0.1 MG/ML
0.2 INJECTION INTRAVENOUS AS NEEDED
Status: DISCONTINUED | OUTPATIENT
Start: 2020-07-06 | End: 2020-07-06 | Stop reason: HOSPADM

## 2020-07-06 RX ORDER — PROMETHAZINE HYDROCHLORIDE 25 MG/ML
12.5 INJECTION, SOLUTION INTRAMUSCULAR; INTRAVENOUS ONCE AS NEEDED
Status: DISCONTINUED | OUTPATIENT
Start: 2020-07-06 | End: 2020-07-06 | Stop reason: HOSPADM

## 2020-07-06 RX ORDER — HYDROCODONE BITARTRATE AND ACETAMINOPHEN 5; 325 MG/1; MG/1
1 TABLET ORAL EVERY 4 HOURS PRN
Status: DISCONTINUED | OUTPATIENT
Start: 2020-07-06 | End: 2020-07-07 | Stop reason: HOSPADM

## 2020-07-06 RX ORDER — LEVOFLOXACIN 5 MG/ML
500 INJECTION, SOLUTION INTRAVENOUS ONCE
Status: DISCONTINUED | OUTPATIENT
Start: 2020-07-06 | End: 2020-07-06

## 2020-07-06 RX ORDER — LABETALOL HYDROCHLORIDE 5 MG/ML
5 INJECTION, SOLUTION INTRAVENOUS
Status: DISCONTINUED | OUTPATIENT
Start: 2020-07-06 | End: 2020-07-06 | Stop reason: HOSPADM

## 2020-07-06 RX ORDER — ONDANSETRON 2 MG/ML
4 INJECTION INTRAMUSCULAR; INTRAVENOUS EVERY 6 HOURS PRN
Status: DISCONTINUED | OUTPATIENT
Start: 2020-07-06 | End: 2020-07-07 | Stop reason: HOSPADM

## 2020-07-06 RX ORDER — HYDROMORPHONE HYDROCHLORIDE 1 MG/ML
0.5 INJECTION, SOLUTION INTRAMUSCULAR; INTRAVENOUS; SUBCUTANEOUS
Status: DISCONTINUED | OUTPATIENT
Start: 2020-07-06 | End: 2020-07-07 | Stop reason: HOSPADM

## 2020-07-06 RX ORDER — PROPOFOL 10 MG/ML
VIAL (ML) INTRAVENOUS AS NEEDED
Status: DISCONTINUED | OUTPATIENT
Start: 2020-07-06 | End: 2020-07-06 | Stop reason: SURG

## 2020-07-06 RX ORDER — ATROPA BELLADONNA AND OPIUM 16.2; 6 MG/1; MG/1
SUPPOSITORY RECTAL
Status: COMPLETED
Start: 2020-07-06 | End: 2020-07-06

## 2020-07-06 RX ORDER — SODIUM CHLORIDE 0.9 % (FLUSH) 0.9 %
3 SYRINGE (ML) INJECTION EVERY 12 HOURS SCHEDULED
Status: DISCONTINUED | OUTPATIENT
Start: 2020-07-06 | End: 2020-07-06 | Stop reason: HOSPADM

## 2020-07-06 RX ORDER — MIDAZOLAM HYDROCHLORIDE 1 MG/ML
1 INJECTION INTRAMUSCULAR; INTRAVENOUS
Status: DISCONTINUED | OUTPATIENT
Start: 2020-07-06 | End: 2020-07-06 | Stop reason: HOSPADM

## 2020-07-06 RX ORDER — LIDOCAINE HYDROCHLORIDE 20 MG/ML
INJECTION, SOLUTION INFILTRATION; PERINEURAL AS NEEDED
Status: DISCONTINUED | OUTPATIENT
Start: 2020-07-06 | End: 2020-07-06 | Stop reason: SURG

## 2020-07-06 RX ORDER — FENTANYL CITRATE 50 UG/ML
INJECTION, SOLUTION INTRAMUSCULAR; INTRAVENOUS AS NEEDED
Status: DISCONTINUED | OUTPATIENT
Start: 2020-07-06 | End: 2020-07-06 | Stop reason: SURG

## 2020-07-06 RX ORDER — DIPHENHYDRAMINE HYDROCHLORIDE 50 MG/ML
12.5 INJECTION INTRAMUSCULAR; INTRAVENOUS
Status: DISCONTINUED | OUTPATIENT
Start: 2020-07-06 | End: 2020-07-06 | Stop reason: HOSPADM

## 2020-07-06 RX ORDER — CEFAZOLIN SODIUM 2 G/100ML
2 INJECTION, SOLUTION INTRAVENOUS ONCE
Status: COMPLETED | OUTPATIENT
Start: 2020-07-06 | End: 2020-07-06

## 2020-07-06 RX ORDER — OXYCODONE AND ACETAMINOPHEN 7.5; 325 MG/1; MG/1
1 TABLET ORAL ONCE AS NEEDED
Status: DISCONTINUED | OUTPATIENT
Start: 2020-07-06 | End: 2020-07-06 | Stop reason: HOSPADM

## 2020-07-06 RX ADMIN — ATROPA BELLADONNA AND OPIUM: 16.2; 6 SUPPOSITORY RECTAL at 11:13

## 2020-07-06 RX ADMIN — EPHEDRINE SULFATE 10 MG: 50 INJECTION INTRAVENOUS at 08:21

## 2020-07-06 RX ADMIN — ROCURONIUM BROMIDE 40 MG: 10 INJECTION INTRAVENOUS at 07:53

## 2020-07-06 RX ADMIN — FENTANYL CITRATE 50 MCG: 50 INJECTION INTRAMUSCULAR; INTRAVENOUS at 07:53

## 2020-07-06 RX ADMIN — FENTANYL CITRATE 50 MCG: 50 INJECTION INTRAMUSCULAR; INTRAVENOUS at 08:55

## 2020-07-06 RX ADMIN — PROPOFOL 200 MG: 10 INJECTION, EMULSION INTRAVENOUS at 07:53

## 2020-07-06 RX ADMIN — FAMOTIDINE 20 MG: 10 INJECTION INTRAVENOUS at 07:29

## 2020-07-06 RX ADMIN — LIDOCAINE HYDROCHLORIDE 10 MG: 20 INJECTION, SOLUTION INFILTRATION; PERINEURAL at 07:53

## 2020-07-06 RX ADMIN — DEXAMETHASONE SODIUM PHOSPHATE 8 MG: 10 INJECTION INTRAMUSCULAR; INTRAVENOUS at 07:58

## 2020-07-06 RX ADMIN — CEFAZOLIN SODIUM 2 G: 2 INJECTION, SOLUTION INTRAVENOUS at 23:21

## 2020-07-06 RX ADMIN — EPHEDRINE SULFATE 10 MG: 50 INJECTION INTRAVENOUS at 09:01

## 2020-07-06 RX ADMIN — ACETAMINOPHEN 650 MG: 325 TABLET, FILM COATED ORAL at 17:55

## 2020-07-06 RX ADMIN — EPHEDRINE SULFATE 10 MG: 50 INJECTION INTRAVENOUS at 08:35

## 2020-07-06 RX ADMIN — HYDROCODONE BITARTRATE AND ACETAMINOPHEN 1 TABLET: 5; 325 TABLET ORAL at 22:33

## 2020-07-06 RX ADMIN — SODIUM CHLORIDE 100 ML/HR: 9 INJECTION, SOLUTION INTRAVENOUS at 22:33

## 2020-07-06 RX ADMIN — EPHEDRINE SULFATE 10 MG: 50 INJECTION INTRAVENOUS at 08:45

## 2020-07-06 RX ADMIN — SODIUM CHLORIDE, POTASSIUM CHLORIDE, SODIUM LACTATE AND CALCIUM CHLORIDE 9 ML/HR: 600; 310; 30; 20 INJECTION, SOLUTION INTRAVENOUS at 07:29

## 2020-07-06 RX ADMIN — ONDANSETRON HYDROCHLORIDE 4 MG: 2 SOLUTION INTRAMUSCULAR; INTRAVENOUS at 08:44

## 2020-07-06 RX ADMIN — CARVEDILOL 6.25 MG: 6.25 TABLET, FILM COATED ORAL at 13:58

## 2020-07-06 RX ADMIN — SUGAMMADEX 200 MG: 100 INJECTION, SOLUTION INTRAVENOUS at 08:56

## 2020-07-06 RX ADMIN — ATORVASTATIN CALCIUM 40 MG: 20 TABLET, FILM COATED ORAL at 13:57

## 2020-07-06 RX ADMIN — GLYCOPYRROLATE 0.4 MG: 0.2 INJECTION INTRAMUSCULAR; INTRAVENOUS at 08:52

## 2020-07-06 RX ADMIN — MIDAZOLAM 1 MG: 1 INJECTION INTRAMUSCULAR; INTRAVENOUS at 07:29

## 2020-07-06 RX ADMIN — SODIUM CHLORIDE 100 ML/HR: 9 INJECTION, SOLUTION INTRAVENOUS at 10:51

## 2020-07-06 RX ADMIN — NEOSTIGMINE METHYLSULFATE 3 MG: 1 INJECTION INTRAMUSCULAR; INTRAVENOUS; SUBCUTANEOUS at 08:52

## 2020-07-06 RX ADMIN — CEFAZOLIN SODIUM 2 G: 2 INJECTION, SOLUTION INTRAVENOUS at 07:48

## 2020-07-06 RX ADMIN — EPHEDRINE SULFATE 10 MG: 50 INJECTION INTRAVENOUS at 08:58

## 2020-07-06 RX ADMIN — CEFAZOLIN SODIUM 2 G: 2 INJECTION, SOLUTION INTRAVENOUS at 14:48

## 2020-07-06 NOTE — ANESTHESIA POSTPROCEDURE EVALUATION
Patient: Dick Galindo    Procedure Summary     Date:  07/06/20 Room / Location:  [facility] OR 01 / [facility] MAIN OR    Anesthesia Start:  0746 Anesthesia Stop:  0915    Procedure:  TRANSURETHRAL RESECTION OF PROSTATE (N/A ) Diagnosis:       BPH with obstruction/lower urinary tract symptoms      (BPH with LUTS)    Surgeon:  Charanjit Cameron MD Provider:  Charanjit Kirkland MD    Anesthesia Type:  general ASA Status:  3          Anesthesia Type: general    Vitals  Vitals Value Taken Time   /67 7/6/2020  9:35 AM   Temp 36.7 °C (98 °F) 7/6/2020  9:11 AM   Pulse 61 7/6/2020  9:46 AM   Resp 18 7/6/2020  9:35 AM   SpO2 97 % 7/6/2020  9:46 AM   Vitals shown include unvalidated device data.        Post Anesthesia Care and Evaluation    Patient location during evaluation: PACU  Patient participation: complete - patient participated  Level of consciousness: awake and alert  Pain management: adequate  Airway patency: patent  Anesthetic complications: No anesthetic complications    Cardiovascular status: acceptable  Respiratory status: acceptable  Hydration status: acceptable    Comments: --------------------            07/06/20               0935     --------------------   BP:       115/67     Pulse:      66       Resp:       18       Temp:                SpO2:      95%      --------------------

## 2020-07-06 NOTE — ANESTHESIA PROCEDURE NOTES
Airway  Urgency: elective    Date/Time: 7/6/2020 7:57 AM  Airway not difficult (CMAC used due to large neck size)    General Information and Staff    Patient location during procedure: OR  Anesthesiologist: Charanjit Kirkland MD  CRNA: Marycarmen Tobin CRNA    Indications and Patient Condition  Indications for airway management: airway protection    Preoxygenated: yes  MILS not maintained throughout  Mask difficulty assessment: 2 - vent by mask + OA or adjuvant +/- NMBA    Final Airway Details  Final airway type: endotracheal airway      Successful airway: ETT  Cuffed: yes   Successful intubation technique: video laryngoscopy  Facilitating devices/methods: intubating stylet  Endotracheal tube insertion site: oral  Blade: CMAC  Blade size: D  ETT size (mm): 7.5  Cormack-Lehane Classification: grade I - full view of glottis (with CMAC)  Placement verified by: chest auscultation and capnometry   Measured from: lips  ETT/EBT  to lips (cm): 23  Number of attempts at approach: 1  Assessment: lips, teeth, and gum same as pre-op and atraumatic intubation

## 2020-07-06 NOTE — H&P
"     [facility] Surgical History and Physical    Patient Care Team:  Mynor Russ MD as PCP - General (Family Medicine)    Chief complaint difficulty voiding    Subjective     Patient is a 75 y.o. male presents with severe trilobar bph with intractable voiding symptoms not responding to medications. No hematuria.     Review of Systems   Pertinent items are noted in HPI, all other systems reviewed and negative    Past Medical History:   Diagnosis Date   • Anxiety    • Asthma    • Colon polyps    • Disease of thyroid gland    • Enlarged prostate    • High cholesterol    • History of anemia    • Hypertension    • Sleep apnea     CPAP   • Slow urinary stream      Past Surgical History:   Procedure Laterality Date   • COLONOSCOPY  approx 2012    • COLONOSCOPY N/A 2/6/2018    Procedure: COLONOSCOPY INTO CEECUM AND TERMINAL MILEUM WITH COLD BIOPSY POLYPECTOMIES & COLD BIOIPSIES;  Surgeon: Usman Roper MD;  Location: [facility] ENDOSCOPY;  Service:    • ENDOSCOPY N/A 2/6/2018    Procedure: ESOPHAGOGASTRODUODENOSCOPY WITH COLD BIOPSIES;  Surgeon: Usman Roper MD;  Location: [facility] ENDOSCOPY;  Service:    • TONSILLECTOMY       Family History   Problem Relation Age of Onset   • Stroke Father    • Heart attack Father    • No Known Problems Maternal Grandmother    • No Known Problems Maternal Grandfather    • No Known Problems Paternal Grandmother    • No Known Problems Paternal Grandfather    • Malig Hyperthermia Neg Hx      Social History     Tobacco Use   • Smoking status: Former Smoker   • Smokeless tobacco: Never Used   • Tobacco comment: He smoked between the ages of 18 and 32.   Substance Use Topics   • Alcohol use: Yes     Alcohol/week: 1.0 standard drinks     Types: 1 Glasses of wine per week     Comment: \"not heavy\"   • Drug use: No       Meds:  Medications Prior to Admission   Medication Sig Dispense Refill Last Dose   • albuterol sulfate HFA (Ventolin HFA) 108 (90 Base) MCG/ACT inhaler 2 puffs Q4H for cough and " "wheeze (Patient taking differently: Inhale 2 puffs Every 4 (Four) Hours As Needed. 2 puffs Q4H for cough and wheeze) 18 g 3 7/6/2020 at 0530   • amLODIPine (NORVASC) 10 MG tablet Take 1 tablet by mouth Daily. 90 tablet 1 7/5/2020 at 1000   • atorvastatin (LIPITOR) 40 MG tablet Take 1 tablet by mouth Daily. 90 tablet 1 7/5/2020 at 1000   • carvedilol (COREG) 6.25 MG tablet Take 1 tablet by mouth 2 (Two) Times a Day With Meals. 180 tablet 1 7/5/2020 at 2200   • levothyroxine (SYNTHROID, LEVOTHROID) 50 MCG tablet Take 1 tablet by mouth Daily. 90 tablet 1 7/5/2020 at 1000   • lisinopril (PRINIVIL,ZESTRIL) 40 MG tablet Take 1 tablet by mouth Daily. 90 tablet 1 7/5/2020 at 1000   • sertraline (ZOLOFT) 50 MG tablet Take 1 tablet by mouth Daily. 90 tablet 1 7/5/2020 at 1000   • tamsulosin (FLOMAX) 0.4 MG capsule 24 hr capsule Take 1 capsule by mouth 2 (Two) Times a Day. 180 capsule 1 7/5/2020 at 1000   • acetaminophen (TYLENOL) 325 MG tablet Take 650 mg by mouth Every 6 (Six) Hours As Needed.   7/4/2020       Allergies:  Amoxicillin and Levaquin [levofloxacin]    Debilities:  none    Objective     Vital Signs  Temp:  [97.9 °F (36.6 °C)] 97.9 °F (36.6 °C)  Heart Rate:  [70-72] 70  Resp:  [18-19] 18  BP: (150)/(73) 150/73  No intake or output data in the 24 hours ending 07/06/20 0743  Flowsheet Rows      First Filed Value   Admission Height  167.6 cm (66\") Documented at 07/06/2020 0642   Admission Weight  107 kg (236 lb) Documented at 07/06/2020 0642           Physical Exam:     General Appearance:    Alert, cooperative, NAD   HEENT:    No trauma, pupils reactive, hearing intact   Back:     No CVA tenderness   Lungs:     Respirations unlabored, no wheezing    Heart:    RRR, intact peripheral pulses   Abdomen:     Soft, NDNT, no masses, no guarding   :    Penis nl   Extremities:   No edema, no deformity   Lymphatic:   No neck or groin LAD   Skin:   No bleeding, bruising or rashes   Neuro/Psych:   Orientation intact, " mood/affect pleasant, no focal findings     Results Review:    I reviewed the patient's new clinical results.  Results for orders placed or performed in visit on 07/03/20   COVID-19,BIOTAP, NP/OP SWAB IN TRANSPORT MEDIA OR SALINE 24-36 HR TAT - Swab, Nasopharynx   Result Value Ref Range    Reference Lab Report      COVID19 Not Detected Not Detected - Ref. Range        Assessment:  Bladder Outlet Obstruction    Plan:    Transurethral resectoin of the prostate    I discussed the patients findings and my recommendations with patient.   Risks, complications, outcomes and alternatives discussed with the patient at the bedside and office.    Charanjit Cameron MD  07/06/20  07:43

## 2020-07-06 NOTE — ANESTHESIA PREPROCEDURE EVALUATION
Anesthesia Evaluation     Patient summary reviewed and Nursing notes reviewed                Airway   Mallampati: III  Possible difficult intubation  Dental      Pulmonary    (+) a smoker Former, asthma,sleep apnea on CPAP,   Cardiovascular     ECG reviewed  Rhythm: regular  Rate: normal    (+) hypertension, hyperlipidemia,       Neuro/Psych  (+) psychiatric history Anxiety,     GI/Hepatic/Renal/Endo    (+) morbid obesity, GERD,  renal disease,     Musculoskeletal (-) negative ROS    Abdominal    Substance History - negative use     OB/GYN negative ob/gyn ROS         Other                      Anesthesia Plan    ASA 3     general   (CMAC available)  intravenous induction     Anesthetic plan, all risks, benefits, and alternatives have been provided, discussed and informed consent has been obtained with: patient.

## 2020-07-06 NOTE — NURSING NOTE
Pt forgot to take am meds prior to arrival-s/w Marita and pt took Coreg, Levothyroxine and Zoloft this am at 0737 while in preop-meds brought in from home.

## 2020-07-06 NOTE — OP NOTE
CYSTOSCOPY TRANSURETHRAL RESECTION OF PROSTATE  Procedure Note    Dick Galindo  7/6/2020    Pre-op Diagnosis:   BPH with LUTS    Post-op Diagnosis:     Post-Op Diagnosis Codes:     * BPH with obstruction/lower urinary tract symptoms [N40.1, N13.8]    Procedure(s):  TRANSURETHRAL RESECTION OF PROSTATE    Surgeon(s):  Charanjit Cameron MD    Anesthesia: General    Staff:   Circulator: Basil Holloway RN  Scrub Person: Camelia Rod    Estimated Blood Loss: minimal    Specimens:                Order Name Source Comment Collection Info Order Time   TISSUE PATHOLOGY EXAM Prostate PROSTATE SHAVINGS FOR PERMANENT IN FORMALIN. Collected By: Charanjit Cameron MD 7/6/2020  8:27 AM         Drains:   Urethral Catheter Latex;Straight-tip;Other (Comment) 24 Fr. (Active)       Findings: Bilobar hyperplasia of the prostate with prominent median lobe, mild to moderate trabeculation the bladder.  Orifice ease well recognized.    Complications: None apparent    Indications: 75-year-old gentleman with bladder outlet obstruction and intractable lower urinary tract symptoms that failed to improve with medical management.  Now presents for TURP.    Procedure: Patient was taken to the op suite given general anesthesia.  Placed in a comfortable supine then lithotomy position.  Prepped and draped in a sterile fashion.  The resectoscope was placed.  His urethra is normal.  The prostatic urethra showed trilobar hyperplasia with a very large median lobe prominent lateral lobes.  Moderate trabeculation of bladder was seen.  No diverticulum or cellules were noted.  No tumor was seen within the lumen the bladder.  Prostate was resected in a Dortches fashion with the continuous-flow A[facility]I bipolar resectoscope.  Hemostasis was excellent.  Large amount of tissue was obtained from the median lobe and lateral lobes and went about his deep as I felt comfortable on the lateral lobes get into the capsule and several areas.  The apical  area was just a little bit more difficult to resect because of angulation and I went about as deep as I felt comfortable trying to stay away from the level of the very room in the sphincter.  Everything was wide open at this point.  I irrigated all the chips out.  Lidocaine was placed in the urethra and a 24 Stateless hematuria 3-way catheter is placed to continuous flow irrigation.  B&O suppository was inserted in the rectum.  He was taken recovery in stable condition.      Charanjit Cameron MD     Date: 7/6/2020  Time: 09:02

## 2020-07-06 NOTE — PLAN OF CARE
Arrived from surgery in stable condition. CBI running with light pink urine output. Regular diet. VSS. Will continue to monitor.

## 2020-07-06 NOTE — PROGRESS NOTES
[facility] Progress Note    07/06/20 18:43        Very scant hematuria.  Drip is on minimal and no signs of any clots.  Very little bladder spasms all in all he is doing well.  We will plan to remove his catheter in the morning.

## 2020-07-07 ENCOUNTER — READMISSION MANAGEMENT (OUTPATIENT)
Dept: CALL CENTER | Facility: HOSPITAL | Age: 75
End: 2020-07-07

## 2020-07-07 VITALS
RESPIRATION RATE: 18 BRPM | BODY MASS INDEX: 37.93 KG/M2 | HEIGHT: 66 IN | TEMPERATURE: 97.5 F | WEIGHT: 236 LBS | SYSTOLIC BLOOD PRESSURE: 137 MMHG | OXYGEN SATURATION: 96 % | DIASTOLIC BLOOD PRESSURE: 73 MMHG | HEART RATE: 78 BPM

## 2020-07-07 LAB
ANION GAP SERPL CALCULATED.3IONS-SCNC: 9.7 MMOL/L (ref 5–15)
BUN SERPL-MCNC: 19 MG/DL (ref 8–23)
BUN/CREAT SERPL: 18.1 (ref 7–25)
CALCIUM SPEC-SCNC: 8.6 MG/DL (ref 8.6–10.5)
CHLORIDE SERPL-SCNC: 106 MMOL/L (ref 98–107)
CO2 SERPL-SCNC: 24.3 MMOL/L (ref 22–29)
CREAT SERPL-MCNC: 1.05 MG/DL (ref 0.76–1.27)
CYTO UR: NORMAL
DEPRECATED RDW RBC AUTO: 40.9 FL (ref 37–54)
ERYTHROCYTE [DISTWIDTH] IN BLOOD BY AUTOMATED COUNT: 13.2 % (ref 12.3–15.4)
GFR SERPL CREATININE-BSD FRML MDRD: 69 ML/MIN/1.73
GLUCOSE SERPL-MCNC: 128 MG/DL (ref 65–99)
HCT VFR BLD AUTO: 29.6 % (ref 37.5–51)
HGB BLD-MCNC: 9.2 G/DL (ref 13–17.7)
LAB AP CASE REPORT: NORMAL
LAB AP CLINICAL INFORMATION: NORMAL
LAB AP DIAGNOSIS COMMENT: NORMAL
MCH RBC QN AUTO: 26.4 PG (ref 26.6–33)
MCHC RBC AUTO-ENTMCNC: 31.1 G/DL (ref 31.5–35.7)
MCV RBC AUTO: 84.8 FL (ref 79–97)
PATH REPORT.FINAL DX SPEC: NORMAL
PATH REPORT.GROSS SPEC: NORMAL
PLATELET # BLD AUTO: 211 10*3/MM3 (ref 140–450)
PMV BLD AUTO: 9.6 FL (ref 6–12)
POTASSIUM SERPL-SCNC: 3.9 MMOL/L (ref 3.5–5.2)
RBC # BLD AUTO: 3.49 10*6/MM3 (ref 4.14–5.8)
SODIUM SERPL-SCNC: 140 MMOL/L (ref 136–145)
WBC # BLD AUTO: 11.03 10*3/MM3 (ref 3.4–10.8)

## 2020-07-07 PROCEDURE — 80048 BASIC METABOLIC PNL TOTAL CA: CPT | Performed by: UROLOGY

## 2020-07-07 PROCEDURE — 85027 COMPLETE CBC AUTOMATED: CPT | Performed by: UROLOGY

## 2020-07-07 PROCEDURE — G0378 HOSPITAL OBSERVATION PER HR: HCPCS

## 2020-07-07 RX ORDER — AMOXICILLIN 250 MG
2 CAPSULE ORAL NIGHTLY
Qty: 60 TABLET | Refills: 0 | Status: SHIPPED | OUTPATIENT
Start: 2020-07-07 | End: 2021-04-22

## 2020-07-07 RX ORDER — HYDROCODONE BITARTRATE AND ACETAMINOPHEN 5; 325 MG/1; MG/1
1 TABLET ORAL EVERY 4 HOURS PRN
Qty: 30 TABLET | Refills: 0 | Status: SHIPPED | OUTPATIENT
Start: 2020-07-07 | End: 2020-07-16

## 2020-07-07 RX ORDER — PHENAZOPYRIDINE HYDROCHLORIDE 200 MG/1
200 TABLET, FILM COATED ORAL 3 TIMES DAILY PRN
Qty: 30 TABLET | Refills: 0 | Status: SHIPPED | OUTPATIENT
Start: 2020-07-07 | End: 2020-09-15

## 2020-07-07 RX ORDER — CEPHALEXIN 500 MG/1
500 CAPSULE ORAL 4 TIMES DAILY
Qty: 40 CAPSULE | Refills: 0 | Status: SHIPPED | OUTPATIENT
Start: 2020-07-07 | End: 2020-07-17

## 2020-07-07 RX ADMIN — ATORVASTATIN CALCIUM 40 MG: 20 TABLET, FILM COATED ORAL at 09:37

## 2020-07-07 RX ADMIN — LISINOPRIL 40 MG: 40 TABLET ORAL at 09:37

## 2020-07-07 RX ADMIN — CARVEDILOL 6.25 MG: 6.25 TABLET, FILM COATED ORAL at 09:37

## 2020-07-07 RX ADMIN — SODIUM CHLORIDE 100 ML/HR: 9 INJECTION, SOLUTION INTRAVENOUS at 09:37

## 2020-07-07 RX ADMIN — SERTRALINE 50 MG: 50 TABLET, FILM COATED ORAL at 09:37

## 2020-07-07 RX ADMIN — AMLODIPINE BESYLATE 10 MG: 10 TABLET ORAL at 09:37

## 2020-07-07 NOTE — PLAN OF CARE
Problem: Patient Care Overview  Goal: Plan of Care Review  Outcome: Ongoing (interventions implemented as appropriate)  Flowsheets  Taken 7/7/2020 0889  Progress: improving  Outcome Summary: VSS, A&Ox4, minimal discomfort, norco for relief, TURP drip infusing, no clots noticed, urine yellow, slightly pink tinged at times, grimes to be dc'd this am, will continue to monitor.  Taken 7/6/2020 2055  Plan of Care Reviewed With: patient

## 2020-07-08 ENCOUNTER — OFFICE VISIT (OUTPATIENT)
Dept: FAMILY MEDICINE CLINIC | Facility: CLINIC | Age: 75
End: 2020-07-08

## 2020-07-08 ENCOUNTER — TRANSITIONAL CARE MANAGEMENT TELEPHONE ENCOUNTER (OUTPATIENT)
Dept: CALL CENTER | Facility: HOSPITAL | Age: 75
End: 2020-07-08

## 2020-07-08 VITALS
WEIGHT: 238 LBS | SYSTOLIC BLOOD PRESSURE: 121 MMHG | TEMPERATURE: 98.4 F | OXYGEN SATURATION: 99 % | HEIGHT: 66 IN | DIASTOLIC BLOOD PRESSURE: 63 MMHG | HEART RATE: 65 BPM | BODY MASS INDEX: 38.25 KG/M2

## 2020-07-08 DIAGNOSIS — D64.9 ANEMIA, UNSPECIFIED TYPE: Primary | ICD-10-CM

## 2020-07-08 PROCEDURE — 99214 OFFICE O/P EST MOD 30 MIN: CPT | Performed by: FAMILY MEDICINE

## 2020-07-08 NOTE — OUTREACH NOTE
Call Center TCM Note      Responses   [facility] patient discharged from?  [place]   COVID-19 Test Status  Negative   Does the patient have one of the following disease processes/diagnoses(primary or secondary)?  Other   TCM attempt successful?  Yes   Call start time  1310   Call end time  1311   Discharge diagnosis  TRANSURETHRAL RESECTION OF PROSTATE   TCM call completed?  Yes   Wrap up additional comments  Call was brief.  Pt states he goes at 3:00 to see Dr. Russ today regarding his hemoglobin          Cecilia Collins LPN    7/8/2020, 13:13

## 2020-07-08 NOTE — DISCHARGE SUMMARY
Date of Discharge:  07/07/2020    Discharge Diagnosis: Benign prostatic hyperplasia with bladder outlet obstruction    Presenting Problem/History of Present Illness  BPH with obstruction/lower urinary tract symptoms [N40.1, N13.8]     75-year-old gentleman with severe obstructive voiding symptoms failed medication now presents for TURP  Hospital Course  Patient is a 75 y.o. male presented with bladder outlet obstruction.  He underwent transurethral section of prostate.  Irrigation was weaned down.  Catheter was removed on postop day #1.  He voided well and was discharged home.  Pathology came back on postop day 2 after he been discharged and this showed 40 g of BPH.  No carcinoma identified.  Sent home with oral antibiotics and pain medication..      Procedures Performed  Procedure(s):  TRANSURETHRAL RESECTION OF PROSTATE       Consults:   Consults     No orders found from 6/7/2020 to 7/7/2020.          Pertinent Test Results: labs: Routine      Condition on Discharge: Improved    Vital Signs  Temp:  [98.4 °F (36.9 °C)] 98.4 °F (36.9 °C)  Heart Rate:  [65] 65  BP: (121)/(63) 121/63    Physical Exam:     General Appearance:    Alert, cooperative, in no acute distress   Head:    Normocephalic, without obvious abnormality, atraumatic   Eyes:            Lids and lashes normal, conjunctivae and sclerae normal, no   icterus, no pallor, corneas clear, PERRLA   Ears:    Ears appear intact with no abnormalities noted   Throat:   No oral lesions, no thrush, oral mucosa moist   Neck:   No adenopathy, supple, trachea midline, no thyromegaly, no   carotid bruit, no JVD   Back:     No kyphosis present, no scoliosis present, no skin lesions,      erythema or scars, no tenderness to percussion or                   palpation,   range of motion normal   Lungs:     Clear to auscultation,respirations regular, even and                  unlabored    Heart:    Regular rhythm and normal rate, normal S1 and S2, no            murmur, no  gallop, no rub, no click   Chest Wall:    No abnormalities observed   Abdomen:     Normal bowel sounds, no masses, no organomegaly, soft        non-tender, non-distended, no guarding, no rebound                tenderness   Rectal:     Deferred   Extremities:   Moves all extremities well, no edema, no cyanosis, no             redness   Pulses:   Pulses palpable and equal bilaterally   Skin:   No bleeding, bruising or rash   Lymph nodes:   No palpable adenopathy   Neurologic:   Cranial nerves 2 - 12 grossly intact, sensation intact, DTR       present and equal bilaterally       Discharge Disposition  Home or Self Care    Discharge Medications     Discharge Medications      New Medications      Instructions Start Date   cephalexin 500 MG capsule  Commonly known as:  KEFLEX   500 mg, Oral, 4 Times Daily      HYDROcodone-acetaminophen 5-325 MG per tablet  Commonly known as:  NORCO   1 tablet, Oral, Every 4 Hours PRN      phenazopyridine 200 MG tablet  Commonly known as:  Pyridium   200 mg, Oral, 3 Times Daily PRN      sennosides-docusate 8.6-50 MG per tablet  Commonly known as:  PERICOLACE   2 tablets, Oral, Nightly         Changes to Medications      Instructions Start Date   albuterol sulfate  (90 Base) MCG/ACT inhaler  Commonly known as:  Ventolin HFA  What changed:    · how much to take  · how to take this  · when to take this  · reasons to take this   2 puffs Q4H for cough and wheeze         Continue These Medications      Instructions Start Date   acetaminophen 325 MG tablet  Commonly known as:  TYLENOL   650 mg, Oral, Every 6 Hours PRN      amLODIPine 10 MG tablet  Commonly known as:  NORVASC   10 mg, Oral, Daily      atorvastatin 40 MG tablet  Commonly known as:  LIPITOR   40 mg, Oral, Daily      carvedilol 6.25 MG tablet  Commonly known as:  COREG   6.25 mg, Oral, 2 Times Daily With Meals      levothyroxine 50 MCG tablet  Commonly known as:  SYNTHROID, LEVOTHROID   50 mcg, Oral, Daily      lisinopril 40  MG tablet  Commonly known as:  PRINIVIL,ZESTRIL   40 mg, Oral, Daily      sertraline 50 MG tablet  Commonly known as:  ZOLOFT   50 mg, Oral, Daily             Discharge Diet: As tolerated    Activity at Discharge: Outlined to the patient with restrictions for the next 2 weeks to avoid bleeding    Follow-up Appointments  Future Appointments   Date Time Provider Department Center   12/1/2020  9:00 AM Mynor Russ MD [facility] None   12/2/2020 10:00 AM Kendrick Diaz MD [facility] None     Additional Instructions for the Follow-ups that You Need to Schedule     Discharge Follow-up with Specified Provider: Dr ELYSSA Cameron; 2 Weeks   As directed      To:  Dr ELYSSA Cameron    Follow Up:  2 Weeks               Test Results Pending at Discharge       Charanjit Cameron MD  07/08/20  16:06    Time: Discharge 30 min

## 2020-07-08 NOTE — PROGRESS NOTES
Subjective   Dick Galindo is a 75 y.o. male.     Chief Complaint   Patient presents with   • Benign Prostatic Hypertrophy     hospital follow up    • Joint Swelling     bilateral         History of Present Illness    Anemia.  Found preoperatively.  2 days ago had a TURP.  Pathology report was negative for malignancy.  He was having lower urinary tract symptoms with a high postvoid residual.    Previously had some mild iron deficiency anemia about 2-1/2 years ago.  Hemoglobin got down to about 11.  Ferritin was low but the iron was normal.  In 2018 he had a EGD and colonoscopy with GI and it was unrevealing.  He was placed on iron pills for few weeks and then his hemoglobin normalized up in the 12 range.    On June 29, before the surgery, his hemoglobin was 9.7 with a normal white count and normal platelet count.  His mean cell volume was normal and his Red cell distribution width was normal.  Postoperatively his hemoglobin dipped to 9.2.  He has had maybe a little shortness of breath over the recent months and some ankle swelling.  Otherwise is felt fine.  He has had no blood in the stool.  He is having blood in the urine now postoperatively but no blood prior to the surgery.  His diet does not include much red meat but he does eat salads.    He has had no abdominal pain.  No jaundice.  I do not have a recent bilirubin count.    He states he is passing urine now.  No catheterization.  He is on cephalexin currently and Pyridium prescribed by the urologist.    The following portions of the patient's history were reviewed and updated as appropriate: allergies, current medications, past family history, past medical history, past social history, past surgical history and problem list.          Review of Systems   Constitutional: Negative.    Respiratory: Positive for shortness of breath.    Cardiovascular: Negative.    Gastrointestinal: Negative for blood in stool.   Genitourinary: Positive for hematuria.  "  Neurological: Negative.    Psychiatric/Behavioral: Negative.        Objective   Blood pressure 121/63, pulse 65, temperature 98.4 °F (36.9 °C), temperature source Tympanic, height 167.6 cm (65.98\"), weight 108 kg (238 lb), SpO2 99 %.  Physical Exam   Constitutional: He appears well-developed and well-nourished. No distress.   Eyes: No scleral icterus.   Neck: No thyromegaly present.   Cardiovascular: Normal rate, regular rhythm and intact distal pulses.   Murmur ( Faint 1/6 murmur right sternal border) heard.  Pulmonary/Chest: Effort normal and breath sounds normal.   Abdominal: Soft. Bowel sounds are normal. He exhibits no distension and no mass. There is no tenderness. There is no rebound and no guarding.   Point-of-care ultrasound reveals a slightly distended bladder with a 2-hour post void residual volume of between 200 5300 cc.  There appears to be some blood clots in the bladder inferiorly but otherwise unremarkable.   Musculoskeletal: He exhibits no edema.   Skin: Skin is warm and dry.   No jaundice   Psychiatric: He has a normal mood and affect. His behavior is normal. Judgment and thought content normal.   Nursing note and vitals reviewed.      Assessment/Plan   Dick was seen today for benign prostatic hypertrophy and joint swelling.    Diagnoses and all orders for this visit:    Anemia, unspecified type  -     Iron  -     Vitamin B12 and Folate  -     Ferritin  -     CBC and Differential  -     Reticulocytes  -     Lactate Dehydrogenase  -     Haptoglobin  -     Peripheral Blood Smear  -     Comprehensive Metabolic Panel  -     Ambulatory Referral to Hematology      Normocytic anemia.  Not postoperative.  The anemia was prior to the recent TURP.  Negative GI work-up 2 years ago.  No symptoms other than maybe some shortness of breath.    Needs further work-up.  I recommend he contact his gastroenterologist.  He is due for repeat colonoscopy.    Checking above lab work.  Holding off iron supplementation " at this time, he is on postoperative hydrocodone and that may exacerbate constipation issues.    Hematology consultation.    I will see him back pending test results.

## 2020-07-08 NOTE — OUTREACH NOTE
Prep Survey      Responses   [facility] patient discharged from?  [place]   Is LACE score < 7 ?  Yes   Eligibility  [facility]   Date of Admission  07/06/20   Date of Discharge  07/07/20   Discharge Disposition  Home or Self Care   Discharge diagnosis  TRANSURETHRAL RESECTION OF PROSTATE   COVID-19 Test Status  Negative   Does the patient have one of the following disease processes/diagnoses(primary or secondary)?  Other   Does the patient have Home health ordered?  No   Is there a DME ordered?  No   Prep survey completed?  Yes          Marilee Jett RN

## 2020-07-08 NOTE — PROGRESS NOTES
Case Management Discharge Note      Final Note: Home with family--no needs    Provided Post Acute Provider List?: N/A  N/A Provider List Comment: pt denies any needs for time of DC    Destination      No service has been selected for the patient.      Durable Medical Equipment      No service has been selected for the patient.      Dialysis/Infusion      No service has been selected for the patient.      Home Medical Care      No service has been selected for the patient.      Therapy      No service has been selected for the patient.      Community Resources      No service has been selected for the patient.             Final Discharge Disposition Code: 01 - home or self-care

## 2020-07-10 LAB
ALBUMIN SERPL-MCNC: 4 G/DL (ref 3.5–5.2)
ALBUMIN/GLOB SERPL: 1.7 G/DL
ALP SERPL-CCNC: 66 U/L (ref 39–117)
ALT SERPL-CCNC: 13 U/L (ref 1–41)
AST SERPL-CCNC: 13 U/L (ref 1–40)
BASOPHILS # BLD AUTO: 0 X10E3/UL (ref 0–0.2)
BASOPHILS NFR BLD AUTO: 0 %
BILIRUB SERPL-MCNC: 0.4 MG/DL (ref 0–1.2)
BUN SERPL-MCNC: 23 MG/DL (ref 8–23)
BUN/CREAT SERPL: 19.8 (ref 7–25)
CALCIUM SERPL-MCNC: 9.1 MG/DL (ref 8.6–10.5)
CHLORIDE SERPL-SCNC: 106 MMOL/L (ref 98–107)
CO2 SERPL-SCNC: 25.2 MMOL/L (ref 22–29)
CREAT SERPL-MCNC: 1.16 MG/DL (ref 0.76–1.27)
EOSINOPHIL # BLD AUTO: 0.1 X10E3/UL (ref 0–0.4)
EOSINOPHIL NFR BLD AUTO: 1 %
ERYTHROCYTE [DISTWIDTH] IN BLOOD BY AUTOMATED COUNT: 13.7 % (ref 11.6–15.4)
FERRITIN SERPL-MCNC: 12.9 NG/ML (ref 30–400)
FOLATE SERPL-MCNC: 10.7 NG/ML (ref 4.78–24.2)
GLOBULIN SER CALC-MCNC: 2.4 GM/DL
GLUCOSE SERPL-MCNC: 88 MG/DL (ref 65–99)
HAPTOGLOB SERPL-MCNC: 195 MG/DL (ref 34–355)
HCT VFR BLD AUTO: 30 % (ref 37.5–51)
HGB BLD-MCNC: 9.6 G/DL (ref 13–17.7)
IMM GRANULOCYTES # BLD AUTO: 0 X10E3/UL (ref 0–0.1)
IMM GRANULOCYTES NFR BLD AUTO: 0 %
IRON SERPL-MCNC: 23 MCG/DL (ref 59–158)
LDH SERPL-CCNC: 187 U/L (ref 135–225)
LYMPHOCYTES # BLD AUTO: 2 X10E3/UL (ref 0.7–3.1)
LYMPHOCYTES NFR BLD AUTO: 19 %
MCH RBC QN AUTO: 26.4 PG (ref 26.6–33)
MCHC RBC AUTO-ENTMCNC: 32 G/DL (ref 31.5–35.7)
MCV RBC AUTO: 83 FL (ref 79–97)
MONOCYTES # BLD AUTO: 1.1 X10E3/UL (ref 0.1–0.9)
MONOCYTES NFR BLD AUTO: 11 %
NEUTROPHILS # BLD AUTO: 7.4 X10E3/UL (ref 1.4–7)
NEUTROPHILS NFR BLD AUTO: 69 %
PATH INTERP BLD-IMP: ABNORMAL
PATH REV BLD -IMP: ABNORMAL
PATHOLOGIST NAME: ABNORMAL
PLATELET # BLD AUTO: 250 X10E3/UL (ref 150–450)
POTASSIUM SERPL-SCNC: 3.8 MMOL/L (ref 3.5–5.2)
PROT SERPL-MCNC: 6.4 G/DL (ref 6–8.5)
RBC # BLD AUTO: 3.63 X10E6/UL (ref 4.14–5.8)
RETICS/RBC NFR AUTO: 1.33 % (ref 0.7–1.9)
SODIUM SERPL-SCNC: 141 MMOL/L (ref 136–145)
VIT B12 SERPL-MCNC: 342 PG/ML (ref 211–946)
WBC # BLD AUTO: 10.7 X10E3/UL (ref 3.4–10.8)

## 2020-07-10 NOTE — PROGRESS NOTES
I called patient.  There is evidence of iron deficiency anemia.  The GI work-up in recent months has been negative.  I want him to see the hematologist.  He has an appointment scheduled for later this month.  I want him to start over-the-counter iron tablets twice a day.  With GI upset decrease to once a day.

## 2020-07-23 ENCOUNTER — APPOINTMENT (OUTPATIENT)
Dept: OTHER | Facility: HOSPITAL | Age: 75
End: 2020-07-23

## 2020-07-23 ENCOUNTER — CONSULT (OUTPATIENT)
Dept: ONCOLOGY | Facility: CLINIC | Age: 75
End: 2020-07-23

## 2020-07-23 VITALS
BODY MASS INDEX: 37.4 KG/M2 | RESPIRATION RATE: 16 BRPM | HEIGHT: 65 IN | OXYGEN SATURATION: 96 % | SYSTOLIC BLOOD PRESSURE: 126 MMHG | DIASTOLIC BLOOD PRESSURE: 85 MMHG | HEART RATE: 61 BPM | TEMPERATURE: 98.7 F | WEIGHT: 224.5 LBS

## 2020-07-23 DIAGNOSIS — R89.9 ABNORMAL LABORATORY TEST: Primary | ICD-10-CM

## 2020-07-23 DIAGNOSIS — D50.0 IRON DEFICIENCY ANEMIA DUE TO CHRONIC BLOOD LOSS: Primary | ICD-10-CM

## 2020-07-23 LAB
BASOPHILS # BLD AUTO: 0.09 10*3/MM3 (ref 0–0.2)
BASOPHILS NFR BLD AUTO: 1.1 % (ref 0–1.5)
DEPRECATED RDW RBC AUTO: 46.1 FL (ref 37–54)
EOSINOPHIL # BLD AUTO: 0.2 10*3/MM3 (ref 0–0.4)
EOSINOPHIL NFR BLD AUTO: 2.4 % (ref 0.3–6.2)
ERYTHROCYTE [DISTWIDTH] IN BLOOD BY AUTOMATED COUNT: 15.7 % (ref 12.3–15.4)
HCT VFR BLD AUTO: 36.6 % (ref 37.5–51)
HGB BLD-MCNC: 11.4 G/DL (ref 13–17.7)
IMM GRANULOCYTES # BLD AUTO: 0.05 10*3/MM3 (ref 0–0.05)
IMM GRANULOCYTES NFR BLD AUTO: 0.6 % (ref 0–0.5)
LYMPHOCYTES # BLD AUTO: 1.38 10*3/MM3 (ref 0.7–3.1)
LYMPHOCYTES NFR BLD AUTO: 16.7 % (ref 19.6–45.3)
MCH RBC QN AUTO: 25.7 PG (ref 26.6–33)
MCHC RBC AUTO-ENTMCNC: 31.1 G/DL (ref 31.5–35.7)
MCV RBC AUTO: 82.4 FL (ref 79–97)
MONOCYTES # BLD AUTO: 0.71 10*3/MM3 (ref 0.1–0.9)
MONOCYTES NFR BLD AUTO: 8.6 % (ref 5–12)
NEUTROPHILS NFR BLD AUTO: 5.81 10*3/MM3 (ref 1.7–7)
NEUTROPHILS NFR BLD AUTO: 70.6 % (ref 42.7–76)
NRBC BLD AUTO-RTO: 0 /100 WBC (ref 0–0.2)
PLATELET # BLD AUTO: 242 10*3/MM3 (ref 140–450)
PMV BLD AUTO: 8.7 FL (ref 6–12)
RBC # BLD AUTO: 4.44 10*6/MM3 (ref 4.14–5.8)
WBC # BLD AUTO: 8.24 10*3/MM3 (ref 3.4–10.8)

## 2020-07-23 PROCEDURE — 85025 COMPLETE CBC W/AUTO DIFF WBC: CPT | Performed by: INTERNAL MEDICINE

## 2020-07-23 PROCEDURE — 36415 COLL VENOUS BLD VENIPUNCTURE: CPT

## 2020-07-23 PROCEDURE — 99204 OFFICE O/P NEW MOD 45 MIN: CPT | Performed by: INTERNAL MEDICINE

## 2020-07-23 NOTE — PROGRESS NOTES
Subjective   Dick Galindo is a 75 y.o. male.  Referred by Dr. Rodriguez for evaluation and management of anemia    History of Present Illness   Mr. Dubois is a 75-year-old  male with history of hypertension, hyperlipidemia, BPH status post TURP on 7/7/2020 presents for further evaluation of anemia.  He was scheduled for the TURP procedure on 7/7/2020 at which point a CBC was checked and his hemoglobin was noted to be low at 9.2.  This prompted further work-up with vitamin B12, folic acid, iron studies.  The vitamin B12 and folic acid levels were noted to be normal.  Iron studies showed a low ferritin consistent with iron deficiency.  He was then placed on oral iron which he has been taking for the past 2 weeks.  Today a repeat CBC shows an improvement in hemoglobin to 11.2.  He denies any lightheadedness, dizziness, chest pain, dyspnea, palpitations, lower extremity edema.  He reports having lost about 10 pounds over the past 2 weeks, he attributes this to changes in his diet as well as decreased appetite since the surgery.  He denies noting any blood in his stool any melena.  Denies any hematemesis.  He has been on Prevacid for a long time now for GERD.  His last colonoscopy was in 2018 on which there was diverticulosis, polyps and internal hemorrhoids noted.  EGD was also performed in 2018 on which mildly erythematous mucosa of the esophagus was noted but no other abnormalities.  He was scheduled for a colonoscopy in February 2020 by Dr. Roper however this has been delayed because of COVID and he plans to undergo colonoscopy soon.    The following portions of the patient's history were reviewed and updated as appropriate: allergies, current medications, past family history, past medical history, past social history, past surgical history and problem list.    Past Medical History:   Diagnosis Date   • Anxiety    • Asthma    • Colon polyps    • Disease of thyroid gland    • Enlarged prostate    • High  "cholesterol    • History of anemia    • Hypertension    • Sleep apnea     CPAP   • Slow urinary stream         Past Surgical History:   Procedure Laterality Date   • COLONOSCOPY  approx 2012    • COLONOSCOPY N/A 2/6/2018    Procedure: COLONOSCOPY INTO CEECUM AND TERMINAL MILEUM WITH COLD BIOPSY POLYPECTOMIES & COLD BIOIPSIES;  Surgeon: Usman Roper MD;  Location: [facility] ENDOSCOPY;  Service:    • CYSTOSCOPY TRANSURETHRAL RESECTION OF PROSTATE N/A 7/6/2020    Procedure: TRANSURETHRAL RESECTION OF PROSTATE;  Surgeon: Charanjit Cameron MD;  Location: [facility] OR;  Service: Urology;  Laterality: N/A;   • ENDOSCOPY N/A 2/6/2018    Procedure: ESOPHAGOGASTRODUODENOSCOPY WITH COLD BIOPSIES;  Surgeon: Usman Roper MD;  Location: [facility] ENDOSCOPY;  Service:    • TONSILLECTOMY          Family History   Problem Relation Age of Onset   • Stroke Father    • Heart attack Father    • No Known Problems Maternal Grandmother    • No Known Problems Maternal Grandfather    • No Known Problems Paternal Grandmother    • No Known Problems Paternal Grandfather    • Malig Hyperthermia Neg Hx         Social History     Socioeconomic History   • Marital status:      Spouse name: Not on file   • Number of children: Not on file   • Years of education: Not on file   • Highest education level: Not on file   Tobacco Use   • Smoking status: Former Smoker   • Smokeless tobacco: Never Used   • Tobacco comment: He smoked between the ages of 18 and 32.   Substance and Sexual Activity   • Alcohol use: Yes     Alcohol/week: 1.0 standard drinks     Types: 1 Glasses of wine per week     Comment: \"not heavy\"   • Drug use: No             Allergies   Allergen Reactions   • Amoxicillin Itching   • Levaquin [Levofloxacin] Nausea And Vomiting            Review of Systems   Constitutional: Positive for unexpected weight loss. Negative for activity change, appetite change, chills, diaphoresis, fatigue, fever and unexpected weight gain.   HENT: Negative " for congestion, dental problem, drooling, ear discharge, ear pain, facial swelling, hearing loss, mouth sores, nosebleeds, postnasal drip, rhinorrhea, sinus pressure, sneezing, sore throat, swollen glands, tinnitus, trouble swallowing and voice change.    Eyes: Negative for blurred vision, double vision, photophobia, pain, discharge, redness, itching and visual disturbance.   Respiratory: Negative for apnea, cough, choking, chest tightness, shortness of breath, wheezing and stridor.    Cardiovascular: Negative for chest pain, palpitations and leg swelling.   Gastrointestinal: Positive for GERD. Negative for abdominal distention, abdominal pain, anal bleeding, blood in stool, constipation, diarrhea, nausea, rectal pain, vomiting and indigestion.   Endocrine: Negative for cold intolerance, heat intolerance, polydipsia, polyphagia and polyuria.   Genitourinary: Negative for decreased urine volume, difficulty urinating, discharge, dysuria, flank pain, frequency, genital sores, hematuria, nocturia, erectile dysfunction and urgency.   Musculoskeletal: Negative for arthralgias, back pain, gait problem, joint swelling, myalgias, neck pain, neck stiffness and bursitis.   Skin: Negative for color change, dry skin, pallor, rash, skin lesions and bruise.   Allergic/Immunologic: Negative for environmental allergies, food allergies and immunocompromised state.   Neurological: Negative for dizziness, tremors, seizures, syncope, facial asymmetry, speech difficulty, weakness, light-headedness, numbness, headache, memory problem and confusion.   Hematological: Negative for adenopathy. Does not bruise/bleed easily.   Psychiatric/Behavioral: Negative for agitation, behavioral problems, decreased concentration, dysphoric mood, hallucinations, self-injury, sleep disturbance, suicidal ideas, negative for hyperactivity, depressed mood and stress. The patient is not nervous/anxious.          Objective   Blood pressure 126/85, pulse 61,  "temperature 98.7 °F (37.1 °C), temperature source Skin, resp. rate 16, height 164.6 cm (64.8\"), weight 102 kg (224 lb 8 oz), SpO2 96 %.   Physical Exam   Constitutional: He is oriented to person, place, and time. He appears well-developed and well-nourished. No distress.   HENT:   Head: Normocephalic and atraumatic.   Right Ear: External ear normal.   Left Ear: External ear normal.   Nose: Nose normal.   Mouth/Throat: Oropharynx is clear and moist. No oropharyngeal exudate.   Eyes: Pupils are equal, round, and reactive to light. Conjunctivae and EOM are normal. Right eye exhibits no discharge. Left eye exhibits no discharge. No scleral icterus.   Neck: Normal range of motion. Neck supple. No JVD present. No tracheal deviation present. No thyromegaly present.   Cardiovascular: Normal rate, regular rhythm, normal heart sounds and intact distal pulses. Exam reveals no gallop and no friction rub.   No murmur heard.  Pulmonary/Chest: Effort normal and breath sounds normal. No stridor. No respiratory distress. He has no wheezes. He has no rales. He exhibits no tenderness.   Abdominal: Soft. Bowel sounds are normal. He exhibits no distension and no mass. There is no tenderness. There is no rebound and no guarding.   Musculoskeletal: Normal range of motion. He exhibits no edema, tenderness or deformity.   Lymphadenopathy:     He has no cervical adenopathy.   Neurological: He is alert and oriented to person, place, and time. No cranial nerve deficit. Coordination normal.   Skin: Skin is warm and dry. No rash noted. He is not diaphoretic. No erythema. No pallor.   Psychiatric: He has a normal mood and affect. His behavior is normal. Judgment and thought content normal.   Vitals reviewed.        Appointment on 07/23/2020   Component Date Value Ref Range Status   • WBC 07/23/2020 8.24  3.40 - 10.80 10*3/mm3 Final   • RBC 07/23/2020 4.44  4.14 - 5.80 10*6/mm3 Final   • Hemoglobin 07/23/2020 11.4* 13.0 - 17.7 g/dL Final   • " Hematocrit 07/23/2020 36.6* 37.5 - 51.0 % Final   • MCV 07/23/2020 82.4  79.0 - 97.0 fL Final   • MCH 07/23/2020 25.7* 26.6 - 33.0 pg Final   • MCHC 07/23/2020 31.1* 31.5 - 35.7 g/dL Final   • RDW 07/23/2020 15.7* 12.3 - 15.4 % Final   • RDW-SD 07/23/2020 46.1  37.0 - 54.0 fl Final   • MPV 07/23/2020 8.7  6.0 - 12.0 fL Final   • Platelets 07/23/2020 242  140 - 450 10*3/mm3 Final   • Neutrophil % 07/23/2020 70.6  42.7 - 76.0 % Final   • Lymphocyte % 07/23/2020 16.7* 19.6 - 45.3 % Final   • Monocyte % 07/23/2020 8.6  5.0 - 12.0 % Final   • Eosinophil % 07/23/2020 2.4  0.3 - 6.2 % Final   • Basophil % 07/23/2020 1.1  0.0 - 1.5 % Final   • Immature Grans % 07/23/2020 0.6* 0.0 - 0.5 % Final   • Neutrophils, Absolute 07/23/2020 5.81  1.70 - 7.00 10*3/mm3 Final   • Lymphocytes, Absolute 07/23/2020 1.38  0.70 - 3.10 10*3/mm3 Final   • Monocytes, Absolute 07/23/2020 0.71  0.10 - 0.90 10*3/mm3 Final   • Eosinophils, Absolute 07/23/2020 0.20  0.00 - 0.40 10*3/mm3 Final   • Basophils, Absolute 07/23/2020 0.09  0.00 - 0.20 10*3/mm3 Final   • Immature Grans, Absolute 07/23/2020 0.05  0.00 - 0.05 10*3/mm3 Final   • nRBC 07/23/2020 0.0  0.0 - 0.2 /100 WBC Final   Office Visit on 07/08/2020   Component Date Value Ref Range Status   • Iron 07/08/2020 23* 59 - 158 mcg/dL Final   • Vitamin B-12 07/08/2020 342  211 - 946 pg/mL Final    Results may be falsely increased if patient taking Biotin.   • Folate 07/08/2020 10.70  4.78 - 24.20 ng/mL Final    Results may be falsely increased if patient taking Biotin.   • Ferritin 07/08/2020 12.90* 30.00 - 400.00 ng/mL Final    Results may be falsely decreased if patient taking Biotin.   • Reticulocyte Absolute 07/08/2020 1.33  0.70 - 1.90 % Final   • LDH 07/08/2020 187  135 - 225 U/L Final   • Haptoglobin 07/08/2020 195  34 - 355 mg/dL Final   • WBC 07/08/2020 Comment   Final    Comment: Absolute granulocytosis with slight left shift. Absolute  monocytosis.     • RBC 07/08/2020 Comment   Final     Comment: Normochromic, normocytic anemia without significant  morphologic abnormality. Diagnostic possibilities include  early iron deficiency, decreased red cell production due to  drug/medication, chronic disease,neoplasm, and inflammatory  processes. Acute blood loss could also be considered.     • Platelets 07/08/2020 Comment   Final    Platelet morphology appears normal.   • Comment 07/08/2020 Comment   Final    Correlation and Clinically appropriate follow up suggested.   • Pathologist Review 07/08/2020 Comment   Final    Reviewed by: Judy Esposito MD, Pathologist   • WBC 07/08/2020 10.7  3.4 - 10.8 x10E3/uL Final   • RBC 07/08/2020 3.63* 4.14 - 5.80 x10E6/uL Final   • Hemoglobin 07/08/2020 9.6* 13.0 - 17.7 g/dL Final   • Hematocrit 07/08/2020 30.0* 37.5 - 51.0 % Final   • MCV 07/08/2020 83  79 - 97 fL Final   • MCH 07/08/2020 26.4* 26.6 - 33.0 pg Final   • MCHC 07/08/2020 32.0  31.5 - 35.7 g/dL Final   • RDW 07/08/2020 13.7  11.6 - 15.4 % Final   • Platelets 07/08/2020 250  150 - 450 x10E3/uL Final   • Neutrophil Rel % 07/08/2020 69  Not Estab. % Final   • Lymphocyte Rel % 07/08/2020 19  Not Estab. % Final   • Monocyte Rel % 07/08/2020 11  Not Estab. % Final   • Eosinophil Rel % 07/08/2020 1  Not Estab. % Final   • Basophil Rel % 07/08/2020 0  Not Estab. % Final   • Neutrophils Absolute 07/08/2020 7.4* 1.4 - 7.0 x10E3/uL Final   • Lymphocytes Absolute 07/08/2020 2.0  0.7 - 3.1 x10E3/uL Final   • Monocytes Absolute 07/08/2020 1.1* 0.1 - 0.9 x10E3/uL Final   • Eosinophils Absolute 07/08/2020 0.1  0.0 - 0.4 x10E3/uL Final   • Basophils Absolute 07/08/2020 0.0  0.0 - 0.2 x10E3/uL Final   • Immature Granulocyte Rel % 07/08/2020 0  Not Estab. % Final   • Immature Grans Absolute 07/08/2020 0.0  0.0 - 0.1 x10E3/uL Final   • Glucose 07/08/2020 88  65 - 99 mg/dL Final   • BUN 07/08/2020 23  8 - 23 mg/dL Final   • Creatinine 07/08/2020 1.16  0.76 - 1.27 mg/dL Final   • eGFR Non  Am 07/08/2020 62  >60 mL/min/1.73  Final    Comment: The MDRD GFR formula is only valid for adults with stable  renal function between ages 18 and 70.     • eGFR  Am 07/08/2020 75  >60 mL/min/1.73 Final   • BUN/Creatinine Ratio 07/08/2020 19.8  7.0 - 25.0 Final   • Sodium 07/08/2020 141  136 - 145 mmol/L Final   • Potassium 07/08/2020 3.8  3.5 - 5.2 mmol/L Final   • Chloride 07/08/2020 106  98 - 107 mmol/L Final   • Total CO2 07/08/2020 25.2  22.0 - 29.0 mmol/L Final   • Calcium 07/08/2020 9.1  8.6 - 10.5 mg/dL Final   • Total Protein 07/08/2020 6.4  6.0 - 8.5 g/dL Final   • Albumin 07/08/2020 4.00  3.50 - 5.20 g/dL Final   • Globulin 07/08/2020 2.4  gm/dL Final   • A/G Ratio 07/08/2020 1.7  g/dL Final   • Total Bilirubin 07/08/2020 0.4  0.0 - 1.2 mg/dL Final   • Alkaline Phosphatase 07/08/2020 66  39 - 117 U/L Final   • AST (SGOT) 07/08/2020 13  1 - 40 U/L Final   • ALT (SGPT) 07/08/2020 13  1 - 41 U/L Final   Admission on 07/06/2020, Discharged on 07/07/2020   Component Date Value Ref Range Status   • Case Report 07/06/2020    Final                    Value:Surgical Pathology Report                         Case: [id number]                                  Authorizing Provider:  Charanjit Cameron MD   Collected:           07/06/2020 08:23 AM          Ordering Location:     [facility]  Received:            07/06/2020 10:25 AM                                 MAIN OR                                                                      Pathologist:           La Carbajal MD                                                    Specimen:    Prostate                                                                                  • Clinical Information 07/06/2020    Final                    Value:This result contains rich text formatting which cannot be displayed here.   • Final Diagnosis 07/06/2020    Final                    Value:This result contains rich text formatting which cannot be displayed here.   • Comment  07/06/2020    Final                    Value:This result contains rich text formatting which cannot be displayed here.   • Gross Description 07/06/2020    Final                    Value:This result contains rich text formatting which cannot be displayed here.   • Microscopic Description 07/06/2020    Final                    Value:This result contains rich text formatting which cannot be displayed here.   • WBC 07/07/2020 11.03* 3.40 - 10.80 10*3/mm3 Final   • RBC 07/07/2020 3.49* 4.14 - 5.80 10*6/mm3 Final   • Hemoglobin 07/07/2020 9.2* 13.0 - 17.7 g/dL Final   • Hematocrit 07/07/2020 29.6* 37.5 - 51.0 % Final   • MCV 07/07/2020 84.8  79.0 - 97.0 fL Final   • MCH 07/07/2020 26.4* 26.6 - 33.0 pg Final   • MCHC 07/07/2020 31.1* 31.5 - 35.7 g/dL Final   • RDW 07/07/2020 13.2  12.3 - 15.4 % Final   • RDW-SD 07/07/2020 40.9  37.0 - 54.0 fl Final   • MPV 07/07/2020 9.6  6.0 - 12.0 fL Final   • Platelets 07/07/2020 211  140 - 450 10*3/mm3 Final   • Glucose 07/07/2020 128* 65 - 99 mg/dL Final   • BUN 07/07/2020 19  8 - 23 mg/dL Final   • Creatinine 07/07/2020 1.05  0.76 - 1.27 mg/dL Final   • Sodium 07/07/2020 140  136 - 145 mmol/L Final   • Potassium 07/07/2020 3.9  3.5 - 5.2 mmol/L Final   • Chloride 07/07/2020 106  98 - 107 mmol/L Final   • CO2 07/07/2020 24.3  22.0 - 29.0 mmol/L Final   • Calcium 07/07/2020 8.6  8.6 - 10.5 mg/dL Final   • eGFR Non African Amer 07/07/2020 69  >60 mL/min/1.73 Final   • BUN/Creatinine Ratio 07/07/2020 18.1  7.0 - 25.0 Final   • Anion Gap 07/07/2020 9.7  5.0 - 15.0 mmol/L Final   Lab on 07/03/2020   Component Date Value Ref Range Status   • COVID19 07/03/2020 Not Detected  Not Detected - Ref. Range Final   Appointment on 06/29/2020   Component Date Value Ref Range Status   • Glucose 06/29/2020 95  65 - 99 mg/dL Final   • BUN 06/29/2020 18  8 - 23 mg/dL Final   • Creatinine 06/29/2020 1.34* 0.76 - 1.27 mg/dL Final   • Sodium 06/29/2020 142  136 - 145 mmol/L Final   • Potassium 06/29/2020  4.4  3.5 - 5.2 mmol/L Final   • Chloride 06/29/2020 106  98 - 107 mmol/L Final   • CO2 06/29/2020 27.3  22.0 - 29.0 mmol/L Final   • Calcium 06/29/2020 9.2  8.6 - 10.5 mg/dL Final   • eGFR Non African Amer 06/29/2020 52* >60 mL/min/1.73 Final   • BUN/Creatinine Ratio 06/29/2020 13.4  7.0 - 25.0 Final   • Anion Gap 06/29/2020 8.7  5.0 - 15.0 mmol/L Final   • WBC 06/29/2020 6.34  3.40 - 10.80 10*3/mm3 Final   • RBC 06/29/2020 3.69* 4.14 - 5.80 10*6/mm3 Final   • Hemoglobin 06/29/2020 9.7* 13.0 - 17.7 g/dL Final   • Hematocrit 06/29/2020 30.9* 37.5 - 51.0 % Final   • MCV 06/29/2020 83.7  79.0 - 97.0 fL Final   • MCH 06/29/2020 26.3* 26.6 - 33.0 pg Final   • MCHC 06/29/2020 31.4* 31.5 - 35.7 g/dL Final   • RDW 06/29/2020 12.9  12.3 - 15.4 % Final   • RDW-SD 06/29/2020 39.2  37.0 - 54.0 fl Final   • MPV 06/29/2020 9.1  6.0 - 12.0 fL Final   • Platelets 06/29/2020 246  140 - 450 10*3/mm3 Final        No radiology results for the last 30 days.     CBC from 7/23/2020 reviewed by me and noted to have normal WBC count, hemoglobin low at 11.4, platelet count normal  CBC from 7/8/2020 reviewed by me and noted to have normal WBC count, hemoglobin low at 9.2, platelet count normal  CMP from 7/8/2020 reviewed by me and noted to have normal electrolytes, normal LFTs and normal creatinine.  Iron studies 7/8/2020 reviewed by me and noted to have a low ferritin level at 12.9 and low iron.  Reviewed EGD and colonoscopy reports from 2018.    Peripheral smear reviewed by me.  Noted to have normocytic RBC, normal WBC and platelet morphology in number.    Assessment/Plan      Mr. Holder is a 75-year-old  male referred for further work-up of anemia.  On reviewing the labs he is noted to have low hemoglobin since May 2019.  Ferritin was normal in the past however most recent ferritin from 7/8/2020 noted to be low at 12.  This is indicative of iron deficiency.  He has been taking oral iron for the past 2 weeks and hemoglobin has  improved to 11.4.  1.anemia  Work-up consistent with iron deficient  He is having a good response to oral iron  Continue oral iron.  Recommend every other day dosing.  Follow-up in 3 months with CBC, reticulocyte count, iron studies, CMP    2.hypertension-continue current medications.    3.C scope to evaluate possible GI blood loss    4.hyperlipidemia continue atorvastatin    5.mood-stable, continue Zoloft    6.follow-up-3 months with labs

## 2020-09-14 DIAGNOSIS — N28.1 BILATERAL RENAL CYSTS: Primary | ICD-10-CM

## 2020-09-14 NOTE — PROGRESS NOTES
Phone call patient.  Due for repeat CT scan.  Previous renal cysts.  Radiologist recommends follow-up with biphasic contrast.  Kidney CT scan ordered.  Patient aware.  He is also followed by urology.  I will see him as scheduled.

## 2020-09-15 ENCOUNTER — OFFICE VISIT (OUTPATIENT)
Dept: FAMILY MEDICINE CLINIC | Facility: CLINIC | Age: 75
End: 2020-09-15

## 2020-09-15 VITALS
WEIGHT: 225.6 LBS | DIASTOLIC BLOOD PRESSURE: 77 MMHG | HEART RATE: 57 BPM | BODY MASS INDEX: 37.59 KG/M2 | HEIGHT: 65 IN | SYSTOLIC BLOOD PRESSURE: 147 MMHG | TEMPERATURE: 97.7 F | OXYGEN SATURATION: 96 %

## 2020-09-15 DIAGNOSIS — L98.0 PYOGENIC GRANULOMA: Primary | ICD-10-CM

## 2020-09-15 PROCEDURE — 99213 OFFICE O/P EST LOW 20 MIN: CPT | Performed by: FAMILY MEDICINE

## 2020-09-15 NOTE — PROGRESS NOTES
"Subjective   Edterrence Galindo is a 75 y.o. male.     Chief Complaint   Patient presents with   • TOENAIL ISSUE     X 2 MONTHS, PT STATES TOENAIL IS DISCOLORED          History of Present Illness    Right great toe issue.  2 months.  Something growing underneath it.  It drains a clear discharge sometimes.  Neosporin seem to help a little bit.  Does not recall injury.  Hurts sometimes.  Not feverish.  Nondiabetic.  The nails getting somewhat discolored.  Progressively worse.  No other nail issues.  Otherwise feels well.  He was treated with antibiotics when he had his prostate removed earlier this summer.  He does not recall the told getting any better.      The following portions of the patient's history were reviewed and updated as appropriate: allergies, current medications, past family history, past medical history, past social history, past surgical history and problem list.          Review of Systems   Constitutional: Negative.    Musculoskeletal: Negative for joint swelling.   Skin: Negative for rash.       Objective   Blood pressure 147/77, pulse 57, temperature 97.7 °F (36.5 °C), temperature source Temporal, height 164.6 cm (64.8\"), weight 102 kg (225 lb 9.6 oz), SpO2 96 %.  Physical Exam  Constitutional:       General: He is not in acute distress.     Appearance: He is not ill-appearing.   Cardiovascular:      Rate and Rhythm: Normal rate.   Musculoskeletal:      Comments: Right great toe.  There is a 1 cm area of an inflammatory nodule under the nail.  Fleshy appearing.  Serous discharge.  Nonpurulent.  Minimal to no tenderness.  No dark pigmentation.         Assessment/Plan   Dick was seen today for toenail issue.    Diagnoses and all orders for this visit:    Pyogenic granuloma  -     Ambulatory Referral to Dermatology    Other orders  -     mupirocin (Bactroban) 2 % ointment; Apply  topically to the appropriate area as directed 3 (Three) Times a Day.      Inflammatory mass under the right great toenail. "  Suggestive of a pyogenic granuloma.  Cannot rule out a basal cell carcinoma or fungal infection.  I have placed a referral for a dermatologist.  Patient aware.

## 2020-09-21 ENCOUNTER — HOSPITAL ENCOUNTER (OUTPATIENT)
Dept: CT IMAGING | Facility: HOSPITAL | Age: 75
Discharge: HOME | End: 2020-09-21
Admitting: FAMILY MEDICINE

## 2020-09-21 DIAGNOSIS — N28.1 BILATERAL RENAL CYSTS: ICD-10-CM

## 2020-09-21 LAB — CREAT BLDA-MCNC: 0.9 MG/DL (ref 0.6–1.3)

## 2020-09-21 PROCEDURE — 25010000002 IOPAMIDOL 61 % SOLUTION: Performed by: FAMILY MEDICINE

## 2020-09-21 PROCEDURE — 82565 ASSAY OF CREATININE: CPT

## 2020-09-21 PROCEDURE — 74170 CT ABD WO CNTRST FLWD CNTRST: CPT

## 2020-09-21 RX ADMIN — IOPAMIDOL 85 ML: 612 INJECTION, SOLUTION INTRAVENOUS at 08:20

## 2020-09-21 NOTE — PROGRESS NOTES
The CT scan reveals a bilateral kidney cysts.  The radiologist recommends a one-year follow-up to ensure stability.  Otherwise no abnormalities of concern at this time.

## 2020-10-02 ENCOUNTER — OFFICE VISIT (OUTPATIENT)
Dept: FAMILY MEDICINE CLINIC | Facility: CLINIC | Age: 75
End: 2020-10-02

## 2020-10-02 VITALS
BODY MASS INDEX: 37.7 KG/M2 | SYSTOLIC BLOOD PRESSURE: 158 MMHG | TEMPERATURE: 97.5 F | WEIGHT: 226.3 LBS | HEIGHT: 65 IN | OXYGEN SATURATION: 98 % | DIASTOLIC BLOOD PRESSURE: 74 MMHG | HEART RATE: 63 BPM

## 2020-10-02 DIAGNOSIS — Z12.11 ENCOUNTER FOR SCREENING COLONOSCOPY: ICD-10-CM

## 2020-10-02 DIAGNOSIS — M48.07 SPINAL STENOSIS OF LUMBOSACRAL REGION: Primary | ICD-10-CM

## 2020-10-02 PROCEDURE — 99213 OFFICE O/P EST LOW 20 MIN: CPT | Performed by: FAMILY MEDICINE

## 2020-10-02 NOTE — PROGRESS NOTES
"Subjective   Dick Galindo is a 75 y.o. male.     Chief Complaint   Patient presents with   • Back Pain     C/O ongoing back pain in the lower back, off and on. HX of issues in S-5  area        History of Present Illness    Ongoing back issues for a number of years.  He states prior to 2016 he was diagnosed with lumbar spinal stenosis with a disc issue of the lumbar sacral area.  He often will get low-level sciatica down the left leg.  He states in recent weeks is been bothering him a little bit more.  Mostly a nuisance, not a problem.  Mild to moderate symptoms at best.  However earlier this week he was walking outside and twisted and all of a sudden had very severe pain in his left lower back going down to the left leg with some numbness.  However within a matter of minutes the pain was all better and he felt good.  He has had no red flag symptoms such as abdominal pain, fever, blood in the urine, or saddle anesthesia.  No urinary continence changes.  History of TURP couple months ago.  He states he does not feel weakness in his lower extremities.      The following portions of the patient's history were reviewed and updated as appropriate: allergies, current medications, past family history, past medical history, past social history, past surgical history and problem list.          Review of Systems   Constitutional: Negative.    Gastrointestinal: Negative.    Genitourinary: Negative.    Musculoskeletal: Positive for back pain.   Neurological: Positive for numbness. Negative for weakness.       Objective   Blood pressure 158/74, pulse 63, temperature 97.5 °F (36.4 °C), height 164.6 cm (64.8\"), weight 103 kg (226 lb 4.8 oz), SpO2 98 %.  Physical Exam  Constitutional:       Appearance: Normal appearance.   HENT:      Head: Atraumatic.   Cardiovascular:      Rate and Rhythm: Normal rate.   Musculoskeletal:      Comments: Lumbar spine with fair range of motion.  There is no midline pain to palpation.  No SI joint " pain to palpation.  No trochanteric hip pain to palpation.  Negative straight leg lift.  Strength is 5 out of 5 all major muscle groups in the lower extremities.  DTRs are +1/4 bilateral patellar reflex and Achilles reflex.  Gait is nonantalgic.  And unremarkable.   Neurological:      Mental Status: He is alert.         Assessment/Plan   Dick was seen today for back pain.    Diagnoses and all orders for this visit:    Spinal stenosis of lumbosacral region  -     Ambulatory Referral to Physical Therapy Evaluate and treat    Encounter for screening colonoscopy  -     Ambulatory Referral For Screening Colonoscopy      Lumbar spinal stenosis with slight exacerbation of pain recently.  At this time no definite red flag symptoms.  Holding off further imaging at this time.  I recommending physical therapy.  If not improving in the next 6 weeks to consider MRI.  Also with recurrent more severe symptoms like he had earlier this week, he is going to let us know and we will need to move up the MRI.  He will call with questions.  He will continue his regular activity.

## 2020-10-05 ENCOUNTER — TELEPHONE (OUTPATIENT)
Dept: GASTROENTEROLOGY | Facility: CLINIC | Age: 75
End: 2020-10-05

## 2020-10-05 NOTE — TELEPHONE ENCOUNTER
Spoke with patient and completed a colonoscopy screening questionnaire per phone.  Sent to Dr Roper to review.

## 2020-10-05 NOTE — TELEPHONE ENCOUNTER
----- Message from Darcy Gray sent at 9/14/2020  4:16 PM EDT -----  Regarding: SCHEDULE ROUTINE COLONOSCOPY  PT WANTS TO SCHEDULE ROUTINE COLONOSCOPY, SAID HAS BEEN ABOUT TWO YEARS SINCE LAST ONE. [phone]   [phone]

## 2020-10-06 ENCOUNTER — PREP FOR SURGERY (OUTPATIENT)
Dept: OTHER | Facility: HOSPITAL | Age: 75
End: 2020-10-06

## 2020-10-06 DIAGNOSIS — K63.5 COLON POLYP: Primary | ICD-10-CM

## 2020-10-15 ENCOUNTER — LAB (OUTPATIENT)
Dept: OTHER | Facility: HOSPITAL | Age: 75
End: 2020-10-15

## 2020-10-15 ENCOUNTER — APPOINTMENT (OUTPATIENT)
Dept: OTHER | Facility: HOSPITAL | Age: 75
End: 2020-10-15

## 2020-10-15 ENCOUNTER — OFFICE VISIT (OUTPATIENT)
Dept: ONCOLOGY | Facility: CLINIC | Age: 75
End: 2020-10-15

## 2020-10-15 VITALS
BODY MASS INDEX: 37.94 KG/M2 | TEMPERATURE: 98.2 F | HEART RATE: 58 BPM | SYSTOLIC BLOOD PRESSURE: 151 MMHG | WEIGHT: 227.7 LBS | HEIGHT: 65 IN | RESPIRATION RATE: 16 BRPM | DIASTOLIC BLOOD PRESSURE: 73 MMHG | OXYGEN SATURATION: 95 %

## 2020-10-15 DIAGNOSIS — D50.0 IRON DEFICIENCY ANEMIA DUE TO CHRONIC BLOOD LOSS: Primary | ICD-10-CM

## 2020-10-15 DIAGNOSIS — D50.0 IRON DEFICIENCY ANEMIA DUE TO CHRONIC BLOOD LOSS: ICD-10-CM

## 2020-10-15 LAB
ALBUMIN SERPL-MCNC: 4.2 G/DL (ref 3.5–5.2)
ALBUMIN/GLOB SERPL: 1.4 G/DL
ALP SERPL-CCNC: 72 U/L (ref 39–117)
ALT SERPL W P-5'-P-CCNC: 22 U/L (ref 1–41)
ANION GAP SERPL CALCULATED.3IONS-SCNC: 7.3 MMOL/L (ref 5–15)
AST SERPL-CCNC: 22 U/L (ref 1–40)
BASOPHILS # BLD AUTO: 0.08 10*3/MM3 (ref 0–0.2)
BASOPHILS NFR BLD AUTO: 1.2 % (ref 0–1.5)
BILIRUB SERPL-MCNC: 0.4 MG/DL (ref 0–1.2)
BUN SERPL-MCNC: 22 MG/DL (ref 8–23)
BUN/CREAT SERPL: 22.2 (ref 7–25)
CALCIUM SPEC-SCNC: 9.4 MG/DL (ref 8.6–10.5)
CHLORIDE SERPL-SCNC: 104 MMOL/L (ref 98–107)
CO2 SERPL-SCNC: 30.7 MMOL/L (ref 22–29)
CREAT SERPL-MCNC: 0.99 MG/DL (ref 0.76–1.27)
DEPRECATED RDW RBC AUTO: 53.3 FL (ref 37–54)
EOSINOPHIL # BLD AUTO: 0.19 10*3/MM3 (ref 0–0.4)
EOSINOPHIL NFR BLD AUTO: 2.8 % (ref 0.3–6.2)
ERYTHROCYTE [DISTWIDTH] IN BLOOD BY AUTOMATED COUNT: 16.2 % (ref 12.3–15.4)
FERRITIN SERPL-MCNC: 42.2 NG/ML (ref 30–400)
GFR SERPL CREATININE-BSD FRML MDRD: 74 ML/MIN/1.73
GLOBULIN UR ELPH-MCNC: 2.9 GM/DL
GLUCOSE SERPL-MCNC: 119 MG/DL (ref 65–99)
HCT VFR BLD AUTO: 43.1 % (ref 37.5–51)
HGB BLD-MCNC: 13.9 G/DL (ref 13–17.7)
HGB RETIC QN AUTO: 35.1 PG (ref 29.8–36.1)
IMM GRANULOCYTES # BLD AUTO: 0.02 10*3/MM3 (ref 0–0.05)
IMM GRANULOCYTES NFR BLD AUTO: 0.3 % (ref 0–0.5)
IMM RETICS NFR: 9.9 % (ref 3–15.8)
IRON 24H UR-MRATE: 83 MCG/DL (ref 59–158)
IRON SATN MFR SERPL: 23 % (ref 20–50)
LYMPHOCYTES # BLD AUTO: 1.88 10*3/MM3 (ref 0.7–3.1)
LYMPHOCYTES NFR BLD AUTO: 27.6 % (ref 19.6–45.3)
MCH RBC QN AUTO: 28.5 PG (ref 26.6–33)
MCHC RBC AUTO-ENTMCNC: 32.3 G/DL (ref 31.5–35.7)
MCV RBC AUTO: 88.5 FL (ref 79–97)
MONOCYTES # BLD AUTO: 0.58 10*3/MM3 (ref 0.1–0.9)
MONOCYTES NFR BLD AUTO: 8.5 % (ref 5–12)
NEUTROPHILS NFR BLD AUTO: 4.07 10*3/MM3 (ref 1.7–7)
NEUTROPHILS NFR BLD AUTO: 59.6 % (ref 42.7–76)
NRBC BLD AUTO-RTO: 0 /100 WBC (ref 0–0.2)
PLATELET # BLD AUTO: 205 10*3/MM3 (ref 140–450)
PMV BLD AUTO: 8.4 FL (ref 6–12)
POTASSIUM SERPL-SCNC: 4.3 MMOL/L (ref 3.5–5.2)
PROT SERPL-MCNC: 7.1 G/DL (ref 6–8.5)
RBC # BLD AUTO: 4.87 10*6/MM3 (ref 4.14–5.8)
RETICS # AUTO: 0.05 10*6/MM3 (ref 0.02–0.13)
RETICS/RBC NFR AUTO: 0.93 % (ref 0.7–1.9)
SODIUM SERPL-SCNC: 142 MMOL/L (ref 136–145)
TIBC SERPL-MCNC: 364 MCG/DL (ref 298–536)
TRANSFERRIN SERPL-MCNC: 244 MG/DL (ref 200–360)
WBC # BLD AUTO: 6.82 10*3/MM3 (ref 3.4–10.8)

## 2020-10-15 PROCEDURE — 99214 OFFICE O/P EST MOD 30 MIN: CPT | Performed by: INTERNAL MEDICINE

## 2020-10-15 PROCEDURE — 36415 COLL VENOUS BLD VENIPUNCTURE: CPT

## 2020-10-15 PROCEDURE — 85046 RETICYTE/HGB CONCENTRATE: CPT | Performed by: INTERNAL MEDICINE

## 2020-10-15 PROCEDURE — 80053 COMPREHEN METABOLIC PANEL: CPT | Performed by: INTERNAL MEDICINE

## 2020-10-15 PROCEDURE — 83540 ASSAY OF IRON: CPT | Performed by: INTERNAL MEDICINE

## 2020-10-15 PROCEDURE — 85025 COMPLETE CBC W/AUTO DIFF WBC: CPT | Performed by: INTERNAL MEDICINE

## 2020-10-15 PROCEDURE — 84466 ASSAY OF TRANSFERRIN: CPT | Performed by: INTERNAL MEDICINE

## 2020-10-15 PROCEDURE — 82728 ASSAY OF FERRITIN: CPT | Performed by: INTERNAL MEDICINE

## 2020-10-15 NOTE — PROGRESS NOTES
Subjective   Dick Galindo is a 75 y.o. male.  Referred by Dr. Rodriguez for evaluation and management of anemia    History of Present Illness   Mr. Dubois is a 75-year-old  male with history of hypertension, hyperlipidemia, BPH status post TURP on 7/7/2020 presents for further evaluation of anemia.  He was scheduled for the TURP procedure on 7/7/2020 at which point a CBC was checked and his hemoglobin was noted to be low at 9.2.  This prompted further work-up with vitamin B12, folic acid, iron studies.  The vitamin B12 and folic acid levels were noted to be normal.  Iron studies showed a low ferritin consistent with iron deficiency.  He was then placed on oral iron which he has been taking for the past 2 weeks.  Today a repeat CBC shows an improvement in hemoglobin to 11.2.  He denies any lightheadedness, dizziness, chest pain, dyspnea, palpitations, lower extremity edema.  He reports having lost about 10 pounds over the past 2 weeks, he attributes this to changes in his diet as well as decreased appetite since the surgery.  He denies noting any blood in his stool any melena.  Denies any hematemesis.  He has been on Prevacid for a long time now for GERD.  His last colonoscopy was in 2018 on which there was diverticulosis, polyps and internal hemorrhoids noted.  EGD was also performed in 2018 on which mildly erythematous mucosa of the esophagus was noted but no other abnormalities.  He was scheduled for a colonoscopy in February 2020 by Dr. Roper however this has been delayed because of COVID and he plans to undergo colonoscopy soon.    Interval history   presents to the clinic today for follow-up.  He is taking iron every other day.  Reports being compliant with the iron.  He reports that his energy levels are normal.  Denies any chest pain, dyspnea, lightheadedness or dizziness.  Denies any bright red blood per rectum or melena.  He is tolerating the oral iron well without any problems.  He had a CT  of the abdomen in September 2020 for follow-up on the renal cysts which were stable on imaging.  He has been trying to avoid ibuprofen as much as possible and has been using more Tylenol instead.    The following portions of the patient's history were reviewed and updated as appropriate: allergies, current medications, past family history, past medical history, past social history, past surgical history and problem list.    Past Medical History:   Diagnosis Date   • Anxiety    • Asthma    • Colon polyps    • Disease of thyroid gland    • Enlarged prostate    • High cholesterol    • History of anemia    • Hypertension    • Sleep apnea     CPAP   • Slow urinary stream         Past Surgical History:   Procedure Laterality Date   • COLONOSCOPY  approx 2012    • COLONOSCOPY N/A 2/6/2018    Procedure: COLONOSCOPY INTO CEECUM AND TERMINAL MILEUM WITH COLD BIOPSY POLYPECTOMIES & COLD BIOIPSIES;  Surgeon: Usman Roper MD;  Location: [facility] ENDOSCOPY;  Service:    • CYSTOSCOPY TRANSURETHRAL RESECTION OF PROSTATE N/A 7/6/2020    Procedure: TRANSURETHRAL RESECTION OF PROSTATE;  Surgeon: Charanjit Cameron MD;  Location: [facility] OR;  Service: Urology;  Laterality: N/A;   • ENDOSCOPY N/A 2/6/2018    Procedure: ESOPHAGOGASTRODUODENOSCOPY WITH COLD BIOPSIES;  Surgeon: Usman Roper MD;  Location: [facility] ENDOSCOPY;  Service:    • TONSILLECTOMY          Family History   Problem Relation Age of Onset   • Stroke Father    • Heart attack Father    • No Known Problems Maternal Grandmother    • No Known Problems Maternal Grandfather    • No Known Problems Paternal Grandmother    • No Known Problems Paternal Grandfather    • Malig Hyperthermia Neg Hx         Social History     Socioeconomic History   • Marital status:      Spouse name: Not on file   • Number of children: Not on file   • Years of education: Not on file   • Highest education level: Not on file   Tobacco Use   • Smoking status: Former Smoker   • Smokeless  "tobacco: Never Used   • Tobacco comment: He smoked between the ages of 18 and 32.   Substance and Sexual Activity   • Alcohol use: Yes     Alcohol/week: 1.0 standard drinks     Types: 1 Glasses of wine per week     Comment: \"not heavy\"   • Drug use: No             Allergies   Allergen Reactions   • Amoxicillin Itching   • Levaquin [Levofloxacin] Nausea And Vomiting            Review of Systems   Constitutional: Negative for activity change, appetite change, chills, diaphoresis, fatigue, fever, unexpected weight gain and unexpected weight loss.   HENT: Negative for congestion, dental problem, drooling, ear discharge, ear pain, facial swelling, hearing loss, mouth sores, nosebleeds, postnasal drip, rhinorrhea, sinus pressure, sneezing, sore throat, swollen glands, tinnitus, trouble swallowing and voice change.    Eyes: Negative for blurred vision, double vision, photophobia, pain, discharge, redness, itching and visual disturbance.   Respiratory: Negative for apnea, cough, choking, chest tightness, shortness of breath, wheezing and stridor.    Cardiovascular: Negative for chest pain, palpitations and leg swelling.   Gastrointestinal: Positive for GERD. Negative for abdominal distention, abdominal pain, anal bleeding, blood in stool, constipation, diarrhea, nausea, rectal pain, vomiting and indigestion.   Endocrine: Negative for cold intolerance, heat intolerance, polydipsia, polyphagia and polyuria.   Genitourinary: Negative for decreased urine volume, difficulty urinating, discharge, dysuria, flank pain, frequency, genital sores, hematuria, nocturia, erectile dysfunction and urgency.   Musculoskeletal: Negative for arthralgias, back pain, gait problem, joint swelling, myalgias, neck pain, neck stiffness and bursitis.   Skin: Negative for color change, dry skin, pallor, rash, skin lesions and bruise.   Allergic/Immunologic: Negative for environmental allergies, food allergies and immunocompromised state. " "  Neurological: Negative for dizziness, tremors, seizures, syncope, facial asymmetry, speech difficulty, weakness, light-headedness, numbness, headache, memory problem and confusion.   Hematological: Negative for adenopathy. Does not bruise/bleed easily.   Psychiatric/Behavioral: Negative for agitation, behavioral problems, decreased concentration, dysphoric mood, hallucinations, self-injury, sleep disturbance, suicidal ideas, negative for hyperactivity, depressed mood and stress. The patient is not nervous/anxious.          Objective   Blood pressure 151/73, pulse 58, temperature 98.2 °F (36.8 °C), resp. rate 16, height 164.6 cm (64.8\"), weight 103 kg (227 lb 11.2 oz), SpO2 95 %.   Physical Exam   Constitutional: He is oriented to person, place, and time. He appears well-developed and well-nourished. No distress.   HENT:   Head: Normocephalic and atraumatic.   Right Ear: External ear normal.   Left Ear: External ear normal.   Nose: Nose normal.   Mouth/Throat: Oropharynx is clear and moist. No oropharyngeal exudate.   Eyes: Pupils are equal, round, and reactive to light. Conjunctivae are normal. Right eye exhibits no discharge. Left eye exhibits no discharge. No scleral icterus.   Neck: Normal range of motion. Neck supple. No JVD present. No tracheal deviation present. No thyromegaly present.   Cardiovascular: Normal rate, regular rhythm and normal heart sounds. Exam reveals no gallop and no friction rub.   No murmur heard.  Pulmonary/Chest: Effort normal and breath sounds normal. No stridor. No respiratory distress. He has no wheezes. He has no rales. He exhibits no tenderness.   Abdominal: Soft. Bowel sounds are normal. He exhibits no distension and no mass. There is no abdominal tenderness. There is no rebound and no guarding.   Musculoskeletal: Normal range of motion. No tenderness or deformity.   Lymphadenopathy:     He has no cervical adenopathy.   Neurological: He is alert and oriented to person, place, and " time. No cranial nerve deficit. Coordination normal.   Skin: Skin is warm and dry. No rash noted. He is not diaphoretic. No erythema. No pallor.   Psychiatric: His behavior is normal. Judgment and thought content normal.   Vitals reviewed.    I have reexamined the patient and the results are consistent with the previously documented exam. Harika Hein MD     Lab on 10/15/2020   Component Date Value Ref Range Status   • Immature Reticulocyte Fraction 10/15/2020 9.9  3.0 - 15.8 % Final   • Reticulocyte % 10/15/2020 0.93  0.70 - 1.90 % Final   • Reticulocyte Absolute 10/15/2020 0.0453  0.0200 - 0.1300 10*6/mm3 Final   • Reticulocyte Hgb 10/15/2020 35.1  29.8 - 36.1 pg Final   • WBC 10/15/2020 6.82  3.40 - 10.80 10*3/mm3 Final   • RBC 10/15/2020 4.87  4.14 - 5.80 10*6/mm3 Final   • Hemoglobin 10/15/2020 13.9  13.0 - 17.7 g/dL Final   • Hematocrit 10/15/2020 43.1  37.5 - 51.0 % Final   • MCV 10/15/2020 88.5  79.0 - 97.0 fL Final   • MCH 10/15/2020 28.5  26.6 - 33.0 pg Final   • MCHC 10/15/2020 32.3  31.5 - 35.7 g/dL Final   • RDW 10/15/2020 16.2* 12.3 - 15.4 % Final   • RDW-SD 10/15/2020 53.3  37.0 - 54.0 fl Final   • MPV 10/15/2020 8.4  6.0 - 12.0 fL Final   • Platelets 10/15/2020 205  140 - 450 10*3/mm3 Final   • Neutrophil % 10/15/2020 59.6  42.7 - 76.0 % Final   • Lymphocyte % 10/15/2020 27.6  19.6 - 45.3 % Final   • Monocyte % 10/15/2020 8.5  5.0 - 12.0 % Final   • Eosinophil % 10/15/2020 2.8  0.3 - 6.2 % Final   • Basophil % 10/15/2020 1.2  0.0 - 1.5 % Final   • Immature Grans % 10/15/2020 0.3  0.0 - 0.5 % Final   • Neutrophils, Absolute 10/15/2020 4.07  1.70 - 7.00 10*3/mm3 Final   • Lymphocytes, Absolute 10/15/2020 1.88  0.70 - 3.10 10*3/mm3 Final   • Monocytes, Absolute 10/15/2020 0.58  0.10 - 0.90 10*3/mm3 Final   • Eosinophils, Absolute 10/15/2020 0.19  0.00 - 0.40 10*3/mm3 Final   • Basophils, Absolute 10/15/2020 0.08  0.00 - 0.20 10*3/mm3 Final   • Immature Grans, Absolute 10/15/2020 0.02  0.00 - 0.05  10*3/mm3 Final   • nRBC 10/15/2020 0.0  0.0 - 0.2 /100 WBC Final   Hospital Outpatient Visit on 09/21/2020   Component Date Value Ref Range Status   • Creatinine 09/21/2020 0.90  0.60 - 1.30 mg/dL Final    Serial Number: [id number]:  [id number]        Ct Kidneys With & Without Contrast    Result Date: 9/21/2020  Stable exam with numerous bilateral renal cysts. Some of them contain a few calcified septations. One of them on the left contains minimal wall thickening as described above. Recommend another follow-up in 1 year to ensure stability.  Radiation dose reduction techniques were utilized, including automated exposure control and exposure modulation based on body size.  This report was finalized on 9/21/2020 9:56 AM by Dr. Cy Cameron M.D.         CBC 10/15/2020 reviewed and noted to have a WBC count of 6.82, hemoglobin 13.9, platelet count 205,000    Ferritin today normal at 42.2  Iron profile iron -saturation 23%  Transferrin 244  TIBC 364  Iron 83    Assessment/Plan      Mr. Holder is a 75-year-old  male referred for further work-up of anemia.  On reviewing the labs he is noted to have low hemoglobin since May 2019.  Ferritin was normal in the past however most recent ferritin from 7/8/2020 noted to be low at 12.  This is indicative of iron deficiency.  He has been on oral iron and has had a good response.    1.anemia  Work-up consistent with iron deficiency   Hemoglobin and iron studies improved and normal today.  Continue oral iron every other day  Follow-up with gastroenterology to complete a GI work-up to determine the source of bleed or reason for iron deficiency anemia  Follow-up in 6 months with CBC, iron studies    2.hypertension-blood pressure well controlled, continue current medication    3.GI work-up for iron deficiency anemia    4.hyperlipidemia , continue atorvastatin    5.mood-continue Zoloft, stable    6.follow-up-6 months with labs

## 2020-10-19 ENCOUNTER — TREATMENT (OUTPATIENT)
Dept: PHYSICAL THERAPY | Facility: CLINIC | Age: 75
End: 2020-10-19

## 2020-10-19 DIAGNOSIS — M48.061 SPINAL STENOSIS OF LUMBAR REGION WITHOUT NEUROGENIC CLAUDICATION: Primary | ICD-10-CM

## 2020-10-19 DIAGNOSIS — M54.40 ACUTE LEFT-SIDED LOW BACK PAIN WITH SCIATICA, SCIATICA LATERALITY UNSPECIFIED: ICD-10-CM

## 2020-10-19 PROCEDURE — 97110 THERAPEUTIC EXERCISES: CPT | Performed by: PHYSICAL THERAPIST

## 2020-10-19 PROCEDURE — 97012 MECHANICAL TRACTION THERAPY: CPT | Performed by: PHYSICAL THERAPIST

## 2020-10-19 PROCEDURE — 97161 PT EVAL LOW COMPLEX 20 MIN: CPT | Performed by: PHYSICAL THERAPIST

## 2020-10-19 NOTE — PROGRESS NOTES
Physical Therapy Initial Evaluation and Plan of Care      Patient: Dick Galindo   : 1945  Diagnosis/ICD-10 Code:  No primary diagnosis found.  Referring practitioner: Mynor Russ MD  Date of Initial Visit: 10/19/2020  Today's Date: 10/19/2020  Patient seen for Visit count could not be calculated. Make sure you are using a visit which is associated with an episode. sessions           Subjective Evaluation    History of Present Illness  Mechanism of injury: About two weeks ago, turned and it was like R leg went out. Usually pain in L leg.Was severe for a bit but better now though not to baseline.  MRI in past showed stenosis, bulging and ? Herniation, 15-20 year ago. Current MD said PT first, then more testing. Most pain with standing or slightly bent forward. 2-3 min standing tolerance. Relieved with sleep and sitting. Can't walk longer than 1/4 mile.  But want to be able to walk for exercise. Quit going to [facility] due to Covid. Has treadmill and can do that some but is still painful. Carrying items is worse on back too. Does feel intermittent weakness B. Has had PT in past twice and had good results. 1.5 years ago, strictly exercise. Hasn't kept up the HEP a ton, does some stretches from chiro.     Subjective comment: Back pain for about 20 years ago. Off and on symptoms. Numbness in L foot and ankle, more than in the past. Numbing/tingling. Pain in low back and radiates to buttocks to legs past knee especially on L side. No change with valsalva.   Patient Occupation: Retired  Quality of life: good    Pain  Current pain ratin  At best pain ratin  At worst pain ratin  Relieving factors: change in position, relaxation, rest, heat and medications  Aggravating factors: standing and ambulation    Social Support  Lives in: multiple-level home  Lives with: spouse    Patient Goals  Patient goals for therapy: independence with ADLs/IADLs, return to sport/leisure activities and decreased pain              Objective          Tenderness     Additional Tenderness Details  Hypomobile lower thoracic, more mobile lumbar but painful L3-S1    Neurological Testing     Sensation     Lumbar   Left   Intact: light touch    Right   Intact: light touch    Active Range of Motion     Lumbar   Flexion: WFL  Left lateral flexion: WFL  Right lateral flexion: WFL  Left rotation: WFL  Right rotation: WFL    Additional Active Range of Motion Details  Mildly decreased B lumbar rotation and side bending 50% of WNL for age extension    Strength/Myotome Testing     Lumbar   Left   Normal strength    Right   Normal strength    Tests     Lumbar     Left   Negative passive SLR.     Right   Positive passive SLR.     Additional Tests Details  SLR on R positive for pain  (+) LAD on R and L for decreased pain    Lumbar Flexibility Comments:   Moderately decreased B hamstring flexibility         See Exercise, Manual, and Modality Logs for complete treatment.   Functional outcome score:44% on Oswestry            Assessment & Plan     Assessment  Impairments: abnormal or restricted ROM, activity intolerance, lacks appropriate home exercise program and pain with function  Assessment details: Dick Galindo is a 75 y.o. year-old male referred to physical therapy for lumbar stenosis exacerbation. He presents with a evolving clinical presentation.  He has comorbidities lumbar stenosis and obesity and no personal factors that may affect his progress in the plan of care.  Signs and symptoms are consistent with physical therapy diagnosis of lumbar stenosis exacerbation.     Prognosis: good  Functional Limitations: carrying objects, lifting, walking, uncomfortable because of pain and standing  Goals  Plan Goals: STGs to be met by 2 weeks  Pt will be independent and compliant with initial home exercise program.   Pt will report low back and LE pain </= 5/10 to increase ease of preparing a meal.      LTGs to be met by 4 weeks  Pt will be independent and  compliant with advanced home exercise program.   Pt will report low back and LE pain </= 3-4/10 to increase ease of grocery shopping.  Pt will score 34% on Oswestry indicating decreased perceived functional disability.   Pt will verbalize plan of safe and healthy cardiovascular exercise that will not exacerbate his symptom.     Plan  Therapy options: will be seen for skilled physical therapy services  Planned modality interventions: TENS, thermotherapy (hydrocollator packs), traction, ultrasound, high voltage pulsed current (pain management), electrical stimulation/Russian stimulation, dry needling and cryotherapy  Planned therapy interventions: manual therapy, neuromuscular re-education, postural training, soft tissue mobilization, spinal/joint mobilization, strengthening, therapeutic activities, joint mobilization, IADL retraining, home exercise program, gait training, functional ROM exercises, flexibility and abdominal trunk stabilization  Frequency: 2x week  Duration in visits: 12  Duration in weeks: 20  Plan details: Assess response to traction, continue. Develop and progress flexion-focused HEP with core stab.         Timed:  Manual Therapy:         mins  79358;  Therapeutic Exercise:    16     mins  15921;     Neuromuscular Kaitlyn:        mins  65284;    Therapeutic Activity:          mins  45949;     Gait Training:           mins  25771;     Ultrasound:          mins  86051;    Electrical Stimulation:         mins  36272 ( );  Iontophoresis         mins 51281  Dry Needling        mins      Untimed:  Electrical Stimulation:         mins  79645 ( );  Mechanical Traction:    15     mins  55558;     Timed Treatment:   16   mins   Total Treatment:     53   mins    PT SIGNATURE: Jannie Hines, PT   DATE TREATMENT INITIATED: 10/19/2020    Initial Certification  Certification Period: 1/17/2021  I certify that the therapy services are furnished while this patient is under my care.  The services  outlined above are required by this patient, and will be reviewed every 90 days.     PHYSICIAN: Mynor Russ MD      DATE:     Please sign and return via fax to [phone] Thank you, [facility].

## 2020-10-20 RX ORDER — AMLODIPINE BESYLATE 10 MG/1
10 TABLET ORAL DAILY
Qty: 90 TABLET | Refills: 1 | Status: SHIPPED | OUTPATIENT
Start: 2020-10-20 | End: 2021-05-15 | Stop reason: SDUPTHER

## 2020-10-20 RX ORDER — LISINOPRIL 40 MG/1
40 TABLET ORAL DAILY
Qty: 90 TABLET | Refills: 1 | Status: SHIPPED | OUTPATIENT
Start: 2020-10-20 | End: 2021-05-15 | Stop reason: SDUPTHER

## 2020-10-20 RX ORDER — ATORVASTATIN CALCIUM 40 MG/1
40 TABLET, FILM COATED ORAL DAILY
Qty: 90 TABLET | Refills: 1 | Status: SHIPPED | OUTPATIENT
Start: 2020-10-20 | End: 2021-05-15 | Stop reason: SDUPTHER

## 2020-10-20 RX ORDER — CARVEDILOL 6.25 MG/1
6.25 TABLET ORAL 2 TIMES DAILY WITH MEALS
Qty: 180 TABLET | Refills: 1 | Status: SHIPPED | OUTPATIENT
Start: 2020-10-20 | End: 2021-05-15 | Stop reason: SDUPTHER

## 2020-10-20 RX ORDER — LEVOTHYROXINE SODIUM 0.05 MG/1
50 TABLET ORAL DAILY
Qty: 90 TABLET | Refills: 1 | Status: SHIPPED | OUTPATIENT
Start: 2020-10-20 | End: 2021-05-15 | Stop reason: SDUPTHER

## 2020-10-21 ENCOUNTER — TREATMENT (OUTPATIENT)
Dept: PHYSICAL THERAPY | Facility: CLINIC | Age: 75
End: 2020-10-21

## 2020-10-21 DIAGNOSIS — M54.40 ACUTE LEFT-SIDED LOW BACK PAIN WITH SCIATICA, SCIATICA LATERALITY UNSPECIFIED: ICD-10-CM

## 2020-10-21 DIAGNOSIS — M48.061 SPINAL STENOSIS OF LUMBAR REGION WITHOUT NEUROGENIC CLAUDICATION: Primary | ICD-10-CM

## 2020-10-21 PROCEDURE — 97012 MECHANICAL TRACTION THERAPY: CPT | Performed by: PHYSICAL THERAPIST

## 2020-10-21 PROCEDURE — 97110 THERAPEUTIC EXERCISES: CPT | Performed by: PHYSICAL THERAPIST

## 2020-10-21 NOTE — PROGRESS NOTES
Physical Therapy Daily Progress Note    Patient: Dick Galindo   : 1945  Diagnosis/ICD-10 Code:  Spinal stenosis of lumbar region without neurogenic claudication [M48.061]  Referring practitioner: Mynor Russ MD  Date of Initial Visit: Type: THERAPY  Noted: 10/19/2020  Today's Date: 10/21/2020  Patient seen for 2 sessions           Subjective Doing well. No issues after last time. I went to vote and that much standing and walking did cause pain but less than usual. -6/10     Objective   See Exercise, Manual, and Modality Logs for complete treatment.       Assessment/Plan  Progressed core stab and traction today. Pt with less pain that usual after standing/walking to vote.            Timed:    Manual Therapy:         mins  97143;  Therapeutic Exercise:    24     mins  97929;     Neuromuscular Kaitlyn:        mins  40340;    Therapeutic Activity:          mins  48209;     Gait Training:           mins  12995;     Ultrasound:          mins  87896;    Electrical Stimulation:         mins  07542 (MC );  Iontophoresis         mins 90703;  Aquatic Therapy         mins 17020;  Dry Needling                   mins    Untimed:  Electrical Stimulation:         mins  55310 (MC );  Mechanical Traction:    15     mins  42538;     Timed Treatment:   24    mins   Total Treatment:     43   mins  Jannie Hines, PT  Physical Therapist

## 2020-10-26 ENCOUNTER — TREATMENT (OUTPATIENT)
Dept: PHYSICAL THERAPY | Facility: CLINIC | Age: 75
End: 2020-10-26

## 2020-10-26 DIAGNOSIS — M48.061 SPINAL STENOSIS OF LUMBAR REGION WITHOUT NEUROGENIC CLAUDICATION: Primary | ICD-10-CM

## 2020-10-26 DIAGNOSIS — M54.40 ACUTE LEFT-SIDED LOW BACK PAIN WITH SCIATICA, SCIATICA LATERALITY UNSPECIFIED: ICD-10-CM

## 2020-10-26 PROCEDURE — 97110 THERAPEUTIC EXERCISES: CPT | Performed by: PHYSICAL THERAPIST

## 2020-10-26 PROCEDURE — 97140 MANUAL THERAPY 1/> REGIONS: CPT | Performed by: PHYSICAL THERAPIST

## 2020-10-26 PROCEDURE — 97012 MECHANICAL TRACTION THERAPY: CPT | Performed by: PHYSICAL THERAPIST

## 2020-10-26 NOTE — PROGRESS NOTES
Physical Therapy Daily Progress Note  Visit: 3    Subjective Edward Mateo reports: continued radicular symptoms in L LE to foot.     Objective   See Exercise, Manual, and Modality Logs for complete treatment.     Assessment/Plan: Compliant/cooperative with current rehab efforts.  Benefits from verbal/tactile cues to ensure correct exercise performance/technique, hold time and position. Plan details: Progress ROM / strengthening / stabilization / functional activity as tolerated     Manual Therapy:    10      mins  56060;  Therapeutic Exercise:     20     mins  16548;     Neuromuscular Kaitlyn:         mins  53737;    Therapeutic Activity:           mins  64679;     Gait Training:            mins  29569;     Ultrasound:           mins  11728;    Electrical Stimulation:         mins  07845 ( );  Dry Needling           mins self-pay  Traction     15      mins 17019  Canalith Repositioning        mins 86931      Timed Treatment:  30    mins   Total Treatment:    45    mins    RASHAD Kurtz License #: [id number]    Physical Therapist

## 2020-10-28 ENCOUNTER — TREATMENT (OUTPATIENT)
Dept: PHYSICAL THERAPY | Facility: CLINIC | Age: 75
End: 2020-10-28

## 2020-10-28 DIAGNOSIS — M48.061 SPINAL STENOSIS OF LUMBAR REGION WITHOUT NEUROGENIC CLAUDICATION: Primary | ICD-10-CM

## 2020-10-28 DIAGNOSIS — M54.40 ACUTE LEFT-SIDED LOW BACK PAIN WITH SCIATICA, SCIATICA LATERALITY UNSPECIFIED: ICD-10-CM

## 2020-10-28 PROCEDURE — 97140 MANUAL THERAPY 1/> REGIONS: CPT | Performed by: PHYSICAL THERAPIST

## 2020-10-28 PROCEDURE — 97012 MECHANICAL TRACTION THERAPY: CPT | Performed by: PHYSICAL THERAPIST

## 2020-10-28 NOTE — PROGRESS NOTES
Physical Therapy Daily Progress Note  Visit: 4    Subjective Edward Mateo reports: he felt better after the manual stretching and lumbar traction last visit but pain returns with walking and standing     Objective   See Exercise, Manual, and Modality Logs for complete treatment.     Assessment/Plan: Responding well to treatment. Plan details: Progress ROM / strengthening / stabilization / functional activity as tolerated     Manual Therapy:   25      mins  75307;  Therapeutic Exercise:    5      mins  17574;     Neuromuscular Kaitlyn:         mins  37008;    Therapeutic Activity:           mins  76346;     Gait Training:            mins  48694;     Ultrasound:           mins  73365;    Electrical Stimulation:          mins  70216 ( );  Dry Needling           mins self-pay  Traction     15      mins 72785  Canalith Repositioning         mins 38620      Timed Treatment:   30  mins   Total Treatment:   45     mins    RASHAD Kurtz License #: [id number]    Physical Therapist

## 2020-11-02 ENCOUNTER — TREATMENT (OUTPATIENT)
Dept: PHYSICAL THERAPY | Facility: CLINIC | Age: 75
End: 2020-11-02

## 2020-11-02 DIAGNOSIS — M54.40 ACUTE LEFT-SIDED LOW BACK PAIN WITH SCIATICA, SCIATICA LATERALITY UNSPECIFIED: ICD-10-CM

## 2020-11-02 DIAGNOSIS — M48.061 SPINAL STENOSIS OF LUMBAR REGION WITHOUT NEUROGENIC CLAUDICATION: Primary | ICD-10-CM

## 2020-11-02 PROCEDURE — 97110 THERAPEUTIC EXERCISES: CPT | Performed by: PHYSICAL THERAPIST

## 2020-11-02 PROCEDURE — 97140 MANUAL THERAPY 1/> REGIONS: CPT | Performed by: PHYSICAL THERAPIST

## 2020-11-02 NOTE — PROGRESS NOTES
Physical Therapy Daily Progress Note      Patient: Dick Galindo   : 1945  Referring practitioner: Mynor Russ MD  Date of Initial Visit: Type: THERAPY  Noted: 10/19/2020  Today's Date: 2020  Patient seen for 5 sessions         Dick Galindo reports: that he's still having pain into his left foot. States that he is interested in dry needling.       Objective/ Treatment: Patient able to achieve full lumbar flexion without an increase in symptoms. Patient exhibits limited active lumbar extension. Significant pain with palpation of the left L4-5 transverse process with guarding of the erector spinae bilaterally. Decreased intervertebral movement throughout the entire thoracolumbar spine. Flat back posture in standing. No change in radicular symptoms with repeated extensions or flexions. Slight reduction in pain with left lumbar sustained gapping. No tenderness with palpation of the posterior hip musculature bilaterally. Positive left sided slump test. See manual therapy and exercise logs for more details.     Education: Positional distraction       Assessment: The patient tolerated today's session well, but still exhibits radicular symptoms in the left lower extremity. Symptoms improve somewhat with gapping of the left lumbar spine. Minimal cues required to perform posterior pelvic tilts in supine and hookyling. Patient tends to co-contract the surrounding muscles, which is likely contributing to muscular guarding. Patient ended today's session with a significant reduction in radicular symptoms. Patient would benefit from additional skilled physical therapy to improve core strength and decrease neural tension in order to improve walking and standing tolerance.         Plan:  Progress per Plan of Care             Timed:         Manual Therapy:   12      mins  75486;     Therapeutic Exercise:   33      mins  49245;     Neuromuscular Kaitlyn:        mins  70860;    Therapeutic Activity:          mins   05933;     Gait Training:           mins  46929;     Ultrasound:          mins  15837;    Ionto                                   mins   29940  Self Care                            mins   29367      Un-Timed:  Electrical Stimulation:         mins  95037 ( );  Dry Needling          mins self-pay  Traction          mins 46326  Low Eval          Mins  94659  Mod Eval          Mins  59227  High Eval                            Mins  85042  Canalith Repos                   mins  88030    Timed Treatment:  45    mins   Total Treatment:    46    mins    Cindy De León PT  Physical Therapist

## 2020-11-04 ENCOUNTER — TREATMENT (OUTPATIENT)
Dept: PHYSICAL THERAPY | Facility: CLINIC | Age: 75
End: 2020-11-04

## 2020-11-04 DIAGNOSIS — M48.061 SPINAL STENOSIS OF LUMBAR REGION WITHOUT NEUROGENIC CLAUDICATION: Primary | ICD-10-CM

## 2020-11-04 DIAGNOSIS — M54.40 ACUTE LEFT-SIDED LOW BACK PAIN WITH SCIATICA, SCIATICA LATERALITY UNSPECIFIED: ICD-10-CM

## 2020-11-04 PROCEDURE — 97140 MANUAL THERAPY 1/> REGIONS: CPT | Performed by: PHYSICAL THERAPIST

## 2020-11-04 PROCEDURE — 97110 THERAPEUTIC EXERCISES: CPT | Performed by: PHYSICAL THERAPIST

## 2020-11-04 PROCEDURE — 97012 MECHANICAL TRACTION THERAPY: CPT | Performed by: PHYSICAL THERAPIST

## 2020-11-04 NOTE — PROGRESS NOTES
Physical Therapy Daily Progress Note  Visit: 6    Subjective Edward Mateo reports: continue pain in L foot and L hip and foot feel tight. Reports traction seems to help     Objective   See Exercise, Manual, and Modality Logs for complete treatment.     Assessment/Plan: Compliant/cooperative with current rehab efforts.  Benefits from verbal/tactile cues to ensure correct exercise performance/technique, hold time and position. Plan details: Progress ROM / strengthening / stabilization / functional activity as tolerated     Manual Therapy:       10   mins  60870;  Therapeutic Exercise:     15     mins  89086;     Neuromuscular Kaitlyn:         mins  49494;    Therapeutic Activity:           mins  05277;     Gait Training:            mins  96417;     Ultrasound:           mins  13172;    Electrical Stimulation:          mins  84988 ( );  Dry Needling           mins self-pay  Traction    15       mins 61944  Canalith Repositioning         mins 42437      Timed Treatment:  25   mins   Total Treatment:       40 mins    RASHAD Kurtz License #: [id number]    Physical Therapist

## 2020-11-09 ENCOUNTER — TREATMENT (OUTPATIENT)
Dept: PHYSICAL THERAPY | Facility: CLINIC | Age: 75
End: 2020-11-09

## 2020-11-09 DIAGNOSIS — M54.40 ACUTE LEFT-SIDED LOW BACK PAIN WITH SCIATICA, SCIATICA LATERALITY UNSPECIFIED: ICD-10-CM

## 2020-11-09 DIAGNOSIS — M48.061 SPINAL STENOSIS OF LUMBAR REGION WITHOUT NEUROGENIC CLAUDICATION: Primary | ICD-10-CM

## 2020-11-09 PROBLEM — K63.5 COLON POLYP: Status: ACTIVE | Noted: 2020-11-09

## 2020-11-09 PROCEDURE — 97110 THERAPEUTIC EXERCISES: CPT | Performed by: PHYSICAL THERAPIST

## 2020-11-09 PROCEDURE — 97012 MECHANICAL TRACTION THERAPY: CPT | Performed by: PHYSICAL THERAPIST

## 2020-11-09 NOTE — PROGRESS NOTES
Physical Therapy Daily Progress Note  Visit: 7    Subjective Dick Galindo reports: he feels like his back is improving     Objective   See Exercise, Manual, and Modality Logs for complete treatment.     Assessment/Plan: good response to treatment. Plan details: Progress ROM / strengthening / stabilization / functional activity as tolerated     Manual Therapy:          mins  74855;  Therapeutic Exercise:    30      mins  78452;     Neuromuscular Kaitlyn:         mins  06047;    Therapeutic Activity:           mins  27039;     Gait Training:            mins  39165;     Ultrasound:           mins  78964;    Electrical Stimulation:          mins  95900 ( );  Dry Needling           mins self-pay  Traction    15       mins 62838  Canalith Repositioning         mins 00229      Timed Treatment:  30    mins   Total Treatment:    45    mins    Monserrat Zaragoza, PT  [place] License #: [id number]    Physical Therapist

## 2020-11-10 ENCOUNTER — TRANSCRIBE ORDERS (OUTPATIENT)
Dept: ADMINISTRATIVE | Facility: HOSPITAL | Age: 75
End: 2020-11-10

## 2020-11-10 DIAGNOSIS — Z01.818 OTHER SPECIFIED PRE-OPERATIVE EXAMINATION: Primary | ICD-10-CM

## 2020-11-11 ENCOUNTER — TREATMENT (OUTPATIENT)
Dept: PHYSICAL THERAPY | Facility: CLINIC | Age: 75
End: 2020-11-11

## 2020-11-11 DIAGNOSIS — M54.40 ACUTE LEFT-SIDED LOW BACK PAIN WITH SCIATICA, SCIATICA LATERALITY UNSPECIFIED: ICD-10-CM

## 2020-11-11 DIAGNOSIS — M48.061 SPINAL STENOSIS OF LUMBAR REGION WITHOUT NEUROGENIC CLAUDICATION: Primary | ICD-10-CM

## 2020-11-11 PROCEDURE — 97110 THERAPEUTIC EXERCISES: CPT | Performed by: PHYSICAL THERAPIST

## 2020-11-11 PROCEDURE — 97012 MECHANICAL TRACTION THERAPY: CPT | Performed by: PHYSICAL THERAPIST

## 2020-11-11 NOTE — PROGRESS NOTES
Physical Therapy Daily Progress Note  Visit: 8    Subjective Dick Galindo reports: his back is feeling better, feels like treatment is being effective.     Objective   See Exercise, Manual, and Modality Logs for complete treatment.     Assessment/Plan:  Responding well to treatment. Plan details: Progress ROM / strengthening / stabilization / functional activity as tolerated     Manual Therapy:          mins  79892;  Therapeutic Exercise:   30       mins  10088;     Neuromuscular Kaitlyn:         mins  33685;    Therapeutic Activity:           mins  13202;     Gait Training:            mins  66681;     Ultrasound:           mins  93086;    Electrical Stimulation:          mins  58231 ( );  Dry Needling           mins self-pay  Traction    15       mins 49008  Canalith Repositioning         mins 89701      Timed Treatment:   30   mins   Total Treatment:    45    mins    Monserrat Zaragoza PT  [place] License #: [id number]    Physical Therapist

## 2020-11-16 ENCOUNTER — TREATMENT (OUTPATIENT)
Dept: PHYSICAL THERAPY | Facility: CLINIC | Age: 75
End: 2020-11-16

## 2020-11-16 DIAGNOSIS — M48.061 SPINAL STENOSIS OF LUMBAR REGION WITHOUT NEUROGENIC CLAUDICATION: Primary | ICD-10-CM

## 2020-11-16 DIAGNOSIS — M54.40 ACUTE LEFT-SIDED LOW BACK PAIN WITH SCIATICA, SCIATICA LATERALITY UNSPECIFIED: ICD-10-CM

## 2020-11-16 PROCEDURE — 97012 MECHANICAL TRACTION THERAPY: CPT | Performed by: PHYSICAL THERAPIST

## 2020-11-16 PROCEDURE — 97112 NEUROMUSCULAR REEDUCATION: CPT | Performed by: PHYSICAL THERAPIST

## 2020-11-16 NOTE — PROGRESS NOTES
Physical Therapy Daily Progress Note      Patient: Dick Galindo   : 1945  Referring practitioner: Mynor Russ MD  Date of Initial Visit: Type: THERAPY  Noted: 10/19/2020  Today's Date: 2020  Patient seen for 9 sessions       Dick Galindo reports: that he feels like traction is helping. Had an episode the other day when his foot started bothering him when he was walking. States that he is still having pain when he bends over.       Objective/ Treatment: Patient tolerant to 115/110 no progression lumbar traction without complaint.     Education: Extensive time spent educating the patient on performing a proper posterior pelvic tilt during functional activities, ways to reduce inflammation      Assessment: The patient tolerated today's session well without an increase in pain. While the patient is able to perform a fair posterior pelvic tilt in supine and sitting, the patient has significant difficulty maintaining appropriate pelvic tilt during transitional movements and in standing, likely contributing to discomfort with prolonged walking. Utilized a blood pressure cuff for biofeedback today, but it did not work well as the patient could not see out of his bifocals easily. He continues to require skilled physical therapy to strengthen his abdominals (patient has a significant diastasis recti) and improve pelvic control for prolonged walking.        Plan:  Progress per Plan of Care             Timed:         Manual Therapy:         mins  27818;     Therapeutic Exercise:    2     mins  92408;     Neuromuscular Kaitlyn:  28      mins  01743;    Therapeutic Activity:          mins  96141;     Gait Training:           mins  25112;     Ultrasound:          mins  65304;    Ionto                                   mins   74146  Self Care                            mins   17646      Un-Timed:  Electrical Stimulation:         mins  34347 ( );  Dry Needling          mins self-pay  Traction    15       mins 75381  Low Eval          Mins  29115  Mod Eval          Mins  51939  High Eval                            Mins  38970  Canalith Repos                   mins  93699    Timed Treatment:  46    mins   Total Treatment:    48    mins    Cindy De León PT  Physical Therapist

## 2020-11-18 ENCOUNTER — TREATMENT (OUTPATIENT)
Dept: PHYSICAL THERAPY | Facility: CLINIC | Age: 75
End: 2020-11-18

## 2020-11-18 DIAGNOSIS — M48.061 SPINAL STENOSIS OF LUMBAR REGION WITHOUT NEUROGENIC CLAUDICATION: Primary | ICD-10-CM

## 2020-11-18 DIAGNOSIS — M54.40 ACUTE LEFT-SIDED LOW BACK PAIN WITH SCIATICA, SCIATICA LATERALITY UNSPECIFIED: ICD-10-CM

## 2020-11-18 PROCEDURE — 97140 MANUAL THERAPY 1/> REGIONS: CPT | Performed by: PHYSICAL THERAPIST

## 2020-11-18 PROCEDURE — 97530 THERAPEUTIC ACTIVITIES: CPT | Performed by: PHYSICAL THERAPIST

## 2020-11-18 PROCEDURE — 97110 THERAPEUTIC EXERCISES: CPT | Performed by: PHYSICAL THERAPIST

## 2020-11-18 NOTE — PROGRESS NOTES
Physical Therapy Daily Progress Note        Tremaineterrence Mateo reports: traction helping.  Exercises  helping    Subjective     Objective   See Exercise, Manual, and Modality Logs for complete treatment.   -posture/postural awareness with regards to lumbar spine  -lumbar support  -TA awareness,  Utilization with transitional movements/position changes  Log roll    Assessment/Plan  Good response to mechanical traction in regards to decreased lumbar pain/discomfort.  Benefits from verbal/tactile cues to ensure proper exercise performance/positioning and technique.    Other  Re assess next visit.  Check ROM, mm strength, goals. Update outcome meaure           Manual Therapy:         mins  05650;  Therapeutic Exercise:    20     mins  98481;     Neuromuscular Kaitlyn:        mins  56792;    Therapeutic Activity:    10    mins  14451;     Mechanical Traction:    15      mins  59192  Ultrasound:          mins  32812;    Electrical Stimulation:         mins  10350 ( );      Timed Treatment:   30   mins   Total Treatment:     45   mins    Seven Larry, PTA    Physical Therapist Assistant [place] 0176

## 2020-11-23 ENCOUNTER — TREATMENT (OUTPATIENT)
Dept: PHYSICAL THERAPY | Facility: CLINIC | Age: 75
End: 2020-11-23

## 2020-11-23 DIAGNOSIS — E03.8 SUBCLINICAL HYPOTHYROIDISM: ICD-10-CM

## 2020-11-23 DIAGNOSIS — D64.9 ANEMIA, UNSPECIFIED TYPE: Primary | ICD-10-CM

## 2020-11-23 DIAGNOSIS — M48.061 SPINAL STENOSIS OF LUMBAR REGION WITHOUT NEUROGENIC CLAUDICATION: Primary | ICD-10-CM

## 2020-11-23 DIAGNOSIS — E78.01 FAMILIAL HYPERCHOLESTEROLEMIA: ICD-10-CM

## 2020-11-23 DIAGNOSIS — Z00.00 HEALTHCARE MAINTENANCE: Primary | ICD-10-CM

## 2020-11-23 DIAGNOSIS — M54.40 ACUTE LEFT-SIDED LOW BACK PAIN WITH SCIATICA, SCIATICA LATERALITY UNSPECIFIED: ICD-10-CM

## 2020-11-23 PROCEDURE — 97530 THERAPEUTIC ACTIVITIES: CPT | Performed by: PHYSICAL THERAPIST

## 2020-11-23 PROCEDURE — 97110 THERAPEUTIC EXERCISES: CPT | Performed by: PHYSICAL THERAPIST

## 2020-11-23 PROCEDURE — 97012 MECHANICAL TRACTION THERAPY: CPT | Performed by: PHYSICAL THERAPIST

## 2020-11-23 NOTE — PROGRESS NOTES
Re-Assessment / Re-Certification        Patient: Dick Galindo   : 1945  Diagnosis/ICD-10 Code:  Spinal stenosis of lumbar region without neurogenic claudication [M48.061]  Referring practitioner: Mynor Russ MD  Date of Initial Visit: Type: THERAPY  Noted: 10/19/2020  Today's Date: 2020  Patient seen for 11 sessions      Subjective:   Dick Galindo reports: numbness in L foot and back pain at 3/10. Reports symptoms increase with walking and standing   Subjective Questionnaire: Oswestry: 15 (30%)  44% on initial evaluation   Clinical Progress: improved  Home Program Compliance: Yes  Treatment has included: therapeutic exercise, therapeutic activity and traction    Subjective   Objective   Plan Goals: STGs to be met by 2 weeks  Pt will be independent and compliant with initial home exercise program. Met  Pt will report low back and LE pain </= 5/10 to increase ease of preparing a meal. Met      LTGs to be met by 4 weeks  Pt will be independent and compliant with advanced home exercise program. Progressing  Pt will report low back and LE pain </= 3-4/10 to increase ease of grocery shopping. Progressing   Pt will score 34% on Oswestry indicating decreased perceived functional disability. Met  Pt will verbalize plan of safe and healthy cardiovascular exercise that will not exacerbate his symptom. Progressing   Assessment/Plan  Progress toward previous goals: Partially Met        Recommendations: Continue as planned  Timeframe: 1 month  Prognosis to achieve goals: good    PT Signature: Monserrat Zaragoza PT      Based upon review of the patient's progress and continued therapy plan, it is my medical opinion that Dick Galindo should continue physical therapy treatment at [facility]  [address] [place] [id number] [phone].    Signature: __________________________________  Mynor Russ MD    Manual Therapy:         mins   53527;  Therapeutic Exercise:   20      mins  16938;     Neuromuscular Kaitlyn:        mins  20418;    Therapeutic Activity:      10    mins  14062;     Gait Training:           mins  93809;     Ultrasound:          mins  32818;    Electrical Stimulation:         mins  16586 ( );  Dry Needling          mins self-pay  Traction                       _15___mins    Timed Treatment:  30    mins   Total Treatment:    45    mins

## 2020-11-24 LAB
ALBUMIN SERPL-MCNC: 4.4 G/DL (ref 3.5–5.2)
ALBUMIN/GLOB SERPL: 1.8 G/DL
ALP SERPL-CCNC: 76 U/L (ref 39–117)
ALT SERPL-CCNC: 30 U/L (ref 1–41)
AST SERPL-CCNC: 21 U/L (ref 1–40)
BILIRUB SERPL-MCNC: 0.5 MG/DL (ref 0–1.2)
BUN SERPL-MCNC: 18 MG/DL (ref 8–23)
BUN/CREAT SERPL: 22.2 (ref 7–25)
CALCIUM SERPL-MCNC: 9.4 MG/DL (ref 8.6–10.5)
CHLORIDE SERPL-SCNC: 98 MMOL/L (ref 98–107)
CHOLEST SERPL-MCNC: 143 MG/DL (ref 0–200)
CO2 SERPL-SCNC: 31 MMOL/L (ref 22–29)
CREAT SERPL-MCNC: 0.81 MG/DL (ref 0.76–1.27)
FERRITIN SERPL-MCNC: 37.4 NG/ML (ref 30–400)
GLOBULIN SER CALC-MCNC: 2.5 GM/DL
GLUCOSE SERPL-MCNC: 82 MG/DL (ref 65–99)
HCV AB S/CO SERPL IA: <0.1 S/CO RATIO (ref 0–0.9)
HDLC SERPL-MCNC: 66 MG/DL (ref 40–60)
IRON SATN MFR SERPL: 28 % (ref 20–50)
IRON SERPL-MCNC: 99 MCG/DL (ref 59–158)
LDLC SERPL CALC-MCNC: 61 MG/DL (ref 0–100)
POTASSIUM SERPL-SCNC: 4 MMOL/L (ref 3.5–5.2)
PROT SERPL-MCNC: 6.9 G/DL (ref 6–8.5)
SODIUM SERPL-SCNC: 136 MMOL/L (ref 136–145)
TIBC SERPL-MCNC: 350 MCG/DL
TRIGL SERPL-MCNC: 85 MG/DL (ref 0–150)
TSH SERPL DL<=0.005 MIU/L-ACNC: 3 UIU/ML (ref 0.27–4.2)
UIBC SERPL-MCNC: 251 MCG/DL (ref 112–346)
VLDLC SERPL CALC-MCNC: 16 MG/DL (ref 5–40)

## 2020-11-24 NOTE — PROGRESS NOTES
"Lab work overall looks good.  HDL \"good\" cholesterol improved.  Iron levels look good.  We will discuss this and more at upcoming visit."

## 2020-11-25 ENCOUNTER — TREATMENT (OUTPATIENT)
Dept: PHYSICAL THERAPY | Facility: CLINIC | Age: 75
End: 2020-11-25

## 2020-11-25 DIAGNOSIS — M54.40 ACUTE LEFT-SIDED LOW BACK PAIN WITH SCIATICA, SCIATICA LATERALITY UNSPECIFIED: ICD-10-CM

## 2020-11-25 DIAGNOSIS — M48.061 SPINAL STENOSIS OF LUMBAR REGION WITHOUT NEUROGENIC CLAUDICATION: Primary | ICD-10-CM

## 2020-11-25 PROCEDURE — 97110 THERAPEUTIC EXERCISES: CPT | Performed by: PHYSICAL THERAPIST

## 2020-11-25 PROCEDURE — 97012 MECHANICAL TRACTION THERAPY: CPT | Performed by: PHYSICAL THERAPIST

## 2020-11-25 NOTE — PROGRESS NOTES
Physical Therapy Daily Progress Note  Visit: 12    Subjective Dick Galindo reports: follows up with  next week.     Objective   See Exercise, Manual, and Modality Logs for complete treatment.     Assessment/Plan: follow up with  and will call to schedule if more PT ordered.     Manual Therapy:          mins  68397;  Therapeutic Exercise:      30    mins  80064;     Neuromuscular Kaitlyn:         mins  22350;    Therapeutic Activity:           mins  17174;     Gait Training:            mins  55239;     Ultrasound:           mins  63318;    Electrical Stimulation:          mins  37115 ( );  Dry Needling           mins self-pay  Traction     15      mins 96497  Canalith Repositioning         mins 53564      Timed Treatment:  45    mins   Total Treatment:    45    mins    Monserrat Zaragoza, RASHAD  [place] License #: [id number]    Physical Therapist

## 2020-11-30 ENCOUNTER — TELEPHONE (OUTPATIENT)
Dept: ORTHOPEDICS | Facility: OTHER | Age: 75
End: 2020-11-30

## 2020-12-01 ENCOUNTER — OFFICE VISIT (OUTPATIENT)
Dept: FAMILY MEDICINE CLINIC | Facility: CLINIC | Age: 75
End: 2020-12-01

## 2020-12-01 ENCOUNTER — LAB (OUTPATIENT)
Dept: LAB | Facility: HOSPITAL | Age: 75
End: 2020-12-01

## 2020-12-01 VITALS
WEIGHT: 225.9 LBS | OXYGEN SATURATION: 95 % | TEMPERATURE: 97.5 F | HEIGHT: 65 IN | HEART RATE: 57 BPM | DIASTOLIC BLOOD PRESSURE: 77 MMHG | SYSTOLIC BLOOD PRESSURE: 143 MMHG | BODY MASS INDEX: 37.64 KG/M2

## 2020-12-01 DIAGNOSIS — M48.062 SPINAL STENOSIS OF LUMBAR REGION WITH NEUROGENIC CLAUDICATION: ICD-10-CM

## 2020-12-01 DIAGNOSIS — I10 ESSENTIAL HYPERTENSION: ICD-10-CM

## 2020-12-01 DIAGNOSIS — E03.8 SUBCLINICAL HYPOTHYROIDISM: ICD-10-CM

## 2020-12-01 DIAGNOSIS — F33.42 RECURRENT MAJOR DEPRESSIVE DISORDER, IN FULL REMISSION (HCC): ICD-10-CM

## 2020-12-01 DIAGNOSIS — E78.01 FAMILIAL HYPERCHOLESTEROLEMIA: ICD-10-CM

## 2020-12-01 DIAGNOSIS — Z01.818 OTHER SPECIFIED PRE-OPERATIVE EXAMINATION: ICD-10-CM

## 2020-12-01 DIAGNOSIS — Z00.00 MEDICARE ANNUAL WELLNESS VISIT, SUBSEQUENT: Primary | ICD-10-CM

## 2020-12-01 PROCEDURE — 99214 OFFICE O/P EST MOD 30 MIN: CPT | Performed by: FAMILY MEDICINE

## 2020-12-01 PROCEDURE — C9803 HOPD COVID-19 SPEC COLLECT: HCPCS

## 2020-12-01 PROCEDURE — U0004 COV-19 TEST NON-CDC HGH THRU: HCPCS

## 2020-12-01 PROCEDURE — G0439 PPPS, SUBSEQ VISIT: HCPCS | Performed by: FAMILY MEDICINE

## 2020-12-01 RX ORDER — HYDROCHLOROTHIAZIDE 12.5 MG/1
12.5 TABLET ORAL DAILY
Qty: 90 TABLET | Refills: 1 | Status: SHIPPED | OUTPATIENT
Start: 2020-12-01 | End: 2021-05-15 | Stop reason: SDUPTHER

## 2020-12-01 NOTE — PROGRESS NOTES
Subjective   Dick Galindo is a 75 y.o. male.     Chief Complaint   Patient presents with   • Medicare Wellness-subsequent        History of Present Illness     Lumbar spinal stenosis with some claudication symptoms.  Worse when he stands up.  Symptoms are improved in the last 6 or 8 weeks through physical therapy.  Still bothersome but not as bad.  He is doing his home exercises.  No severe symptoms into the legs but they still bother him from time to time.    Hypothyroidism followup.  Taking levothyroxine as indicated.  No symptoms of high or low thyroid.  Last TSH as follows:    Lab Results   Component Value Date    TSH 3.000 11/23/2020        Hyperlipidemia follow up. He is taking statin medication without complaint. No myopathy symptoms.     Lab Results   Component Value Date    CHLPL 143 11/23/2020    TRIG 85 11/23/2020    HDL 66 (H) 11/23/2020    LDL 61 11/23/2020       Hypertension follow up.  Blood pressure at home is running approximately 140 systolic on average.  He was on Maxide about a year and a half ago.  But it was stopped because of his prostate symptoms.  Then he had the prostate surgery and things are much better from a urine flow standpoint.  He continues amlodipine, carvedilol, and lisinopril.  No cardiovascular or neurological symptoms. Today's BP: 143/77 .          The following portions of the patient's history were reviewed and updated as appropriate: allergies, current medications, past family history, past medical history, past social history, past surgical history and problem list.          Review of Systems   Constitutional: Negative.    HENT: Negative.    Respiratory: Negative.    Cardiovascular: Negative.    Gastrointestinal: Negative.  Negative for blood in stool.        No dysphagia.  No severe acid reflux symptoms.   Endocrine: Negative.    Genitourinary: Negative.  Negative for hematuria.   Musculoskeletal: Positive for back pain.   Skin: Negative.    Neurological: Negative.   "  Psychiatric/Behavioral: Negative.    All other systems reviewed and are negative.      Objective   Blood pressure 143/77, pulse 57, temperature 97.5 °F (36.4 °C), temperature source Temporal, height 164.6 cm (64.8\"), weight 102 kg (225 lb 14.4 oz), SpO2 95 %.  Physical Exam  Constitutional:       Appearance: Normal appearance.   HENT:      Head: Atraumatic.   Eyes:      Conjunctiva/sclera: Conjunctivae normal.   Neck:      Musculoskeletal: Normal range of motion and neck supple. No muscular tenderness.   Cardiovascular:      Rate and Rhythm: Normal rate and regular rhythm.      Pulses: Normal pulses.      Heart sounds: Normal heart sounds.   Pulmonary:      Effort: Pulmonary effort is normal.      Breath sounds: Normal breath sounds.   Abdominal:      General: Abdomen is flat. There is no distension.      Palpations: Abdomen is soft. There is no mass.      Tenderness: There is no abdominal tenderness.      Hernia: No hernia is present.   Musculoskeletal: Normal range of motion.   Lymphadenopathy:      Cervical: No cervical adenopathy.   Skin:     General: Skin is warm and dry.      Findings: No rash.   Neurological:      General: No focal deficit present.      Mental Status: He is alert and oriented to person, place, and time.   Psychiatric:         Mood and Affect: Mood normal.         Behavior: Behavior normal.         Assessment/Plan   Diagnoses and all orders for this visit:    1. Medicare annual wellness visit, subsequent (Primary)    2. Familial hypercholesterolemia    3. Essential hypertension  -     Comprehensive Metabolic Panel; Future  -     Lipid Panel; Future    4. Recurrent major depressive disorder, in full remission (CMS/[facility])    5. Subclinical hypothyroidism  -     TSH Rfx On Abnormal To Free T4; Future    6. Spinal stenosis of lumbar region with neurogenic claudication    Other orders  -     hydroCHLOROthiazide (HYDRODIURIL) 12.5 MG tablet; Take 1 tablet by mouth Daily.  Dispense: 90 tablet; " Refill: 1      Hyperlipidemia.  Continue atorvastatin.    Hypertension.  Needs better control.  Continue current medication.  Adding hydrochlorothiazide at low-dose 12.5 mg a day.  He is going to send me readings in 3 weeks.  May need to increase the dose or switch to chlorthalidone.  Holding off any lab work for the time being.  If we go to chlorthalidone pro probably need a BMP then.    Major depression.  Full remission.  Continue medication as is.    Hypothyroidism.  TSH therapeutic.  Check TSH prior to next visit.    Spinal stenosis.  With some claudication symptoms.  At this point he is doing better with the physical therapy and exercises.  Continue Tylenol.  He can take up to 2 g a day.  I will see him back in 6 months.  Sooner as needed.  He understands to contact me if the pain is getting much worse, then recommend MRI and/or further evaluation.

## 2020-12-01 NOTE — PROGRESS NOTES
"The ABCs of the Annual Wellness Visit  Subsequent Medicare Wellness Visit    Chief Complaint   Patient presents with   • Medicare Wellness-subsequent       Subjective   History of Present Illness:  Dick Galindo is a 75 y.o. male who presents for a Subsequent Medicare Wellness Visit.    HEALTH RISK ASSESSMENT    Recent Hospitalizations:  No hospitalization(s) within the last year.    Current Medical Providers:  Patient Care Team:  Mynor Russ MD as PCP - General (Family Medicine)  Mynor Russ MD as Referring Physician (Family Medicine)    Smoking Status:  Social History     Tobacco Use   Smoking Status Former Smoker   Smokeless Tobacco Never Used   Tobacco Comment    He smoked between the ages of 18 and 32.       Alcohol Consumption:  Social History     Substance and Sexual Activity   Alcohol Use Yes   • Alcohol/week: 1.0 standard drinks   • Types: 1 Glasses of wine per week    Comment: \"not heavy\"       Depression Screen:   PHQ-2/PHQ-9 Depression Screening 12/1/2020   Little interest or pleasure in doing things 0   Feeling down, depressed, or hopeless 0   Trouble falling or staying asleep, or sleeping too much -   Feeling tired or having little energy -   Poor appetite or overeating -   Feeling bad about yourself - or that you are a failure or have let yourself or your family down -   Trouble concentrating on things, such as reading the newspaper or watching television -   Moving or speaking so slowly that other people could have noticed. Or the opposite - being so fidgety or restless that you have been moving around a lot more than usual -   Thoughts that you would be better off dead, or of hurting yourself in some way -   Total Score 0   If you checked off any problems, how difficult have these problems made it for you to do your work, take care of things at home, or get along with other people? -       Fall Risk Screen:  STEADI Fall Risk Assessment was completed, and patient is at LOW risk for " falls.Assessment completed on:12/1/2020    Health Habits and Functional and Cognitive Screening:  Functional & Cognitive Status 12/1/2020   Do you have difficulty preparing food and eating? No   Do you have difficulty bathing yourself, getting dressed or grooming yourself? No   Do you have difficulty using the toilet? No   Do you have difficulty moving around from place to place? No   Do you have trouble with steps or getting out of a bed or a chair? No   Current Diet Well Balanced Diet   Dental Exam Up to date   Eye Exam Up to date   Exercise (times per week) 3 times per week   Current Exercise Activities Include Cardiovasular Workout on Exercise Equipment   Do you need help using the phone?  No   Are you deaf or do you have serious difficulty hearing?  No   Do you need help with transportation? No   Do you need help shopping? No   Do you need help preparing meals?  No   Do you need help with housework?  No   Do you need help with laundry? No   Do you need help taking your medications? No   Do you need help managing money? No   Do you ever drive or ride in a car without wearing a seat belt? No   Have you felt unusual stress, anger or loneliness in the last month? No   Who do you live with? Spouse   If you need help, do you have trouble finding someone available to you? No   Have you been bothered in the last four weeks by sexual problems? No   Do you have difficulty concentrating, remembering or making decisions? No         Does the patient have evidence of cognitive impairment? No    Asprin use counseling:Does not need ASA (and currently is not on it)    Age-appropriate Screening Schedule:  Refer to the list below for future screening recommendations based on patient's age, sex and/or medical conditions. Orders for these recommended tests are listed in the plan section. The patient has been provided with a written plan.    Health Maintenance   Topic Date Due   • TDAP/TD VACCINES (1 - Tdap) 06/22/1966   • ZOSTER  VACCINE (1 of 2) 06/22/1997   • COLONOSCOPY  02/06/2020   • LIPID PANEL  11/23/2021   • INFLUENZA VACCINE  Completed          The following portions of the patient's history were reviewed and updated as appropriate: allergies, current medications, past family history, past medical history, past social history, past surgical history and problem list.    Outpatient Medications Prior to Visit   Medication Sig Dispense Refill   • acetaminophen (TYLENOL) 325 MG tablet Take 650 mg by mouth Every 6 (Six) Hours As Needed.     • albuterol sulfate HFA (Ventolin HFA) 108 (90 Base) MCG/ACT inhaler 2 puffs Q4H for cough and wheeze (Patient taking differently: Inhale 2 puffs Every 4 (Four) Hours As Needed. 2 puffs Q4H for cough and wheeze) 18 g 3   • amLODIPine (NORVASC) 10 MG tablet Take 1 tablet by mouth Daily. 90 tablet 1   • atorvastatin (LIPITOR) 40 MG tablet Take 1 tablet by mouth Daily. 90 tablet 1   • carvedilol (COREG) 6.25 MG tablet Take 1 tablet by mouth 2 (Two) Times a Day With Meals. 180 tablet 1   • levothyroxine (SYNTHROID, LEVOTHROID) 50 MCG tablet Take 1 tablet by mouth Daily. 90 tablet 1   • lisinopril (PRINIVIL,ZESTRIL) 40 MG tablet Take 1 tablet by mouth Daily. 90 tablet 1   • mupirocin (Bactroban) 2 % ointment Apply  topically to the appropriate area as directed 3 (Three) Times a Day. 22 g 0   • sennosides-docusate (senna-docusate sodium) 8.6-50 MG per tablet Take 2 tablets by mouth Every Night. 60 tablet 0   • sertraline (ZOLOFT) 50 MG tablet Take 1 tablet by mouth Daily. 90 tablet 1     No facility-administered medications prior to visit.        Patient Active Problem List   Diagnosis   • Spinal stenosis of lumbar region   • Essential hypertension   • Lower urinary tract symptoms (LUTS)   • Familial hypercholesterolemia   • Recurrent major depressive disorder, in full remission (CMS/HCC)   • Hyperplastic colon polyp   • Gastroesophageal reflux disease without esophagitis   • Subclinical hypothyroidism   •  "Severe ROMINA on auto CPAP   • Sleep-related hypoxia   • Bilateral renal cysts   • Class 2 severe obesity due to excess calories with serious comorbidity and body mass index (BMI) of 37.0 to 37.9 in adult (CMS/[facility])   • Hypersomnia due to medical condition   • BPH with obstruction/lower urinary tract symptoms   • Colon polyp       Advanced Care Planning:  ACP discussion was held with the patient during this visit. Patient has an advance directive in EMR which is still valid.     Review of Systems    Compared to one year ago, the patient feels his physical health is the same.  Compared to one year ago, the patient feels his mental health is the same.    Reviewed chart for potential of high risk medication in the elderly: yes  Reviewed chart for potential of harmful drug interactions in the elderly:yes    Objective         Vitals:    12/01/20 0900   BP: 143/77   Pulse: 57   Temp: 97.5 °F (36.4 °C)   TempSrc: Temporal   SpO2: 95%   Weight: 102 kg (225 lb 14.4 oz)   Height: 164.6 cm (64.8\")       Body mass index is 37.82 kg/m².  Discussed the patient's BMI with him. The BMI is elevated. addressed. .    Physical Exam    Lab Results   Component Value Date    GLU 82 11/23/2020    CHLPL 143 11/23/2020    TRIG 85 11/23/2020    HDL 66 (H) 11/23/2020    LDL 61 11/23/2020    VLDL 16 11/23/2020        Assessment/Plan   Medicare Risks and Personalized Health Plan  CMS Preventative Services Quick Reference  Advance Directive Discussion  Immunizations Discussed/Encouraged (specific immunizations; adacel Tdap and Shingrix ) to get at retail pharmacy.  Continue regular exercise.    The above risks/problems have been discussed with the patient.  Pertinent information has been shared with the patient in the After Visit Summary.  Follow up plans and orders are seen below in the Assessment/Plan Section.    Diagnoses and all orders for this visit:    1. Medicare annual wellness visit, subsequent (Primary)    2. Familial " hypercholesterolemia    3. Essential hypertension  -     Comprehensive Metabolic Panel; Future  -     Lipid Panel; Future    4. Recurrent major depressive disorder, in full remission (CMS/HCC)    5. Subclinical hypothyroidism  -     TSH Rfx On Abnormal To Free T4; Future    6. Spinal stenosis of lumbar region with neurogenic claudication    Other orders  -     hydroCHLOROthiazide (HYDRODIURIL) 12.5 MG tablet; Take 1 tablet by mouth Daily.  Dispense: 90 tablet; Refill: 1      Follow Up:  No follow-ups on file.     An After Visit Summary and PPPS were given to the patient.

## 2020-12-02 ENCOUNTER — OFFICE VISIT (OUTPATIENT)
Dept: SLEEP MEDICINE | Facility: HOSPITAL | Age: 75
End: 2020-12-02

## 2020-12-02 VITALS — WEIGHT: 226 LBS | BODY MASS INDEX: 36.32 KG/M2 | HEIGHT: 66 IN | OXYGEN SATURATION: 97 %

## 2020-12-02 DIAGNOSIS — IMO0002 SLEEP-RELATED HYPOVENTILATION: ICD-10-CM

## 2020-12-02 DIAGNOSIS — Z99.89 OSA ON CPAP: Primary | ICD-10-CM

## 2020-12-02 DIAGNOSIS — G47.33 OSA ON CPAP: Primary | ICD-10-CM

## 2020-12-02 DIAGNOSIS — E66.01 CLASS 2 SEVERE OBESITY DUE TO EXCESS CALORIES WITH SERIOUS COMORBIDITY AND BODY MASS INDEX (BMI) OF 38.0 TO 38.9 IN ADULT (HCC): ICD-10-CM

## 2020-12-02 LAB — SARS-COV-2 RNA RESP QL NAA+PROBE: NOT DETECTED

## 2020-12-02 PROCEDURE — G0463 HOSPITAL OUTPT CLINIC VISIT: HCPCS

## 2020-12-02 PROCEDURE — 99213 OFFICE O/P EST LOW 20 MIN: CPT | Performed by: INTERNAL MEDICINE

## 2020-12-02 NOTE — PROGRESS NOTES
"Follow Up [facility] Note     Chief Complaint:  ROMINA     Primary Care Physician: Mynor Russ MD    Interval History:   The patient is a 75 y.o. male who I last saw 1 year ago.  The patient is stable without new complaints.  He goes to bed 11 PM and awakens at 8 AM.  He will use the bathroom during the nighttime.  [place] Sleepiness Scale is normal at 2    Review of Systems:    A complete review of systems was done and all were negative with the exception of the above    Social History:    Social History     Socioeconomic History   • Marital status:      Spouse name: Not on file   • Number of children: Not on file   • Years of education: Not on file   • Highest education level: Not on file   Tobacco Use   • Smoking status: Former Smoker   • Smokeless tobacco: Never Used   • Tobacco comment: He smoked between the ages of 18 and 32.   Substance and Sexual Activity   • Alcohol use: Yes     Alcohol/week: 1.0 standard drinks     Types: 1 Glasses of wine per week     Comment: \"not heavy\"   • Drug use: No       Allergies:  Amoxicillin and Levaquin [levofloxacin]     Medication Review: His list was reviewed.      Vital Signs:    Vitals:    12/02/20 0900   SpO2: 97%   Weight: 103 kg (226 lb)   Height: 167.6 cm (66\")     Body mass index is 36.48 kg/m².    Physical Exam:    Constitutional:  Well developed 75 y.o. male that appears in no apparent distress.  Awake & oriented times 3.  Normal mood with normal recent and remote memory and normal judgement.  Eyes:  Conjunctivae normal.  Oropharynx: Previously, moist mucous membranes without exudate and a large tongue and normal uvula and narrow posterior pharyngeal opening and class II-3 Mallampati airway, patient is wearing a facemask.     Results Review:  DME is [facility] and he uses a fullface mask.  Downloads between 9/2 and 11/30/2020 compliance 100%.  Average usage is 8 hours and 28 minutes.  Average AHI is normal without leak.  Average AutoCPAP pressure " is 10.2 and his auto CPAP is 8-20.    Impression:   Severe obstructive sleep apnea with sleep-related hypoxia by home sleep study 4/7/2018 adequately treated with auto CPAP with good compliance and usage and no complaints of hypersomnolence.    Plan:  Good sleep hygiene measures should be maintained.  Weight loss would be beneficial in this patient who is obese by BMI.  The patient is benefiting from the treatment being provided.     The patient will continue auto CPAP and a new prescription will be sent to his DME    The patient will call for any problems and will follow up in 1 year.      Kendrick Diaz MD  Sleep Medicine  12/02/20  09:54 EST

## 2020-12-03 ENCOUNTER — ANESTHESIA (OUTPATIENT)
Dept: GASTROENTEROLOGY | Facility: HOSPITAL | Age: 75
End: 2020-12-03

## 2020-12-03 ENCOUNTER — ANESTHESIA EVENT (OUTPATIENT)
Dept: GASTROENTEROLOGY | Facility: HOSPITAL | Age: 75
End: 2020-12-03

## 2020-12-03 ENCOUNTER — HOSPITAL ENCOUNTER (OUTPATIENT)
Facility: HOSPITAL | Age: 75
Setting detail: HOSPITAL OUTPATIENT SURGERY
Discharge: HOME | End: 2020-12-03
Attending: INTERNAL MEDICINE | Admitting: INTERNAL MEDICINE

## 2020-12-03 VITALS
WEIGHT: 220.8 LBS | HEIGHT: 66 IN | OXYGEN SATURATION: 95 % | HEART RATE: 58 BPM | RESPIRATION RATE: 16 BRPM | DIASTOLIC BLOOD PRESSURE: 60 MMHG | BODY MASS INDEX: 35.48 KG/M2 | SYSTOLIC BLOOD PRESSURE: 129 MMHG

## 2020-12-03 DIAGNOSIS — K63.5 COLON POLYP: ICD-10-CM

## 2020-12-03 PROCEDURE — 45380 COLONOSCOPY AND BIOPSY: CPT | Performed by: INTERNAL MEDICINE

## 2020-12-03 PROCEDURE — 25010000002 PROPOFOL 10 MG/ML EMULSION: Performed by: NURSE ANESTHETIST, CERTIFIED REGISTERED

## 2020-12-03 PROCEDURE — 88305 TISSUE EXAM BY PATHOLOGIST: CPT | Performed by: INTERNAL MEDICINE

## 2020-12-03 RX ORDER — SODIUM CHLORIDE, SODIUM LACTATE, POTASSIUM CHLORIDE, CALCIUM CHLORIDE 600; 310; 30; 20 MG/100ML; MG/100ML; MG/100ML; MG/100ML
1000 INJECTION, SOLUTION INTRAVENOUS CONTINUOUS
Status: DISCONTINUED | OUTPATIENT
Start: 2020-12-03 | End: 2020-12-03 | Stop reason: HOSPADM

## 2020-12-03 RX ORDER — LIDOCAINE HYDROCHLORIDE 20 MG/ML
INJECTION, SOLUTION INFILTRATION; PERINEURAL AS NEEDED
Status: DISCONTINUED | OUTPATIENT
Start: 2020-12-03 | End: 2020-12-03 | Stop reason: SURG

## 2020-12-03 RX ORDER — PROPOFOL 10 MG/ML
VIAL (ML) INTRAVENOUS AS NEEDED
Status: DISCONTINUED | OUTPATIENT
Start: 2020-12-03 | End: 2020-12-03 | Stop reason: SURG

## 2020-12-03 RX ORDER — PROPOFOL 10 MG/ML
VIAL (ML) INTRAVENOUS CONTINUOUS PRN
Status: DISCONTINUED | OUTPATIENT
Start: 2020-12-03 | End: 2020-12-03 | Stop reason: SURG

## 2020-12-03 RX ADMIN — LIDOCAINE HYDROCHLORIDE 50 MG: 20 INJECTION, SOLUTION INFILTRATION; PERINEURAL at 13:50

## 2020-12-03 RX ADMIN — PROPOFOL 100 MCG/KG/MIN: 10 INJECTION, EMULSION INTRAVENOUS at 13:50

## 2020-12-03 RX ADMIN — SODIUM CHLORIDE, POTASSIUM CHLORIDE, SODIUM LACTATE AND CALCIUM CHLORIDE 1000 ML: 600; 310; 30; 20 INJECTION, SOLUTION INTRAVENOUS at 12:48

## 2020-12-03 RX ADMIN — PROPOFOL 100 MG: 10 INJECTION, EMULSION INTRAVENOUS at 13:50

## 2020-12-03 NOTE — ANESTHESIA POSTPROCEDURE EVALUATION
"Patient: Dick Galindo    Procedure Summary     Date: 12/03/20 Room / Location: [facility] ENDOSCOPY 5 /  [facility]    Anesthesia Start: 1346 Anesthesia Stop: 1415    Procedure: COLONOSCOPY TO CECUM AND INTO TI WITH COLD BIOPSIES AND COLD BIOPSY POLYPECTOMY (N/A ) Diagnosis:       Colon polyp      (Colon polyp [K63.5])    Surgeon: Usman Roper MD Provider: Charanjit Davis MD    Anesthesia Type: MAC ASA Status: 3          Anesthesia Type: MAC    Vitals  Vitals Value Taken Time   /60 12/03/20 1433   Temp     Pulse 58 12/03/20 1433   Resp 16 12/03/20 1433   SpO2 95 % 12/03/20 1433           Post Anesthesia Care and Evaluation    Patient location during evaluation: bedside  Patient participation: complete - patient participated  Level of consciousness: sleepy but conscious  Pain score: 0  Pain management: adequate  Airway patency: patent  Anesthetic complications: No anesthetic complications    Cardiovascular status: acceptable  Respiratory status: acceptable  Hydration status: acceptable    Comments: /60 (BP Location: Left arm, Patient Position: Sitting)   Pulse 58   Resp 16   Ht 167.6 cm (66\")   Wt 100 kg (220 lb 12.8 oz)   SpO2 95%   BMI 35.64 kg/m²         "

## 2020-12-03 NOTE — DISCHARGE INSTRUCTIONS
For the next 24 hours patient needs to be with a responsible adult.    For 24 hours DO NOT drive, operate machinery, appliances, drink alcohol, make important decisions or sign legal documents.    Start with a light or bland diet and advance to regular diet as tolerated.    Follow recommendations on procedure report provided by your doctor.    Call Dr Roper for problems [phone]    Problems may include but not limited to: large amounts of bleeding, trouble breathing, repeated vomiting, severe unrelieved pain, fever or chills.

## 2020-12-03 NOTE — ANESTHESIA PREPROCEDURE EVALUATION
Anesthesia Evaluation     Patient summary reviewed and Nursing notes reviewed                Airway   Mallampati: III  Possible difficult intubation  Dental      Pulmonary    (+) a smoker Former, asthma,sleep apnea on CPAP,   Cardiovascular     ECG reviewed  Rhythm: regular  Rate: normal    (+) hypertension, hyperlipidemia,       Neuro/Psych  (+) psychiatric history Anxiety,     GI/Hepatic/Renal/Endo    (+) obesity,  GERD,  renal disease,     Musculoskeletal (-) negative ROS    Abdominal    Substance History - negative use     OB/GYN negative ob/gyn ROS         Other                        Anesthesia Plan    ASA 3     MAC     intravenous induction     Anesthetic plan, all risks, benefits, and alternatives have been provided, discussed and informed consent has been obtained with: patient.

## 2020-12-03 NOTE — H&P
[facility]  Pre Procedure History & Physical    Chief Complaint:   Time for my colonoscopy    Subjective     HPI:   75 y.o. male who has a h/o colonic adenomas. His last colonoscopy was in 2/2018.     Past Medical History:   Past Medical History:   Diagnosis Date   • Anxiety    • Asthma    • Colon polyps    • Disease of thyroid gland    • Enlarged prostate    • High cholesterol    • History of anemia    • Hypertension    • Sleep apnea     CPAP   • Slow urinary stream        Family History:  Family History   Problem Relation Age of Onset   • Stroke Father    • Heart attack Father    • No Known Problems Maternal Grandmother    • No Known Problems Maternal Grandfather    • No Known Problems Paternal Grandmother    • No Known Problems Paternal Grandfather    • Malig Hyperthermia Neg Hx        Social History:   reports that he has quit smoking. He has never used smokeless tobacco. He reports current alcohol use of about 1.0 standard drinks of alcohol per week. He reports that he does not use drugs.    Medications:   Medications Prior to Admission   Medication Sig Dispense Refill Last Dose   • albuterol sulfate HFA (Ventolin HFA) 108 (90 Base) MCG/ACT inhaler 2 puffs Q4H for cough and wheeze (Patient taking differently: Inhale 2 puffs Every 4 (Four) Hours As Needed. 2 puffs Q4H for cough and wheeze) 18 g 3 Past Month at Unknown time   • amLODIPine (NORVASC) 10 MG tablet Take 1 tablet by mouth Daily. 90 tablet 1 12/3/2020 at Unknown time   • carvedilol (COREG) 6.25 MG tablet Take 1 tablet by mouth 2 (Two) Times a Day With Meals. 180 tablet 1 12/3/2020 at Unknown time   • hydroCHLOROthiazide (HYDRODIURIL) 12.5 MG tablet Take 1 tablet by mouth Daily. 90 tablet 1 12/2/2020 at Unknown time   • lisinopril (PRINIVIL,ZESTRIL) 40 MG tablet Take 1 tablet by mouth Daily. 90 tablet 1 12/3/2020 at Unknown time   • acetaminophen (TYLENOL) 325 MG tablet Take 650 mg by mouth Every 6 (Six) Hours As Needed.      •  "atorvastatin (LIPITOR) 40 MG tablet Take 1 tablet by mouth Daily. 90 tablet 1    • levothyroxine (SYNTHROID, LEVOTHROID) 50 MCG tablet Take 1 tablet by mouth Daily. 90 tablet 1    • mupirocin (Bactroban) 2 % ointment Apply  topically to the appropriate area as directed 3 (Three) Times a Day. 22 g 0    • sennosides-docusate (senna-docusate sodium) 8.6-50 MG per tablet Take 2 tablets by mouth Every Night. 60 tablet 0    • sertraline (ZOLOFT) 50 MG tablet Take 1 tablet by mouth Daily. 90 tablet 1        Allergies:  Amoxicillin and Levaquin [levofloxacin]    ROS:    Pertinent items are noted in HPI     Objective     Blood pressure 150/77, pulse 61, resp. rate 12, height 167.6 cm (66\"), weight 100 kg (220 lb 12.8 oz), SpO2 96 %.    Physical Exam   Constitutional: Pt is oriented to person, place, and time and well-developed, well-nourished, and in no distress.   HENT:   Mouth/Throat: Oropharynx is clear and moist.   Neck: Normal range of motion. Neck supple.   Cardiovascular: Normal rate, regular rhythm and normal heart sounds.    Pulmonary/Chest: Effort normal and breath sounds normal. No respiratory distress. No  wheezes.   Abdominal: Soft. Bowel sounds are normal.   Skin: Skin is warm and dry.   Psychiatric: Mood, memory, affect and judgment normal.     Assessment/Plan     Diagnosis:  75 y.o. male who has a h/o colonic adenomas. His last colonoscopy was in 2/2018.     Anticipated Surgical Procedure:  Colonoscopy    The risks, benefits, and alternatives of this procedure have been discussed with the patient or the responsible party- the patient understands and agrees to proceed.    Usman Roper M.D.  "

## 2020-12-04 LAB
CYTO UR: NORMAL
LAB AP CASE REPORT: NORMAL
LAB AP DIAGNOSIS COMMENT: NORMAL
PATH REPORT.FINAL DX SPEC: NORMAL
PATH REPORT.GROSS SPEC: NORMAL

## 2020-12-07 NOTE — PROGRESS NOTES
12/07/20  Tell him that the biopsies from the colonoscopy all looked good. I would recommend that he have a repeat colonoscopy in 3 years. Please fax a copy of this report to his PCP.  Julia hartmann

## 2021-01-12 ENCOUNTER — TELEPHONE (OUTPATIENT)
Dept: GASTROENTEROLOGY | Facility: CLINIC | Age: 76
End: 2021-01-12

## 2021-01-12 NOTE — TELEPHONE ENCOUNTER
Called pt and advised of Dr Roper's note. Verb understanding.     C/s placed in recall and hm  for 12/03/2023.  Results sent to Dr Russ thru [facility].   Abdomen soft, nontender, nondistended, bowel sounds present in all 4 quadrants.

## 2021-01-12 NOTE — TELEPHONE ENCOUNTER
----- Message from Usman Roper MD sent at 12/7/2020  2:03 AM EST -----  12/07/20  Tell him that the biopsies from the colonoscopy all looked good. I would recommend that he have a repeat colonoscopy in 3 years. Please fax a copy of this report to his PCP.  Thx. kjh

## 2021-03-04 DIAGNOSIS — Z23 IMMUNIZATION DUE: ICD-10-CM

## 2021-04-15 ENCOUNTER — OFFICE VISIT (OUTPATIENT)
Dept: ONCOLOGY | Facility: CLINIC | Age: 76
End: 2021-04-15

## 2021-04-15 ENCOUNTER — LAB (OUTPATIENT)
Dept: OTHER | Facility: HOSPITAL | Age: 76
End: 2021-04-15

## 2021-04-15 VITALS
BODY MASS INDEX: 36.85 KG/M2 | OXYGEN SATURATION: 95 % | WEIGHT: 229.3 LBS | TEMPERATURE: 98.2 F | DIASTOLIC BLOOD PRESSURE: 79 MMHG | SYSTOLIC BLOOD PRESSURE: 136 MMHG | HEIGHT: 66 IN | RESPIRATION RATE: 16 BRPM | HEART RATE: 58 BPM

## 2021-04-15 DIAGNOSIS — D50.0 IRON DEFICIENCY ANEMIA DUE TO CHRONIC BLOOD LOSS: Primary | ICD-10-CM

## 2021-04-15 DIAGNOSIS — D50.0 IRON DEFICIENCY ANEMIA DUE TO CHRONIC BLOOD LOSS: ICD-10-CM

## 2021-04-15 LAB
ALBUMIN SERPL-MCNC: 4.1 G/DL (ref 3.5–5.2)
ALBUMIN/GLOB SERPL: 1.3 G/DL
ALP SERPL-CCNC: 74 U/L (ref 39–117)
ALT SERPL W P-5'-P-CCNC: 27 U/L (ref 1–41)
ANION GAP SERPL CALCULATED.3IONS-SCNC: 8.7 MMOL/L (ref 5–15)
AST SERPL-CCNC: 26 U/L (ref 1–40)
BASOPHILS # BLD AUTO: 0.09 10*3/MM3 (ref 0–0.2)
BASOPHILS NFR BLD AUTO: 1.2 % (ref 0–1.5)
BILIRUB SERPL-MCNC: 0.5 MG/DL (ref 0–1.2)
BUN SERPL-MCNC: 27 MG/DL (ref 8–23)
BUN/CREAT SERPL: 22.3 (ref 7–25)
CALCIUM SPEC-SCNC: 9.5 MG/DL (ref 8.6–10.5)
CHLORIDE SERPL-SCNC: 102 MMOL/L (ref 98–107)
CO2 SERPL-SCNC: 32.3 MMOL/L (ref 22–29)
CREAT SERPL-MCNC: 1.21 MG/DL (ref 0.76–1.27)
DEPRECATED RDW RBC AUTO: 41.2 FL (ref 37–54)
EOSINOPHIL # BLD AUTO: 0.2 10*3/MM3 (ref 0–0.4)
EOSINOPHIL NFR BLD AUTO: 2.7 % (ref 0.3–6.2)
ERYTHROCYTE [DISTWIDTH] IN BLOOD BY AUTOMATED COUNT: 11.9 % (ref 12.3–15.4)
FERRITIN SERPL-MCNC: 32.8 NG/ML (ref 30–400)
GFR SERPL CREATININE-BSD FRML MDRD: 59 ML/MIN/1.73
GLOBULIN UR ELPH-MCNC: 3.1 GM/DL
GLUCOSE SERPL-MCNC: 101 MG/DL (ref 65–99)
HCT VFR BLD AUTO: 41.8 % (ref 37.5–51)
HGB BLD-MCNC: 13.7 G/DL (ref 13–17.7)
IMM GRANULOCYTES # BLD AUTO: 0.03 10*3/MM3 (ref 0–0.05)
IMM GRANULOCYTES NFR BLD AUTO: 0.4 % (ref 0–0.5)
IRON 24H UR-MRATE: 111 MCG/DL (ref 59–158)
IRON SATN MFR SERPL: 30 % (ref 20–50)
LYMPHOCYTES # BLD AUTO: 1.99 10*3/MM3 (ref 0.7–3.1)
LYMPHOCYTES NFR BLD AUTO: 26.4 % (ref 19.6–45.3)
MCH RBC QN AUTO: 30.5 PG (ref 26.6–33)
MCHC RBC AUTO-ENTMCNC: 32.8 G/DL (ref 31.5–35.7)
MCV RBC AUTO: 93.1 FL (ref 79–97)
MONOCYTES # BLD AUTO: 0.85 10*3/MM3 (ref 0.1–0.9)
MONOCYTES NFR BLD AUTO: 11.3 % (ref 5–12)
NEUTROPHILS NFR BLD AUTO: 4.37 10*3/MM3 (ref 1.7–7)
NEUTROPHILS NFR BLD AUTO: 58 % (ref 42.7–76)
NRBC BLD AUTO-RTO: 0 /100 WBC (ref 0–0.2)
PLATELET # BLD AUTO: 246 10*3/MM3 (ref 140–450)
PMV BLD AUTO: 8.8 FL (ref 6–12)
POTASSIUM SERPL-SCNC: 4.5 MMOL/L (ref 3.5–5.2)
PROT SERPL-MCNC: 7.2 G/DL (ref 6–8.5)
RBC # BLD AUTO: 4.49 10*6/MM3 (ref 4.14–5.8)
SODIUM SERPL-SCNC: 143 MMOL/L (ref 136–145)
TIBC SERPL-MCNC: 375 MCG/DL (ref 298–536)
TRANSFERRIN SERPL-MCNC: 252 MG/DL (ref 200–360)
WBC # BLD AUTO: 7.53 10*3/MM3 (ref 3.4–10.8)

## 2021-04-15 PROCEDURE — 99214 OFFICE O/P EST MOD 30 MIN: CPT | Performed by: INTERNAL MEDICINE

## 2021-04-15 PROCEDURE — 85025 COMPLETE CBC W/AUTO DIFF WBC: CPT | Performed by: INTERNAL MEDICINE

## 2021-04-15 PROCEDURE — 36415 COLL VENOUS BLD VENIPUNCTURE: CPT

## 2021-04-15 PROCEDURE — 84466 ASSAY OF TRANSFERRIN: CPT | Performed by: INTERNAL MEDICINE

## 2021-04-15 PROCEDURE — 80053 COMPREHEN METABOLIC PANEL: CPT | Performed by: INTERNAL MEDICINE

## 2021-04-15 PROCEDURE — 83540 ASSAY OF IRON: CPT | Performed by: INTERNAL MEDICINE

## 2021-04-15 PROCEDURE — 82728 ASSAY OF FERRITIN: CPT | Performed by: INTERNAL MEDICINE

## 2021-04-15 NOTE — PROGRESS NOTES
Subjective   Dick Galindo is a 75 y.o. male.  Referred by Dr. Rodriguez for evaluation and management of anemia    History of Present Illness   Mr. Dubois is a 75-year-old  male with history of hypertension, hyperlipidemia, BPH status post TURP on 7/7/2020 presents for further evaluation of anemia.  He was scheduled for the TURP procedure on 7/7/2020 at which point a CBC was checked and his hemoglobin was noted to be low at 9.2.  This prompted further work-up with vitamin B12, folic acid, iron studies.  The vitamin B12 and folic acid levels were noted to be normal.  Iron studies showed a low ferritin consistent with iron deficiency.  He was then placed on oral iron which he has been taking for the past 2 weeks.  Today a repeat CBC shows an improvement in hemoglobin to 11.2.  He denies any lightheadedness, dizziness, chest pain, dyspnea, palpitations, lower extremity edema.  He reports having lost about 10 pounds over the past 2 weeks, he attributes this to changes in his diet as well as decreased appetite since the surgery.  He denies noting any blood in his stool any melena.  Denies any hematemesis.  He has been on Prevacid for a long time now for GERD.  His last colonoscopy was in 2018 on which there was diverticulosis, polyps and internal hemorrhoids noted.  EGD was also performed in 2018 on which mildly erythematous mucosa of the esophagus was noted but no other abnormalities.  He was scheduled for a colonoscopy in February 2020 by Dr. Roper however this has been delayed because of COVID and he plans to undergo colonoscopy soon.    Interval history  Patient presents to the clinic today for follow-up.  He reports feeling well.  He has chronic low back pain which is unchanged  He takes over-the-counter Pepcid to help with reflux.  He had a colonoscopy in December 2020 performed by Dr. Roper Which showed diverticulosis in the sigmoid colon.  There was polypoid tissue noted which was biopsied and noted to be  benign.  He quit taking the iron pills about 3 to 4 months ago.  Reports normal energy levels, denies any lightheadedness, dizziness, constipation diarrhea, blood in stool  or dark red stools.    The following portions of the patient's history were reviewed and updated as appropriate: allergies, current medications, past family history, past medical history, past social history, past surgical history and problem list.    Past Medical History:   Diagnosis Date   • Anxiety    • Asthma    • Colon polyps    • Disease of thyroid gland    • Enlarged prostate    • High cholesterol    • History of anemia    • Hypertension    • Sleep apnea     CPAP   • Slow urinary stream         Past Surgical History:   Procedure Laterality Date   • COLONOSCOPY  approx 2012    • COLONOSCOPY N/A 2/6/2018    Procedure: COLONOSCOPY INTO CEECUM AND TERMINAL MILEUM WITH COLD BIOPSY POLYPECTOMIES & COLD BIOIPSIES;  Surgeon: Usman Roper MD;  Location: [facility] ENDOSCOPY;  Service:    • COLONOSCOPY N/A 12/3/2020    Procedure: COLONOSCOPY TO CECUM AND INTO TI WITH COLD BIOPSIES AND COLD BIOPSY POLYPECTOMY;  Surgeon: Usman Roper MD;  Location: [facility] ENDOSCOPY;  Service: Gastroenterology;  Laterality: N/A;  pre: history of colon polyps  post: diverticulosis, polyp, and internal hemorrhoids   • CYSTOSCOPY TRANSURETHRAL RESECTION OF PROSTATE N/A 7/6/2020    Procedure: TRANSURETHRAL RESECTION OF PROSTATE;  Surgeon: Charanjit Cameron MD;  Location: [facility] OR;  Service: Urology;  Laterality: N/A;   • ENDOSCOPY N/A 2/6/2018    Procedure: ESOPHAGOGASTRODUODENOSCOPY WITH COLD BIOPSIES;  Surgeon: Usman Roper MD;  Location: [facility] ENDOSCOPY;  Service:    • TONSILLECTOMY          Family History   Problem Relation Age of Onset   • Stroke Father    • Heart attack Father    • No Known Problems Maternal Grandmother    • No Known Problems Maternal Grandfather    • No Known Problems Paternal Grandmother    • No Known Problems Paternal Grandfather    •  "Malig Hyperthermia Neg Hx         Social History     Socioeconomic History   • Marital status:      Spouse name: Not on file   • Number of children: Not on file   • Years of education: Not on file   • Highest education level: Not on file   Tobacco Use   • Smoking status: Former Smoker   • Smokeless tobacco: Never Used   • Tobacco comment: He smoked between the ages of 18 and 32.   Substance and Sexual Activity   • Alcohol use: Yes     Alcohol/week: 1.0 standard drinks     Types: 1 Glasses of wine per week     Comment: \"not heavy\"   • Drug use: No             Allergies   Allergen Reactions   • Amoxicillin Itching   • Levaquin [Levofloxacin] Nausea And Vomiting            Review of Systems   Constitutional: Negative for activity change, appetite change, chills, diaphoresis, fatigue, fever, unexpected weight gain and unexpected weight loss.   HENT: Negative for congestion, dental problem, drooling, ear discharge, ear pain, facial swelling, hearing loss, mouth sores, nosebleeds, postnasal drip, rhinorrhea, sinus pressure, sneezing, sore throat, swollen glands, tinnitus, trouble swallowing and voice change.    Eyes: Negative for blurred vision, double vision, photophobia, pain, discharge, redness, itching and visual disturbance.   Respiratory: Negative for apnea, cough, choking, chest tightness, shortness of breath, wheezing and stridor.    Cardiovascular: Negative for chest pain, palpitations and leg swelling.   Gastrointestinal: Positive for GERD. Negative for abdominal distention, abdominal pain, anal bleeding, blood in stool, constipation, diarrhea, nausea, rectal pain, vomiting and indigestion.   Endocrine: Negative for cold intolerance, heat intolerance, polydipsia, polyphagia and polyuria.   Genitourinary: Negative for decreased urine volume, difficulty urinating, discharge, dysuria, flank pain, frequency, genital sores, hematuria, nocturia, erectile dysfunction and urgency.   Musculoskeletal: Negative for " "arthralgias, back pain, gait problem, joint swelling, myalgias, neck pain, neck stiffness and bursitis.   Skin: Negative for color change, dry skin, pallor, rash, skin lesions and bruise.   Allergic/Immunologic: Negative for environmental allergies, food allergies and immunocompromised state.   Neurological: Negative for dizziness, tremors, seizures, syncope, facial asymmetry, speech difficulty, weakness, light-headedness, numbness, headache, memory problem and confusion.   Hematological: Negative for adenopathy. Does not bruise/bleed easily.   Psychiatric/Behavioral: Negative for agitation, behavioral problems, decreased concentration, dysphoric mood, hallucinations, self-injury, sleep disturbance, suicidal ideas, negative for hyperactivity, depressed mood and stress. The patient is not nervous/anxious.      I have reviewed and confirmed the accuracy of the ROS as documented by the MA/LPN/RN Harika Hein MD      Objective   Blood pressure 136/79, pulse 58, temperature 98.2 °F (36.8 °C), temperature source Temporal, resp. rate 16, height 167.6 cm (65.98\"), weight 104 kg (229 lb 4.8 oz), SpO2 95 %.   Physical Exam   Constitutional: He is oriented to person, place, and time. He appears well-developed and well-nourished. No distress.   HENT:   Head: Normocephalic and atraumatic.   Right Ear: External ear normal.   Left Ear: External ear normal.   Nose: Nose normal.   Mouth/Throat: Oropharynx is clear and moist. No oropharyngeal exudate.   Eyes: Pupils are equal, round, and reactive to light. Conjunctivae are normal. Right eye exhibits no discharge. Left eye exhibits no discharge. No scleral icterus.   Neck: No JVD present. No tracheal deviation present. No thyromegaly present.   Cardiovascular: Normal rate, regular rhythm and normal heart sounds. Exam reveals no gallop and no friction rub.   No murmur heard.  Pulmonary/Chest: Effort normal and breath sounds normal. No stridor. No respiratory distress. He has no " wheezes. He has no rales. He exhibits no tenderness.   Abdominal: Soft. Bowel sounds are normal. He exhibits no distension and no mass. There is no abdominal tenderness. There is no rebound and no guarding.   Musculoskeletal: Normal range of motion. No tenderness or deformity.   Lymphadenopathy:     He has no cervical adenopathy.   Neurological: He is alert and oriented to person, place, and time. No cranial nerve deficit. Coordination normal.   Skin: Skin is warm and dry. No rash noted. He is not diaphoretic. No erythema. No pallor.   Psychiatric: His behavior is normal. Judgment and thought content normal.   Vitals reviewed.    I have reexamined the patient and the results are consistent with the previously documented exam. Harika Hein MD     Lab on 04/15/2021   Component Date Value Ref Range Status   • WBC 04/15/2021 7.53  3.40 - 10.80 10*3/mm3 Final   • RBC 04/15/2021 4.49  4.14 - 5.80 10*6/mm3 Final   • Hemoglobin 04/15/2021 13.7  13.0 - 17.7 g/dL Final   • Hematocrit 04/15/2021 41.8  37.5 - 51.0 % Final   • MCV 04/15/2021 93.1  79.0 - 97.0 fL Final   • MCH 04/15/2021 30.5  26.6 - 33.0 pg Final   • MCHC 04/15/2021 32.8  31.5 - 35.7 g/dL Final   • RDW 04/15/2021 11.9* 12.3 - 15.4 % Final   • RDW-SD 04/15/2021 41.2  37.0 - 54.0 fl Final   • MPV 04/15/2021 8.8  6.0 - 12.0 fL Final   • Platelets 04/15/2021 246  140 - 450 10*3/mm3 Final   • Neutrophil % 04/15/2021 58.0  42.7 - 76.0 % Final   • Lymphocyte % 04/15/2021 26.4  19.6 - 45.3 % Final   • Monocyte % 04/15/2021 11.3  5.0 - 12.0 % Final   • Eosinophil % 04/15/2021 2.7  0.3 - 6.2 % Final   • Basophil % 04/15/2021 1.2  0.0 - 1.5 % Final   • Immature Grans % 04/15/2021 0.4  0.0 - 0.5 % Final   • Neutrophils, Absolute 04/15/2021 4.37  1.70 - 7.00 10*3/mm3 Final   • Lymphocytes, Absolute 04/15/2021 1.99  0.70 - 3.10 10*3/mm3 Final   • Monocytes, Absolute 04/15/2021 0.85  0.10 - 0.90 10*3/mm3 Final   • Eosinophils, Absolute 04/15/2021 0.20  0.00 - 0.40 10*3/mm3  Final   • Basophils, Absolute 04/15/2021 0.09  0.00 - 0.20 10*3/mm3 Final   • Immature Grans, Absolute 04/15/2021 0.03  0.00 - 0.05 10*3/mm3 Final   • nRBC 04/15/2021 0.0  0.0 - 0.2 /100 WBC Final        No radiology results for the last 30 days.     4/15/2021 labs reviewed  WBC 7.53, hemoglobin 13.7 which is normal, platelets 246  CMP shows a mildly elevated BUN of 27, creatinine elevated above baseline at 1.21, glucose 101, LFTs normal  Iron profile shows a normal iron of 111, iron saturation 30%, transferrin 252, TIBC 375  Ferritin 32.8      Assessment/Plan      Mr. Holder is a 75-year-old  male referred for further work-up of anemia.  On reviewing the labs he is noted to have low hemoglobin since May 2019.  Ferritin was normal in the past however most recent ferritin from 7/8/2020 noted to be low at 12.  This is indicative of iron deficiency.  He has been on oral iron and has had a good response.    1.anemia  Work-up consistent with iron deficiency   Hemoglobin and iron studies normal today.  Had a colonoscopy in December 2020 which does not show any evidence of bleeding  He has been off of oral iron for about 3 to 4 months now.  Since ferritin is in the low 30s would recommend resuming oral iron.  He might need a EGD given there was no clear explanation for drop in hemoglobin in June 2020.  Message sent to Dr. Roper.    2.hypertension-blood pressure well controlled, continue current medications, patient has gained some weight since his last visit.  Discussed weight management    3.GI work-up for iron deficiency anemia-C-scope December 2020 with no evidence of bleeding.  Will need an EGD.    4.hyperlipidemia , continue atorvastatin, stable    5.mood-continue Zoloft, stable    6.follow-up-6 months with labs

## 2021-04-22 ENCOUNTER — OFFICE VISIT (OUTPATIENT)
Dept: GASTROENTEROLOGY | Facility: CLINIC | Age: 76
End: 2021-04-22

## 2021-04-22 ENCOUNTER — TRANSCRIBE ORDERS (OUTPATIENT)
Dept: ADMINISTRATIVE | Facility: HOSPITAL | Age: 76
End: 2021-04-22

## 2021-04-22 VITALS — WEIGHT: 228 LBS | TEMPERATURE: 97.8 F | HEIGHT: 66 IN | BODY MASS INDEX: 36.64 KG/M2

## 2021-04-22 DIAGNOSIS — K21.9 GASTROESOPHAGEAL REFLUX DISEASE WITHOUT ESOPHAGITIS: ICD-10-CM

## 2021-04-22 DIAGNOSIS — Z01.818 OTHER SPECIFIED PRE-OPERATIVE EXAMINATION: Primary | ICD-10-CM

## 2021-04-22 DIAGNOSIS — Z87.19 HISTORY OF ACUTE GASTRITIS: ICD-10-CM

## 2021-04-22 DIAGNOSIS — D50.9 IRON DEFICIENCY ANEMIA, UNSPECIFIED IRON DEFICIENCY ANEMIA TYPE: Primary | ICD-10-CM

## 2021-04-22 DIAGNOSIS — D12.6 ADENOMATOUS POLYP OF COLON, UNSPECIFIED PART OF COLON: ICD-10-CM

## 2021-04-22 PROCEDURE — 99214 OFFICE O/P EST MOD 30 MIN: CPT | Performed by: NURSE PRACTITIONER

## 2021-04-22 NOTE — PROGRESS NOTES
Chief Complaint   Patient presents with   • Anemia       Dick Galindo is a  75 y.o. male here for a follow up visit for anemia.    HPI  75-year-old male presents today for follow-up visit for iron deficiency anemia.  He is a patient of Dr. Roper.  He was last seen in the office on 1/18/2021.  He is new to me today.  He has a history of GERD and admits he is done really well with Pepcid 20 mg once daily OTC.  He tells me at one point he was taking Prevacid over-the-counter but it seemed to lose its effectiveness so he switched to Pepcid.  His last EGD was done in 2018.  At that time he was found to have gastritis.  Path was negative.  Most recently he was diagnosed with iron deficiency anemia and is following up with a hematologist for further work-up.  He is currently on a daily fiber supplement.  Most recent hemoglobin is stable.  He is here today to schedule an EGD for further evaluation of his anemia.  He underwent colonoscopy on 12/2020.  He does have a history of adenomatous colon polyps.  He denies any dysphagia, reflux, abdominal pain, nausea and vomiting, diarrhea, constipation, bleeding or melena.  He admits his appetite is good and his weight is stable.  He denies any significant GI family history at this time.  Past Medical History:   Diagnosis Date   • Anxiety    • Asthma    • Colon polyps    • Disease of thyroid gland    • Enlarged prostate    • High cholesterol    • History of anemia    • Hypertension    • Sleep apnea     CPAP   • Slow urinary stream        Past Surgical History:   Procedure Laterality Date   • COLONOSCOPY  approx 2012    • COLONOSCOPY N/A 2/6/2018    Procedure: COLONOSCOPY INTO CEECUM AND TERMINAL MILEUM WITH COLD BIOPSY POLYPECTOMIES & COLD BIOIPSIES;  Surgeon: Usman Roper MD;  Location: [facility] ENDOSCOPY;  Service:    • COLONOSCOPY N/A 12/3/2020    Procedure: COLONOSCOPY TO CECUM AND INTO TI WITH COLD BIOPSIES AND COLD BIOPSY POLYPECTOMY;  Surgeon: Usman Roper MD;  Location:   "[facility] ENDOSCOPY;  Service: Gastroenterology;  Laterality: N/A;  pre: history of colon polyps  post: diverticulosis, polyp, and internal hemorrhoids   • CYSTOSCOPY TRANSURETHRAL RESECTION OF PROSTATE N/A 7/6/2020    Procedure: TRANSURETHRAL RESECTION OF PROSTATE;  Surgeon: Charanjit Cameron MD;  Location: [facility] MAIN OR;  Service: Urology;  Laterality: N/A;   • ENDOSCOPY N/A 2/6/2018    Procedure: ESOPHAGOGASTRODUODENOSCOPY WITH COLD BIOPSIES;  Surgeon: Usman Roper MD;  Location: [facility] ENDOSCOPY;  Service:    • TONSILLECTOMY         Scheduled Meds:    Continuous Infusions:No current facility-administered medications for this visit.      PRN Meds:.    Allergies   Allergen Reactions   • Amoxicillin Itching   • Levaquin [Levofloxacin] Nausea And Vomiting       Social History     Socioeconomic History   • Marital status:      Spouse name: Not on file   • Number of children: Not on file   • Years of education: Not on file   • Highest education level: Not on file   Tobacco Use   • Smoking status: Former Smoker   • Smokeless tobacco: Never Used   • Tobacco comment: He smoked between the ages of 18 and 32.   Substance and Sexual Activity   • Alcohol use: Yes     Alcohol/week: 1.0 standard drinks     Types: 1 Glasses of wine per week     Comment: \"not heavy\"   • Drug use: No       Family History   Problem Relation Age of Onset   • Stroke Father    • Heart attack Father    • No Known Problems Maternal Grandmother    • No Known Problems Maternal Grandfather    • No Known Problems Paternal Grandmother    • No Known Problems Paternal Grandfather    • Malig Hyperthermia Neg Hx        Review of Systems   Constitutional: Negative for appetite change, chills, diaphoresis, fatigue, fever and unexpected weight change.   HENT: Negative for nosebleeds, postnasal drip, sore throat, trouble swallowing and voice change.    Respiratory: Negative for cough, choking, chest tightness, shortness of breath and wheezing.  "   Cardiovascular: Negative for chest pain, palpitations and leg swelling.   Gastrointestinal: Negative for abdominal distention, abdominal pain, anal bleeding, blood in stool, constipation, diarrhea, nausea, rectal pain and vomiting.   Endocrine: Negative for polydipsia, polyphagia and polyuria.   Musculoskeletal: Negative for gait problem.   Skin: Negative for rash and wound.   Allergic/Immunologic: Negative for food allergies.   Neurological: Negative for dizziness, speech difficulty and light-headedness.   Psychiatric/Behavioral: Negative for confusion, self-injury, sleep disturbance and suicidal ideas.       Vitals:    04/22/21 1321   Temp: 97.8 °F (36.6 °C)       Physical Exam  Constitutional:       General: He is not in acute distress.     Appearance: He is well-developed. He is not ill-appearing.   HENT:      Head: Normocephalic.   Eyes:      Pupils: Pupils are equal, round, and reactive to light.   Cardiovascular:      Rate and Rhythm: Normal rate and regular rhythm.      Heart sounds: Normal heart sounds.   Pulmonary:      Effort: Pulmonary effort is normal.      Breath sounds: Normal breath sounds.   Abdominal:      General: Bowel sounds are normal. There is no distension.      Palpations: Abdomen is soft. There is no mass.      Tenderness: There is no abdominal tenderness. There is no guarding or rebound.      Hernia: No hernia is present.   Musculoskeletal:         General: Normal range of motion.   Skin:     General: Skin is warm and dry.   Neurological:      Mental Status: He is alert and oriented to person, place, and time.   Psychiatric:         Speech: Speech normal.         Behavior: Behavior normal.         Judgment: Judgment normal.         No radiology results for the last 7 days     Assessment and plan     1. Iron deficiency anemia, unspecified iron deficiency anemia type  - Case Request; Standing  - Case Request    2. Gastroesophageal reflux disease without esophagitis  - Case Request;  Standing  - Case Request    3. History of acute gastritis  - Case Request; Standing  - Case Request    4. Adenomatous polyp of colon, unspecified part of colon    Reviewed most recent blood work results with him today.  Also reviewed his hematology notes.  Given his newfound iron deficiency anemia he requires an EGD for further evaluation.  I will go ahead and have him schedule that today with Dr. Roper.  Overall he seems to be doing quite well.  GERD seems well controlled on Pepcid 20 mg OTC daily.  Continue GERD precautions.  Bowels are moving well.  No signs of GI bleeding noted.  Patient to call the office with any issues.  Patient to follow-up with us after his scope.  Patient is agreeable to the plan.

## 2021-05-17 RX ORDER — LISINOPRIL 40 MG/1
40 TABLET ORAL DAILY
Qty: 90 TABLET | Refills: 1 | Status: SHIPPED | OUTPATIENT
Start: 2021-05-17 | End: 2021-12-12 | Stop reason: SDUPTHER

## 2021-05-17 RX ORDER — ATORVASTATIN CALCIUM 40 MG/1
40 TABLET, FILM COATED ORAL DAILY
Qty: 90 TABLET | Refills: 1 | Status: SHIPPED | OUTPATIENT
Start: 2021-05-17 | End: 2021-11-28 | Stop reason: SDUPTHER

## 2021-05-17 RX ORDER — HYDROCHLOROTHIAZIDE 12.5 MG/1
12.5 TABLET ORAL DAILY
Qty: 90 TABLET | Refills: 1 | Status: SHIPPED | OUTPATIENT
Start: 2021-05-17 | End: 2021-10-17 | Stop reason: SDUPTHER

## 2021-05-17 RX ORDER — LEVOTHYROXINE SODIUM 0.05 MG/1
50 TABLET ORAL DAILY
Qty: 90 TABLET | Refills: 1 | Status: SHIPPED | OUTPATIENT
Start: 2021-05-17 | End: 2021-06-02

## 2021-05-17 RX ORDER — CARVEDILOL 6.25 MG/1
6.25 TABLET ORAL 2 TIMES DAILY WITH MEALS
Qty: 180 TABLET | Refills: 1 | Status: SHIPPED | OUTPATIENT
Start: 2021-05-17 | End: 2021-11-07 | Stop reason: SDUPTHER

## 2021-05-17 RX ORDER — AMLODIPINE BESYLATE 10 MG/1
10 TABLET ORAL DAILY
Qty: 90 TABLET | Refills: 1 | Status: SHIPPED | OUTPATIENT
Start: 2021-05-17 | End: 2021-10-17 | Stop reason: SDUPTHER

## 2021-05-21 ENCOUNTER — TRANSCRIBE ORDERS (OUTPATIENT)
Dept: SLEEP MEDICINE | Facility: HOSPITAL | Age: 76
End: 2021-05-21

## 2021-05-21 DIAGNOSIS — Z01.818 OTHER SPECIFIED PRE-OPERATIVE EXAMINATION: Primary | ICD-10-CM

## 2021-06-01 ENCOUNTER — LAB (OUTPATIENT)
Dept: LAB | Facility: HOSPITAL | Age: 76
End: 2021-06-01

## 2021-06-01 DIAGNOSIS — Z01.818 OTHER SPECIFIED PRE-OPERATIVE EXAMINATION: ICD-10-CM

## 2021-06-01 LAB — SARS-COV-2 ORF1AB RESP QL NAA+PROBE: NOT DETECTED

## 2021-06-01 PROCEDURE — U0004 COV-19 TEST NON-CDC HGH THRU: HCPCS

## 2021-06-01 PROCEDURE — C9803 HOPD COVID-19 SPEC COLLECT: HCPCS

## 2021-06-01 PROCEDURE — U0005 INFEC AGEN DETEC AMPLI PROBE: HCPCS

## 2021-06-02 ENCOUNTER — OFFICE VISIT (OUTPATIENT)
Dept: FAMILY MEDICINE CLINIC | Facility: CLINIC | Age: 76
End: 2021-06-02

## 2021-06-02 VITALS
SYSTOLIC BLOOD PRESSURE: 132 MMHG | TEMPERATURE: 97.3 F | HEART RATE: 62 BPM | HEIGHT: 66 IN | BODY MASS INDEX: 36.71 KG/M2 | WEIGHT: 228.4 LBS | OXYGEN SATURATION: 98 % | DIASTOLIC BLOOD PRESSURE: 78 MMHG

## 2021-06-02 DIAGNOSIS — I10 ESSENTIAL HYPERTENSION: Primary | Chronic | ICD-10-CM

## 2021-06-02 DIAGNOSIS — D50.9 IRON DEFICIENCY ANEMIA, UNSPECIFIED IRON DEFICIENCY ANEMIA TYPE: Chronic | ICD-10-CM

## 2021-06-02 DIAGNOSIS — E03.8 SUBCLINICAL HYPOTHYROIDISM: Chronic | ICD-10-CM

## 2021-06-02 DIAGNOSIS — E78.01 FAMILIAL HYPERCHOLESTEROLEMIA: Chronic | ICD-10-CM

## 2021-06-02 PROBLEM — F33.42 RECURRENT MAJOR DEPRESSIVE DISORDER, IN FULL REMISSION (HCC): Chronic | Status: ACTIVE | Noted: 2017-05-05

## 2021-06-02 PROCEDURE — 99214 OFFICE O/P EST MOD 30 MIN: CPT | Performed by: FAMILY MEDICINE

## 2021-06-02 RX ORDER — LEVOTHYROXINE SODIUM 0.07 MG/1
75 TABLET ORAL DAILY
Qty: 90 TABLET | Refills: 1 | Status: SHIPPED | OUTPATIENT
Start: 2021-06-02 | End: 2021-10-17 | Stop reason: SDUPTHER

## 2021-06-02 NOTE — PROGRESS NOTES
"Chief Complaint  Hypertension (follow up )    Subjective          Dick Galindo presents to [facility]  History of Present Illness     Hypertension.  He continues lisinopril.  Carvedilol.  Amlodipine.  We added hydrochlorothiazide 12.5 mg a day to his regimen.  This was 6 months ago.  Blood pressures running normal at home.  He feels fine.  Does not know he is taking the diuretic.  No lightheadedness.  No urinary issues.    Hyperlipidemia.  He continues on atorvastatin 40 mg a day.  His cholesterol panel is overall favorable with a higher HDL and a lower LDL.    Subclinical hypothyroidism.  He is on 50 mcg of levothyroxine daily.  His TSH is still somewhat high.  Tends to run on the higher side.  He has no symptoms of hypothyroidism.    Iron deficiency anemia.  His recent iron panel and ferritin level is normal.  But the ferritin has been coming down slightly.  His hematologist wants him to have a EGD done which will be done tomorrow by his GI doctor.  He had a colonoscopy done about 6 months ago which demonstrated no significant pathology.  He feels fine.  No recent anemia.  No dyspepsia.  No abdominal pain.  No change in stool.      Objective   Vital Signs:   /78   Pulse 62   Temp 97.3 °F (36.3 °C) (Temporal)   Ht 167.6 cm (65.98\")   Wt 104 kg (228 lb 6.4 oz)   SpO2 98%   BMI 36.88 kg/m²     Physical Exam  Vitals and nursing note reviewed.   Constitutional:       General: He is not in acute distress.     Appearance: He is well-developed.   Cardiovascular:      Rate and Rhythm: Normal rate and regular rhythm.      Heart sounds: Normal heart sounds.   Pulmonary:      Effort: Pulmonary effort is normal.      Breath sounds: Normal breath sounds.   Skin:     General: Skin is warm and dry.   Psychiatric:         Behavior: Behavior normal.         Thought Content: Thought content normal.         Judgment: Judgment normal.        Result Review :   The following data was " reviewed by: Mynor Russ MD on 06/02/2021:  Common labs    Common Labsle 11/23/20 11/23/20 4/15/21 4/15/21 5/26/21 5/26/21    1054 1054 1248 1248 0852 0852   Glucose    101 (A)     Glucose 82    92    BUN 18   27 (A) 26 (A)    Creatinine 0.81   1.21 0.95    eGFR Non  Am 93   59 (A) 78    eGFR  Am 113    94    Sodium 136   143 144    Potassium 4.0   4.5 4.2    Chloride 98   102 107    Calcium 9.4   9.5 9.6    Total Protein 6.9    6.7    Albumin 4.40   4.10 4.30    Total Bilirubin 0.5   0.5 0.4    Alkaline Phosphatase 76   74 75    AST (SGOT) 21   26 20    ALT (SGPT) 30   27 24    WBC   7.53      Hemoglobin   13.7      Hematocrit   41.8      Platelets   246      Total Cholesterol  143    133   Triglycerides  85    93   HDL Cholesterol  66 (A)    50   LDL Cholesterol   61    65   (A) Abnormal value       Comments are available for some flowsheets but are not being displayed.                     Assessment and Plan    Diagnoses and all orders for this visit:    1. Essential hypertension (Primary)  -     Comprehensive Metabolic Panel; Future  -     Lipid Panel; Future    2. Familial hypercholesterolemia  -     Comprehensive Metabolic Panel; Future  -     Lipid Panel; Future    3. Subclinical hypothyroidism  -     CBC & Differential; Future  -     TSH Rfx On Abnormal To Free T4; Future    4. Iron deficiency anemia, unspecified iron deficiency anemia type  -     CBC & Differential; Future  -     Comprehensive Metabolic Panel; Future  -     Lipid Panel; Future    Other orders  -     levothyroxine (SYNTHROID, LEVOTHROID) 75 MCG tablet; Take 1 tablet by mouth Daily.  Dispense: 90 tablet; Refill: 1    Hypertension very well controlled since the addition of the hydrochlorothiazide.  Continue all medication as is.  Recent electrolytes and creatinine normal.  Follow-up in 6 months for annual Medicare wellness visit.    Ileal hyperlipidemia.  Continue atorvastatin.    Subclinical hypothyroidism.  TSH is running  on the high side.  Increasing levothyroxine from 50 mcg a day up to 75 mcg a day.  Recheck TSH prior to next visit in 6 months.    Iron deficiency anemia.  Apparently resolved but the ferritin has been going down slightly.  In the low 30s.  I am going to check a stool for occult blood prior to his procedure or 2 weeks after the procedure.  He will have his EGD done tomorrow.  He had a colonoscopy which look good 6 months ago.  He continues to follow with GI and hematology.          Follow Up   No follow-ups on file.  Patient was given instructions and counseling regarding his condition or for health maintenance advice. Please see specific information pulled into the AVS if appropriate.

## 2021-06-03 ENCOUNTER — HOSPITAL ENCOUNTER (OUTPATIENT)
Facility: HOSPITAL | Age: 76
Setting detail: HOSPITAL OUTPATIENT SURGERY
Discharge: HOME | End: 2021-06-03
Attending: INTERNAL MEDICINE | Admitting: INTERNAL MEDICINE

## 2021-06-03 ENCOUNTER — ANESTHESIA EVENT (OUTPATIENT)
Dept: GASTROENTEROLOGY | Facility: HOSPITAL | Age: 76
End: 2021-06-03

## 2021-06-03 ENCOUNTER — ANESTHESIA (OUTPATIENT)
Dept: GASTROENTEROLOGY | Facility: HOSPITAL | Age: 76
End: 2021-06-03

## 2021-06-03 ENCOUNTER — CLINICAL SUPPORT (OUTPATIENT)
Dept: FAMILY MEDICINE CLINIC | Facility: CLINIC | Age: 76
End: 2021-06-03

## 2021-06-03 VITALS
RESPIRATION RATE: 14 BRPM | SYSTOLIC BLOOD PRESSURE: 132 MMHG | BODY MASS INDEX: 36 KG/M2 | TEMPERATURE: 98 F | HEART RATE: 61 BPM | DIASTOLIC BLOOD PRESSURE: 74 MMHG | OXYGEN SATURATION: 97 % | HEIGHT: 66 IN | WEIGHT: 224 LBS

## 2021-06-03 DIAGNOSIS — D50.9 IRON DEFICIENCY ANEMIA, UNSPECIFIED IRON DEFICIENCY ANEMIA TYPE: Primary | ICD-10-CM

## 2021-06-03 DIAGNOSIS — K21.9 GASTROESOPHAGEAL REFLUX DISEASE WITHOUT ESOPHAGITIS: ICD-10-CM

## 2021-06-03 DIAGNOSIS — D50.9 IRON DEFICIENCY ANEMIA, UNSPECIFIED IRON DEFICIENCY ANEMIA TYPE: ICD-10-CM

## 2021-06-03 DIAGNOSIS — Z87.19 HISTORY OF ACUTE GASTRITIS: ICD-10-CM

## 2021-06-03 PROCEDURE — 82274 ASSAY TEST FOR BLOOD FECAL: CPT | Performed by: FAMILY MEDICINE

## 2021-06-03 PROCEDURE — 25010000002 PROPOFOL 10 MG/ML EMULSION: Performed by: NURSE ANESTHETIST, CERTIFIED REGISTERED

## 2021-06-03 PROCEDURE — 88313 SPECIAL STAINS GROUP 2: CPT | Performed by: INTERNAL MEDICINE

## 2021-06-03 PROCEDURE — 43239 EGD BIOPSY SINGLE/MULTIPLE: CPT | Performed by: INTERNAL MEDICINE

## 2021-06-03 PROCEDURE — 88305 TISSUE EXAM BY PATHOLOGIST: CPT | Performed by: INTERNAL MEDICINE

## 2021-06-03 RX ORDER — GLYCOPYRROLATE 0.2 MG/ML
INJECTION INTRAMUSCULAR; INTRAVENOUS AS NEEDED
Status: DISCONTINUED | OUTPATIENT
Start: 2021-06-03 | End: 2021-06-03 | Stop reason: SURG

## 2021-06-03 RX ORDER — PROPOFOL 10 MG/ML
VIAL (ML) INTRAVENOUS AS NEEDED
Status: DISCONTINUED | OUTPATIENT
Start: 2021-06-03 | End: 2021-06-03 | Stop reason: SURG

## 2021-06-03 RX ORDER — LIDOCAINE HYDROCHLORIDE 20 MG/ML
INJECTION, SOLUTION INFILTRATION; PERINEURAL AS NEEDED
Status: DISCONTINUED | OUTPATIENT
Start: 2021-06-03 | End: 2021-06-03 | Stop reason: SURG

## 2021-06-03 RX ORDER — PROPOFOL 10 MG/ML
VIAL (ML) INTRAVENOUS CONTINUOUS PRN
Status: DISCONTINUED | OUTPATIENT
Start: 2021-06-03 | End: 2021-06-03 | Stop reason: SURG

## 2021-06-03 RX ORDER — SODIUM CHLORIDE, SODIUM LACTATE, POTASSIUM CHLORIDE, CALCIUM CHLORIDE 600; 310; 30; 20 MG/100ML; MG/100ML; MG/100ML; MG/100ML
30 INJECTION, SOLUTION INTRAVENOUS CONTINUOUS PRN
Status: DISCONTINUED | OUTPATIENT
Start: 2021-06-03 | End: 2021-06-03 | Stop reason: HOSPADM

## 2021-06-03 RX ADMIN — GLYCOPYRROLATE 0.2 MG: 0.2 INJECTION INTRAMUSCULAR; INTRAVENOUS at 14:50

## 2021-06-03 RX ADMIN — SODIUM CHLORIDE, POTASSIUM CHLORIDE, SODIUM LACTATE AND CALCIUM CHLORIDE 30 ML/HR: 600; 310; 30; 20 INJECTION, SOLUTION INTRAVENOUS at 14:26

## 2021-06-03 RX ADMIN — PROPOFOL 100 MG: 10 INJECTION, EMULSION INTRAVENOUS at 14:55

## 2021-06-03 RX ADMIN — LIDOCAINE HYDROCHLORIDE 60 MG: 20 INJECTION, SOLUTION INFILTRATION; PERINEURAL at 14:55

## 2021-06-03 RX ADMIN — PROPOFOL 160 MCG/KG/MIN: 10 INJECTION, EMULSION INTRAVENOUS at 14:56

## 2021-06-03 NOTE — H&P
Chief Complaint   Patient presents with   • Anemia         Dick Galindo is a  75 y.o. male here for a follow up visit for anemia.     HPI  75-year-old male presents today for follow-up visit for iron deficiency anemia.  He is a patient of Dr. Roper.  He was last seen in the office on 1/18/2021.  He is new to me today.  He has a history of GERD and admits he is done really well with Pepcid 20 mg once daily OTC.  He tells me at one point he was taking Prevacid over-the-counter but it seemed to lose its effectiveness so he switched to Pepcid.  His last EGD was done in 2018.  At that time he was found to have gastritis.  Path was negative.  Most recently he was diagnosed with iron deficiency anemia and is following up with a hematologist for further work-up.  He is currently on a daily fiber supplement.  Most recent hemoglobin is stable.  He is here today to schedule an EGD for further evaluation of his anemia.  He underwent colonoscopy on 12/2020.  He does have a history of adenomatous colon polyps.  He denies any dysphagia, reflux, abdominal pain, nausea and vomiting, diarrhea, constipation, bleeding or melena.  He admits his appetite is good and his weight is stable.  He denies any significant GI family history at this time.  Medical History        Past Medical History:   Diagnosis Date   • Anxiety     • Asthma     • Colon polyps     • Disease of thyroid gland     • Enlarged prostate     • High cholesterol     • History of anemia     • Hypertension     • Sleep apnea       CPAP   • Slow urinary stream              Surgical History         Past Surgical History:   Procedure Laterality Date   • COLONOSCOPY   approx 2012    • COLONOSCOPY N/A 2/6/2018     Procedure: COLONOSCOPY INTO CEECUM AND TERMINAL MILEUM WITH COLD BIOPSY POLYPECTOMIES & COLD BIOIPSIES;  Surgeon: Usman Roper MD;  Location: [facility] ENDOSCOPY;  Service:    • COLONOSCOPY N/A 12/3/2020     Procedure: COLONOSCOPY TO CECUM AND INTO TI WITH COLD BIOPSIES  "AND COLD BIOPSY POLYPECTOMY;  Surgeon: Usman Roper MD;  Location: [facility] ENDOSCOPY;  Service: Gastroenterology;  Laterality: N/A;  pre: history of colon polyps  post: diverticulosis, polyp, and internal hemorrhoids   • CYSTOSCOPY TRANSURETHRAL RESECTION OF PROSTATE N/A 7/6/2020     Procedure: TRANSURETHRAL RESECTION OF PROSTATE;  Surgeon: Charanjit Cameron MD;  Location: [facility] MAIN OR;  Service: Urology;  Laterality: N/A;   • ENDOSCOPY N/A 2/6/2018     Procedure: ESOPHAGOGASTRODUODENOSCOPY WITH COLD BIOPSIES;  Surgeon: Usman Roper MD;  Location: [facility] ENDOSCOPY;  Service:    • TONSILLECTOMY                Scheduled Meds:     Continuous Infusions:No current facility-administered medications for this visit.        PRN Meds:.          Allergies   Allergen Reactions   • Amoxicillin Itching   • Levaquin [Levofloxacin] Nausea And Vomiting         Social History   Social History            Socioeconomic History   • Marital status:        Spouse name: Not on file   • Number of children: Not on file   • Years of education: Not on file   • Highest education level: Not on file   Tobacco Use   • Smoking status: Former Smoker   • Smokeless tobacco: Never Used   • Tobacco comment: He smoked between the ages of 18 and 32.   Substance and Sexual Activity   • Alcohol use: Yes       Alcohol/week: 1.0 standard drinks       Types: 1 Glasses of wine per week       Comment: \"not heavy\"   • Drug use: No                  Family History   Problem Relation Age of Onset   • Stroke Father     • Heart attack Father     • No Known Problems Maternal Grandmother     • No Known Problems Maternal Grandfather     • No Known Problems Paternal Grandmother     • No Known Problems Paternal Grandfather     • Malig Hyperthermia Neg Hx           Review of Systems   Constitutional: Negative for appetite change, chills, diaphoresis, fatigue, fever and unexpected weight change.   HENT: Negative for nosebleeds, postnasal drip, sore throat, " trouble swallowing and voice change.    Respiratory: Negative for cough, choking, chest tightness, shortness of breath and wheezing.    Cardiovascular: Negative for chest pain, palpitations and leg swelling.   Gastrointestinal: Negative for abdominal distention, abdominal pain, anal bleeding, blood in stool, constipation, diarrhea, nausea, rectal pain and vomiting.   Endocrine: Negative for polydipsia, polyphagia and polyuria.   Musculoskeletal: Negative for gait problem.   Skin: Negative for rash and wound.   Allergic/Immunologic: Negative for food allergies.   Neurological: Negative for dizziness, speech difficulty and light-headedness.   Psychiatric/Behavioral: Negative for confusion, self-injury, sleep disturbance and suicidal ideas.             Vitals:     04/22/21 1321   Temp: 97.8 °F (36.6 °C)         Physical Exam  Constitutional:       General: He is not in acute distress.     Appearance: He is well-developed. He is not ill-appearing.   HENT:      Head: Normocephalic.   Eyes:      Pupils: Pupils are equal, round, and reactive to light.   Cardiovascular:      Rate and Rhythm: Normal rate and regular rhythm.      Heart sounds: Normal heart sounds.   Pulmonary:      Effort: Pulmonary effort is normal.      Breath sounds: Normal breath sounds.   Abdominal:      General: Bowel sounds are normal. There is no distension.      Palpations: Abdomen is soft. There is no mass.      Tenderness: There is no abdominal tenderness. There is no guarding or rebound.      Hernia: No hernia is present.   Musculoskeletal:         General: Normal range of motion.   Skin:     General: Skin is warm and dry.   Neurological:      Mental Status: He is alert and oriented to person, place, and time.   Psychiatric:         Speech: Speech normal.         Behavior: Behavior normal.         Judgment: Judgment normal.            No radiology results for the last 7 days      Assessment and plan      1. Iron deficiency anemia, unspecified iron  deficiency anemia type  - Case Request; Standing  - Case Request     2. Gastroesophageal reflux disease without esophagitis  - Case Request; Standing  - Case Request     3. History of acute gastritis  - Case Request; Standing  - Case Request     4. Adenomatous polyp of colon, unspecified part of colon     Reviewed most recent blood work results with him today.  Also reviewed his hematology notes.  Given his newfound iron deficiency anemia he requires an EGD for further evaluation.  I will go ahead and have him schedule that today with Dr. oRper.  Overall he seems to be doing quite well.  GERD seems well controlled on Pepcid 20 mg OTC daily.  Continue GERD precautions.  Bowels are moving well.  No signs of GI bleeding noted.  Patient to call the office with any issues.  Patient to follow-up with us after his scope.  Patient is agreeable to the plan.              6/3/21 - No change from the above H and BASIA.   Usman Roper M.D.

## 2021-06-03 NOTE — ANESTHESIA POSTPROCEDURE EVALUATION
"Patient: Dick Galindo    Procedure Summary     Date: 06/03/21 Room / Location: [facility] ENDOSCOPY 4 / [facility] ENDOSCOPY    Anesthesia Start: 1448 Anesthesia Stop: 1508    Procedure: ESOPHAGOGASTRODUODENOSCOPY WITH COLD BIOPSIES (N/A Esophagus) Diagnosis:       Iron deficiency anemia, unspecified iron deficiency anemia type      Gastroesophageal reflux disease without esophagitis      History of acute gastritis      (Iron deficiency anemia, unspecified iron deficiency anemia type [D50.9])      (Gastroesophageal reflux disease without esophagitis [K21.9])      (History of acute gastritis [Z87.19])    Surgeons: Usman Roper MD Provider: Aydin Anne MD    Anesthesia Type: MAC ASA Status: 3          Anesthesia Type: MAC    Vitals  Vitals Value Taken Time   /74 06/03/21 1527   Temp     Pulse 61 06/03/21 1527   Resp 14 06/03/21 1527   SpO2 97 % 06/03/21 1527           Post Anesthesia Care and Evaluation    Patient location during evaluation: PHASE II  Patient participation: complete - patient participated  Level of consciousness: awake  Pain management: adequate  Airway patency: patent  Anesthetic complications: No anesthetic complications    Cardiovascular status: acceptable  Respiratory status: acceptable  Hydration status: acceptable    Comments: /74 (BP Location: Left arm, Patient Position: Sitting)   Pulse 61   Temp 36.7 °C (98 °F) (Oral)   Resp 14   Ht 167.6 cm (66\")   Wt 102 kg (224 lb)   SpO2 97%   BMI 36.15 kg/m²       "

## 2021-06-03 NOTE — ANESTHESIA PREPROCEDURE EVALUATION
Anesthesia Evaluation     Patient summary reviewed and Nursing notes reviewed   NPO Solid Status: > 8 hours  NPO Liquid Status: > 2 hours           Airway   Mallampati: I  TM distance: >3 FB  Neck ROM: full  No difficulty expected  Dental - normal exam     Pulmonary - normal exam   (+) a smoker Former, asthma,sleep apnea,   Cardiovascular - normal exam    (+) hypertension, hyperlipidemia,       Neuro/Psych  (+) psychiatric history Anxiety and Depression,     GI/Hepatic/Renal/Endo    (+) obesity, morbid obesity, GERD,  renal disease,     Musculoskeletal (-) negative ROS    Abdominal  - normal exam    Bowel sounds: normal.   Substance History - negative use     OB/GYN negative ob/gyn ROS         Other                      Anesthesia Plan    ASA 3     MAC       Anesthetic plan, all risks, benefits, and alternatives have been provided, discussed and informed consent has been obtained with: patient.

## 2021-06-04 LAB
DEVELOPER EXPIRATION DATE: ABNORMAL
DEVELOPER LOT NUMBER: ABNORMAL
EXPIRATION DATE: ABNORMAL
FECAL OCCULT BLOOD SCREEN, POC: POSITIVE
Lab: ABNORMAL
NEGATIVE CONTROL: NEGATIVE
POSITIVE CONTROL: POSITIVE

## 2021-06-07 LAB
CYTO UR: NORMAL
LAB AP CASE REPORT: NORMAL
LAB AP SPECIAL STAINS: NORMAL
PATH REPORT.FINAL DX SPEC: NORMAL
PATH REPORT.GROSS SPEC: NORMAL

## 2021-06-13 NOTE — PROGRESS NOTES
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 I recommend that he have a repeat EGD in 3-5 years to make sure that the Castellon's esophagus is behaving itself?       Also I see that his stool hemoccult came back + for blood in the stool. He had a colonoscopy in 12/20 and just had this EGD. To evaluate his small bowel (to see if there is a bleeding source in his small bowel) we could do a Capsule Endoscopy or we could do a small bowel follow through? The Capsule Endoscopy is more sensitive. The only thing I don't like about the CApsule Endoscopy is that the capsule (that you have to swallow) could rarely get stuck in the small bowel and it may have to be taken out surgically? Have his think about these tests. When he sees Ms. Jonah NP in the office on 7/9/21 he could tell her which test he would want to have done to further evaluate his small bowel?       Please FAX a copy to his PCP.   Julia hartmann

## 2021-06-21 ENCOUNTER — TELEPHONE (OUTPATIENT)
Dept: GASTROENTEROLOGY | Facility: CLINIC | Age: 76
End: 2021-06-21

## 2021-06-21 NOTE — TELEPHONE ENCOUNTER
Called pt and advised of Dr Roper's note . Verb understanding.     Egd placed in recall and hm for 06/03/2024.     Results sent to Dr Russ thru [facility].

## 2021-06-21 NOTE — TELEPHONE ENCOUNTER
----- Message from Usman Roper MD sent at 6/13/2021  1:06 PM EDT -----  06/13/21       Tell him that path from his recent EGD showed evidence of short segment Castellon's esophagus (which is a complication of chronic GERD). I recommend that he have a repeat EGD in 3-5 years to make sure that the Castellon's esophagus is behaving itself?       Also I see that his stool hemoccult came back + for blood in the stool. He had a colonoscopy in 12/20 and just had this EGD. To evaluate his small bowel (to see if there is a bleeding source in his small bowel) we could do a Capsule Endoscopy or we could do a small bowel follow through? The Capsule Endoscopy is more sensitive. The only thing I don't like about the CApsule Endoscopy is that the capsule (that you have to swallow) could rarely get stuck in the small bowel and it may have to be taken out surgically? Have his think about these tests. When he sees Ms. Jonah NP in the office on 7/9/21 he could tell her which test he would want to have done to further evaluate his small bowel?       Please FAX a copy to his PCP.   Ramsey. [email or web address]

## 2021-06-23 PROBLEM — K22.719 BARRETT'S ESOPHAGUS WITH DYSPLASIA: Status: ACTIVE | Noted: 2021-06-23

## 2021-07-09 ENCOUNTER — OFFICE VISIT (OUTPATIENT)
Dept: GASTROENTEROLOGY | Facility: CLINIC | Age: 76
End: 2021-07-09

## 2021-07-09 VITALS — BODY MASS INDEX: 36.45 KG/M2 | TEMPERATURE: 98 F | HEIGHT: 66 IN | WEIGHT: 226.8 LBS

## 2021-07-09 DIAGNOSIS — K21.9 GASTROESOPHAGEAL REFLUX DISEASE WITHOUT ESOPHAGITIS: ICD-10-CM

## 2021-07-09 DIAGNOSIS — K22.719 BARRETT'S ESOPHAGUS WITH DYSPLASIA: ICD-10-CM

## 2021-07-09 DIAGNOSIS — R19.5 HEME POSITIVE STOOL: ICD-10-CM

## 2021-07-09 DIAGNOSIS — Z87.19 HISTORY OF ACUTE GASTRITIS: ICD-10-CM

## 2021-07-09 DIAGNOSIS — D50.9 IRON DEFICIENCY ANEMIA, UNSPECIFIED IRON DEFICIENCY ANEMIA TYPE: Primary | Chronic | ICD-10-CM

## 2021-07-09 LAB
BASOPHILS # BLD AUTO: 0.1 10*3/MM3 (ref 0–0.2)
BASOPHILS NFR BLD AUTO: 1.5 % (ref 0–1.5)
EOSINOPHIL # BLD AUTO: 0.18 10*3/MM3 (ref 0–0.4)
EOSINOPHIL NFR BLD AUTO: 2.6 % (ref 0.3–6.2)
ERYTHROCYTE [DISTWIDTH] IN BLOOD BY AUTOMATED COUNT: 12.3 % (ref 12.3–15.4)
HCT VFR BLD AUTO: 45.8 % (ref 37.5–51)
HGB BLD-MCNC: 15.5 G/DL (ref 13–17.7)
IMM GRANULOCYTES # BLD AUTO: 0.02 10*3/MM3 (ref 0–0.05)
IMM GRANULOCYTES NFR BLD AUTO: 0.3 % (ref 0–0.5)
IRON SATN MFR SERPL: 25 % (ref 20–50)
IRON SERPL-MCNC: 90 MCG/DL (ref 59–158)
LYMPHOCYTES # BLD AUTO: 1.93 10*3/MM3 (ref 0.7–3.1)
LYMPHOCYTES NFR BLD AUTO: 28.4 % (ref 19.6–45.3)
MCH RBC QN AUTO: 31.3 PG (ref 26.6–33)
MCHC RBC AUTO-ENTMCNC: 33.8 G/DL (ref 31.5–35.7)
MCV RBC AUTO: 92.3 FL (ref 79–97)
MONOCYTES # BLD AUTO: 0.73 10*3/MM3 (ref 0.1–0.9)
MONOCYTES NFR BLD AUTO: 10.7 % (ref 5–12)
NEUTROPHILS # BLD AUTO: 3.84 10*3/MM3 (ref 1.7–7)
NEUTROPHILS NFR BLD AUTO: 56.5 % (ref 42.7–76)
NRBC BLD AUTO-RTO: 0 /100 WBC (ref 0–0.2)
PLATELET # BLD AUTO: 244 10*3/MM3 (ref 140–450)
RBC # BLD AUTO: 4.96 10*6/MM3 (ref 4.14–5.8)
TIBC SERPL-MCNC: 366 MCG/DL
UIBC SERPL-MCNC: 276 MCG/DL (ref 112–346)
WBC # BLD AUTO: 6.8 10*3/MM3 (ref 3.4–10.8)

## 2021-07-09 PROCEDURE — 99214 OFFICE O/P EST MOD 30 MIN: CPT | Performed by: NURSE PRACTITIONER

## 2021-07-09 RX ORDER — LANSOPRAZOLE 30 MG/1
30 CAPSULE, DELAYED RELEASE ORAL DAILY
Qty: 90 CAPSULE | Refills: 3 | Status: SHIPPED | OUTPATIENT
Start: 2021-07-09 | End: 2021-11-15 | Stop reason: SDUPTHER

## 2021-07-09 RX ORDER — LANSOPRAZOLE 15 MG/1
15 CAPSULE, DELAYED RELEASE ORAL DAILY
COMMUNITY
End: 2021-07-09 | Stop reason: SDUPTHER

## 2021-07-09 NOTE — PROGRESS NOTES
Chief Complaint   Patient presents with   • Anemia   • Heartburn       Dick Galindo is a  76 y.o. male here for a follow up visit for GERD.    HPI  76-year-old male presents today for follow-up visit for GERD and anemia.  He is a patient of Dr. Roper.  He was last seen in the office by me on 4/22/2021.  he underwent EGD on 6/3/2021.  It did show gastritis and path was positive for Castellon's esophagus short segment without dysplasia.  At the time he was taking Pepcid 40 mg twice a day over-the-counter but he tells me it just really was not doing much.  Dr. Roper did put him on Prevacid and the patient's been taking 15 mg over-the-counter once a day.  He still having some breakthrough reflux symptoms.  He also has a history of iron deficiency anemia and has been getting work-up for that.  He did have a stool study done which showed positive for Hemoccult stool test.  His last colonoscopy was on 12/2020.  Dr. Roper had told him in his last visit that due to the heme positive stool it required further work-up either with a small bowel follow-through or a video capsule study.  He wanted the patient to talk it over think it over and then talk to me about it today.  At this point the patient tells me he is not seeing any signs of rectal bleeding.  He does have internal and external hemorrhoids that pop up from time to time and cause some bleeding.  He has seen a hematologist and is on iron supplementation every other day.  He has been told that his ferritin levels were low.  He tells me at this point he would rather check his hemoglobin and his iron level and hold off on any more procedures or testing.  He tells me his bowels are moving really well with 3 fiber supplements a day.  He denies any dysphagia, abdominal pain, nausea and vomiting, diarrhea, constipation, rectal bleeding or melena.  He admits his appetite is good and his weight is stable.  Past Medical History:   Diagnosis Date   • Anxiety    • Asthma     mild  case, allergies   • Bronchitis    • Colon polyps    • Disease of thyroid gland     hypothyroid   • Enlarged prostate    • GERD (gastroesophageal reflux disease)    • High cholesterol    • History of anemia    • Hypertension    • Seasonal allergies    • Sleep apnea     CPAP   • Slow urinary stream        Past Surgical History:   Procedure Laterality Date   • COLONOSCOPY  approx 2012    • COLONOSCOPY N/A 2/6/2018    Procedure: COLONOSCOPY INTO CEECUM AND TERMINAL MILEUM WITH COLD BIOPSY POLYPECTOMIES & COLD BIOIPSIES;  Surgeon: Usman Roper MD;  Location: [facility] ENDOSCOPY;  Service:    • COLONOSCOPY N/A 12/3/2020    Procedure: COLONOSCOPY TO CECUM AND INTO TI WITH COLD BIOPSIES AND COLD BIOPSY POLYPECTOMY;  Surgeon: Usman Roper MD;  Location: [facility] ENDOSCOPY;  Service: Gastroenterology;  Laterality: N/A;  pre: history of colon polyps  post: diverticulosis, polyp, and internal hemorrhoids   • CYSTOSCOPY TRANSURETHRAL RESECTION OF PROSTATE N/A 7/6/2020    Procedure: TRANSURETHRAL RESECTION OF PROSTATE;  Surgeon: Charanjit Cameron MD;  Location: [facility] MAIN OR;  Service: Urology;  Laterality: N/A;   • ENDOSCOPY N/A 2/6/2018    Procedure: ESOPHAGOGASTRODUODENOSCOPY WITH COLD BIOPSIES;  Surgeon: Usman Roper MD;  Location: [facility] ENDOSCOPY;  Service:    • ENDOSCOPY N/A 6/3/2021    Procedure: ESOPHAGOGASTRODUODENOSCOPY WITH COLD BIOPSIES;  Surgeon: Usman Roper MD;  Location: [facility] ENDOSCOPY;  Service: Gastroenterology;  Laterality: N/A;  PRE:IRON DEFICIENCY ANEMIA  POST: GASTRITIS   • TONSILLECTOMY         Scheduled Meds:    Continuous Infusions:No current facility-administered medications for this visit.      PRN Meds:.    Allergies   Allergen Reactions   • Amoxicillin Itching   • Levaquin [Levofloxacin] Nausea And Vomiting       Social History     Socioeconomic History   • Marital status:      Spouse name: Not on file   • Number of children: Not on file   • Years of education: Not on file   • Highest  "education level: Not on file   Tobacco Use   • Smoking status: Former Smoker   • Smokeless tobacco: Never Used   • Tobacco comment: He smoked between the ages of 18 and 32.   Vaping Use   • Vaping Use: Never used   Substance and Sexual Activity   • Alcohol use: Yes     Alcohol/week: 1.0 standard drinks     Types: 1 Glasses of wine per week     Comment: \"not heavy\"   • Drug use: No       Family History   Problem Relation Age of Onset   • Stroke Father    • Heart attack Father    • No Known Problems Maternal Grandmother    • No Known Problems Maternal Grandfather    • No Known Problems Paternal Grandmother    • No Known Problems Paternal Grandfather    • Malig Hyperthermia Neg Hx        Review of Systems   Constitutional: Negative for appetite change, chills, diaphoresis, fatigue, fever and unexpected weight change.   HENT: Negative for nosebleeds, postnasal drip, sore throat, trouble swallowing and voice change.    Respiratory: Negative for cough, choking, chest tightness, shortness of breath and wheezing.    Cardiovascular: Negative for chest pain, palpitations and leg swelling.   Gastrointestinal: Negative for abdominal distention, abdominal pain, anal bleeding, blood in stool, constipation, diarrhea, nausea, rectal pain and vomiting.   Endocrine: Negative for polydipsia, polyphagia and polyuria.   Musculoskeletal: Negative for gait problem.   Skin: Negative for rash and wound.   Allergic/Immunologic: Negative for food allergies.   Neurological: Negative for dizziness, speech difficulty and light-headedness.   Psychiatric/Behavioral: Negative for confusion, self-injury, sleep disturbance and suicidal ideas.       Vitals:    07/09/21 1025   Temp: 98 °F (36.7 °C)       Physical Exam  Constitutional:       General: He is not in acute distress.     Appearance: He is well-developed. He is not ill-appearing.   HENT:      Head: Normocephalic.   Eyes:      Pupils: Pupils are equal, round, and reactive to light. "   Cardiovascular:      Rate and Rhythm: Normal rate and regular rhythm.      Heart sounds: Normal heart sounds.   Pulmonary:      Effort: Pulmonary effort is normal.      Breath sounds: Normal breath sounds.   Abdominal:      General: Bowel sounds are normal. There is no distension.      Palpations: Abdomen is soft. There is no mass.      Tenderness: There is no abdominal tenderness. There is no guarding or rebound.      Hernia: No hernia is present.   Musculoskeletal:         General: Normal range of motion.   Skin:     General: Skin is warm and dry.   Neurological:      Mental Status: He is alert and oriented to person, place, and time.   Psychiatric:         Speech: Speech normal.         Behavior: Behavior normal.         Judgment: Judgment normal.         No radiology results for the last 7 days     Assessment and plan     1. Iron deficiency anemia, unspecified iron deficiency anemia type  - CBC & Differential  - Iron Profile    2. Heme positive stool  - CBC & Differential  - Iron Profile    3. Castellon's esophagus with dysplasia    4. Gastroesophageal reflux disease without esophagitis    5. History of acute gastritis      Reviewed EGD results with him today.  Most recent hemoglobin was stable at 13.7.  I discussed with him again the pros and cons of doing a small bowel follow-through versus a video capsule study.  At this point the patient really wants to wait.  He is okay with me repeating a CBC and iron profile today.  He is on iron every other day.  He has plans to follow-up with his hematologist.  At this point he is not having any signs of GI bleeding and his bowels are moving well on his fiber supplementation.  I will go ahead and put him on Prevacid 30 mg daily just to give him better coverage especially with his new history of Castellon's.  Continue GERD precautions.  We will also go ahead make referral to Dr. Aria Harrington for his hemorrhoids.  Patient to call the office with any issues.  Patient to  follow-up with me in 1 month.  Patient is agreeable to the plan.

## 2021-07-14 ENCOUNTER — TELEPHONE (OUTPATIENT)
Dept: GASTROENTEROLOGY | Facility: CLINIC | Age: 76
End: 2021-07-14

## 2021-07-14 NOTE — TELEPHONE ENCOUNTER
----- Message from JEFF Ibarra sent at 7/12/2021 11:30 AM EDT -----  Regarding: labs  Please let the patient know his labs are normal.  ----- Message -----  From: Elaina, Reflab Results In  Sent: 7/9/2021   8:08 PM EDT  To: JEFF Garcia

## 2021-07-18 DIAGNOSIS — J20.9 BRONCHOSPASM WITH BRONCHITIS, ACUTE: ICD-10-CM

## 2021-07-19 RX ORDER — ALBUTEROL SULFATE 90 UG/1
AEROSOL, METERED RESPIRATORY (INHALATION)
Qty: 18 G | Refills: 1 | Status: SHIPPED | OUTPATIENT
Start: 2021-07-19 | End: 2022-10-26 | Stop reason: SDUPTHER

## 2021-08-09 ENCOUNTER — OFFICE VISIT (OUTPATIENT)
Dept: GASTROENTEROLOGY | Facility: CLINIC | Age: 76
End: 2021-08-09

## 2021-08-09 VITALS — BODY MASS INDEX: 36.32 KG/M2 | TEMPERATURE: 97.5 F | HEIGHT: 66 IN | WEIGHT: 226 LBS

## 2021-08-09 DIAGNOSIS — K21.9 GASTROESOPHAGEAL REFLUX DISEASE WITHOUT ESOPHAGITIS: Primary | ICD-10-CM

## 2021-08-09 DIAGNOSIS — Z87.19 HISTORY OF ACUTE GASTRITIS: ICD-10-CM

## 2021-08-09 DIAGNOSIS — K22.719 BARRETT'S ESOPHAGUS WITH DYSPLASIA: ICD-10-CM

## 2021-08-09 DIAGNOSIS — K64.8 OTHER HEMORRHOIDS: ICD-10-CM

## 2021-08-09 DIAGNOSIS — D50.9 IRON DEFICIENCY ANEMIA, UNSPECIFIED IRON DEFICIENCY ANEMIA TYPE: Chronic | ICD-10-CM

## 2021-08-09 PROCEDURE — 99214 OFFICE O/P EST MOD 30 MIN: CPT | Performed by: NURSE PRACTITIONER

## 2021-08-09 NOTE — PROGRESS NOTES
Chief Complaint   Patient presents with   • Follow-up       Dick Galindo is a  76 y.o. male here for a follow up visit for GERD.    HPI  76-year-old male presents today for follow-up visit for GERD.  He is a patient of Dr. Roper.  He was last seen in the office by me on 7/9/2021.  He has a history of GERD/gastritis/Castellon's esophagus without dysplasia and admits he is doing really well on Prevacid 30 mg every morning.  He denies any breakthrough reflux at this time.  He also has a history of iron deficiency anemia and takes an iron supplement every other day.  His most recent hemoglobin was great at 15.5 earlier last month.  He does follow up with hematology this October.  He still is having issues occasionally with his hemorrhoids.  He was referred to Dr. Aria Harrington at last visit but he has yet to hear back from their office.  He tells me he would like to be referred elsewhere if possible.  He does have a history of colon polyps.  His last EGD was on 6/3/2021.  His last colonoscopy was on 12/2020.  He denies any dysphagia, reflux, abdominal pain, nausea and vomiting, diarrhea, constipation, rectal bleeding or melena.  He admits his appetite is good and his weight is stable.  Past Medical History:   Diagnosis Date   • Anxiety    • Asthma     mild case, allergies   • Bronchitis    • Colon polyps    • Disease of thyroid gland     hypothyroid   • Enlarged prostate    • GERD (gastroesophageal reflux disease)    • High cholesterol    • History of anemia    • Hypertension    • Seasonal allergies    • Sleep apnea     CPAP   • Slow urinary stream        Past Surgical History:   Procedure Laterality Date   • COLONOSCOPY  approx 2012    • COLONOSCOPY N/A 2/6/2018    Procedure: COLONOSCOPY INTO CEECUM AND TERMINAL MILEUM WITH COLD BIOPSY POLYPECTOMIES & COLD BIOIPSIES;  Surgeon: Usman Roper MD;  Location: [facility] ENDOSCOPY;  Service:    • COLONOSCOPY N/A 12/3/2020    Procedure: COLONOSCOPY TO CECUM AND INTO TI WITH COLD  "BIOPSIES AND COLD BIOPSY POLYPECTOMY;  Surgeon: Usman Roper MD;  Location: [facility] ENDOSCOPY;  Service: Gastroenterology;  Laterality: N/A;  pre: history of colon polyps  post: diverticulosis, polyp, and internal hemorrhoids   • CYSTOSCOPY TRANSURETHRAL RESECTION OF PROSTATE N/A 7/6/2020    Procedure: TRANSURETHRAL RESECTION OF PROSTATE;  Surgeon: Charanjit Cameron MD;  Location: [facility] MAIN OR;  Service: Urology;  Laterality: N/A;   • ENDOSCOPY N/A 2/6/2018    Procedure: ESOPHAGOGASTRODUODENOSCOPY WITH COLD BIOPSIES;  Surgeon: Usman Roper MD;  Location: [facility] ENDOSCOPY;  Service:    • ENDOSCOPY N/A 6/3/2021    Procedure: ESOPHAGOGASTRODUODENOSCOPY WITH COLD BIOPSIES;  Surgeon: Usman Roper MD;  Location: [facility] ENDOSCOPY;  Service: Gastroenterology;  Laterality: N/A;  PRE:IRON DEFICIENCY ANEMIA  POST: GASTRITIS   • TONSILLECTOMY     • UPPER GASTROINTESTINAL ENDOSCOPY  06/03/2021           Scheduled Meds:    Continuous Infusions:No current facility-administered medications for this visit.      PRN Meds:.    Allergies   Allergen Reactions   • Amoxicillin Itching   • Levaquin [Levofloxacin] Nausea And Vomiting       Social History     Socioeconomic History   • Marital status:      Spouse name: Not on file   • Number of children: Not on file   • Years of education: Not on file   • Highest education level: Not on file   Tobacco Use   • Smoking status: Former Smoker   • Smokeless tobacco: Never Used   • Tobacco comment: He smoked between the ages of 18 and 32.   Vaping Use   • Vaping Use: Never used   Substance and Sexual Activity   • Alcohol use: Yes     Comment: \"not heavy\"   • Drug use: No       Family History   Problem Relation Age of Onset   • Stroke Father    • Heart attack Father    • No Known Problems Maternal Grandmother    • No Known Problems Maternal Grandfather    • No Known Problems Paternal Grandmother    • No Known Problems Paternal Grandfather    • Malig Hyperthermia Neg Hx  "       Review of Systems   Constitutional: Negative for appetite change, chills, diaphoresis, fatigue, fever and unexpected weight change.   HENT: Negative for nosebleeds, postnasal drip, sore throat, trouble swallowing and voice change.    Respiratory: Negative for cough, choking, chest tightness, shortness of breath and wheezing.    Cardiovascular: Negative for chest pain, palpitations and leg swelling.   Gastrointestinal: Negative for abdominal distention, abdominal pain, anal bleeding, blood in stool, constipation, diarrhea, nausea, rectal pain and vomiting.   Endocrine: Negative for polydipsia, polyphagia and polyuria.   Musculoskeletal: Negative for gait problem.   Skin: Negative for rash and wound.   Allergic/Immunologic: Negative for food allergies.   Neurological: Negative for dizziness, speech difficulty and light-headedness.   Psychiatric/Behavioral: Negative for confusion, self-injury, sleep disturbance and suicidal ideas.       Vitals:    08/09/21 1133   Temp: 97.5 °F (36.4 °C)       Physical Exam  Constitutional:       General: He is not in acute distress.     Appearance: He is well-developed. He is not ill-appearing.   HENT:      Head: Normocephalic.   Eyes:      Pupils: Pupils are equal, round, and reactive to light.   Cardiovascular:      Rate and Rhythm: Normal rate and regular rhythm.      Heart sounds: Normal heart sounds.   Pulmonary:      Effort: Pulmonary effort is normal.      Breath sounds: Normal breath sounds.   Abdominal:      General: Bowel sounds are normal. There is no distension.      Palpations: Abdomen is soft. There is no mass.      Tenderness: There is no abdominal tenderness. There is no guarding or rebound.      Hernia: No hernia is present.   Musculoskeletal:         General: Normal range of motion.   Skin:     General: Skin is warm and dry.   Neurological:      Mental Status: He is alert and oriented to person, place, and time.   Psychiatric:         Speech: Speech normal.          Behavior: Behavior normal.         Judgment: Judgment normal.         No radiology results for the last 7 days     Assessment and plan     1. Gastroesophageal reflux disease without esophagitis    2. History of acute gastritis    3. Castellon's esophagus with dysplasia    4. Iron deficiency anemia, unspecified iron deficiency anemia type    5. Other hemorrhoids  - Ambulatory Referral to General Surgery    Reviewed most recent labs with him today.  Hemoglobin was very good at 15.5.  He can continue iron every other day or even go to every third day until he is seen by hematology in October.  For his hemorrhoids we will go ahead and refer to Dr. Key Bean for further evaluation.  Bowels are moving well currently.  GERD seems well controlled on Prevacid 30 mg daily.  Continue GERD precautions.  Overall he seems to be doing very well.  Patient to call the office with any issues.  Patient to follow-up with Dr. Roper in 3 to 4 months.  Patient is agreeable to the plan.

## 2021-08-23 ENCOUNTER — OFFICE VISIT (OUTPATIENT)
Dept: SURGERY | Facility: CLINIC | Age: 76
End: 2021-08-23

## 2021-08-23 VITALS — BODY MASS INDEX: 36.48 KG/M2 | HEIGHT: 66 IN | WEIGHT: 227 LBS

## 2021-08-23 DIAGNOSIS — K64.4 EXTERNAL HEMORRHOID: Primary | ICD-10-CM

## 2021-08-23 PROCEDURE — 99203 OFFICE O/P NEW LOW 30 MIN: CPT | Performed by: STUDENT IN AN ORGANIZED HEALTH CARE EDUCATION/TRAINING PROGRAM

## 2021-10-07 ENCOUNTER — OFFICE VISIT (OUTPATIENT)
Dept: ONCOLOGY | Facility: CLINIC | Age: 76
End: 2021-10-07

## 2021-10-07 ENCOUNTER — TELEPHONE (OUTPATIENT)
Dept: ONCOLOGY | Facility: CLINIC | Age: 76
End: 2021-10-07

## 2021-10-07 ENCOUNTER — LAB (OUTPATIENT)
Dept: OTHER | Facility: HOSPITAL | Age: 76
End: 2021-10-07

## 2021-10-07 VITALS
WEIGHT: 230.7 LBS | DIASTOLIC BLOOD PRESSURE: 79 MMHG | TEMPERATURE: 97.2 F | HEIGHT: 66 IN | HEART RATE: 63 BPM | SYSTOLIC BLOOD PRESSURE: 132 MMHG | RESPIRATION RATE: 16 BRPM | BODY MASS INDEX: 37.07 KG/M2 | OXYGEN SATURATION: 94 %

## 2021-10-07 DIAGNOSIS — D50.0 IRON DEFICIENCY ANEMIA DUE TO CHRONIC BLOOD LOSS: Primary | ICD-10-CM

## 2021-10-07 DIAGNOSIS — D50.0 IRON DEFICIENCY ANEMIA DUE TO CHRONIC BLOOD LOSS: ICD-10-CM

## 2021-10-07 LAB
ALBUMIN SERPL-MCNC: 4.2 G/DL (ref 3.5–5.2)
ALBUMIN/GLOB SERPL: 1.4 G/DL
ALP SERPL-CCNC: 77 U/L (ref 39–117)
ALT SERPL W P-5'-P-CCNC: 19 U/L (ref 1–41)
ANION GAP SERPL CALCULATED.3IONS-SCNC: 8.1 MMOL/L (ref 5–15)
AST SERPL-CCNC: 21 U/L (ref 1–40)
BASOPHILS # BLD AUTO: 0.07 10*3/MM3 (ref 0–0.2)
BASOPHILS NFR BLD AUTO: 1 % (ref 0–1.5)
BILIRUB SERPL-MCNC: 0.5 MG/DL (ref 0–1.2)
BUN SERPL-MCNC: 22 MG/DL (ref 8–23)
BUN/CREAT SERPL: 16.7 (ref 7–25)
CALCIUM SPEC-SCNC: 9.4 MG/DL (ref 8.6–10.5)
CHLORIDE SERPL-SCNC: 103 MMOL/L (ref 98–107)
CO2 SERPL-SCNC: 30.9 MMOL/L (ref 22–29)
CREAT SERPL-MCNC: 1.32 MG/DL (ref 0.76–1.27)
DEPRECATED RDW RBC AUTO: 42.1 FL (ref 37–54)
EOSINOPHIL # BLD AUTO: 0.18 10*3/MM3 (ref 0–0.4)
EOSINOPHIL NFR BLD AUTO: 2.6 % (ref 0.3–6.2)
ERYTHROCYTE [DISTWIDTH] IN BLOOD BY AUTOMATED COUNT: 12.5 % (ref 12.3–15.4)
FERRITIN SERPL-MCNC: 36.1 NG/ML (ref 30–400)
GFR SERPL CREATININE-BSD FRML MDRD: 53 ML/MIN/1.73
GLOBULIN UR ELPH-MCNC: 3.1 GM/DL
GLUCOSE SERPL-MCNC: 106 MG/DL (ref 65–99)
HCT VFR BLD AUTO: 43.5 % (ref 37.5–51)
HGB BLD-MCNC: 14.1 G/DL (ref 13–17.7)
IMM GRANULOCYTES # BLD AUTO: 0.01 10*3/MM3 (ref 0–0.05)
IMM GRANULOCYTES NFR BLD AUTO: 0.1 % (ref 0–0.5)
IRON 24H UR-MRATE: 71 MCG/DL (ref 59–158)
IRON SATN MFR SERPL: 21 % (ref 20–50)
LYMPHOCYTES # BLD AUTO: 1.5 10*3/MM3 (ref 0.7–3.1)
LYMPHOCYTES NFR BLD AUTO: 21.7 % (ref 19.6–45.3)
MCH RBC QN AUTO: 29.7 PG (ref 26.6–33)
MCHC RBC AUTO-ENTMCNC: 32.4 G/DL (ref 31.5–35.7)
MCV RBC AUTO: 91.8 FL (ref 79–97)
MONOCYTES # BLD AUTO: 0.62 10*3/MM3 (ref 0.1–0.9)
MONOCYTES NFR BLD AUTO: 9 % (ref 5–12)
NEUTROPHILS NFR BLD AUTO: 4.53 10*3/MM3 (ref 1.7–7)
NEUTROPHILS NFR BLD AUTO: 65.6 % (ref 42.7–76)
NRBC BLD AUTO-RTO: 0 /100 WBC (ref 0–0.2)
PLATELET # BLD AUTO: 234 10*3/MM3 (ref 140–450)
PMV BLD AUTO: 8.9 FL (ref 6–12)
POTASSIUM SERPL-SCNC: 4.4 MMOL/L (ref 3.5–5.2)
PROT SERPL-MCNC: 7.3 G/DL (ref 6–8.5)
RBC # BLD AUTO: 4.74 10*6/MM3 (ref 4.14–5.8)
SODIUM SERPL-SCNC: 142 MMOL/L (ref 136–145)
TIBC SERPL-MCNC: 337 MCG/DL (ref 298–536)
TRANSFERRIN SERPL-MCNC: 226 MG/DL (ref 200–360)
WBC # BLD AUTO: 6.91 10*3/MM3 (ref 3.4–10.8)

## 2021-10-07 PROCEDURE — 83540 ASSAY OF IRON: CPT | Performed by: INTERNAL MEDICINE

## 2021-10-07 PROCEDURE — 84466 ASSAY OF TRANSFERRIN: CPT | Performed by: INTERNAL MEDICINE

## 2021-10-07 PROCEDURE — 82728 ASSAY OF FERRITIN: CPT | Performed by: INTERNAL MEDICINE

## 2021-10-07 PROCEDURE — 80053 COMPREHEN METABOLIC PANEL: CPT | Performed by: INTERNAL MEDICINE

## 2021-10-07 PROCEDURE — 99214 OFFICE O/P EST MOD 30 MIN: CPT | Performed by: INTERNAL MEDICINE

## 2021-10-07 PROCEDURE — 85025 COMPLETE CBC W/AUTO DIFF WBC: CPT | Performed by: INTERNAL MEDICINE

## 2021-10-07 PROCEDURE — 36415 COLL VENOUS BLD VENIPUNCTURE: CPT

## 2021-10-07 NOTE — TELEPHONE ENCOUNTER
----- Message from Harika Hein MD sent at 10/7/2021  1:55 PM EDT -----  Please let him know that his iron studies were normal and ferritin still in the 30s.  Okay to come off oral iron and we will recheck iron studies and CBC in 6-month

## 2021-10-07 NOTE — PROGRESS NOTES
Subjective   Dick Galindo is a 76 y.o. male.  Referred by Dr. Rodriguez for evaluation and management of anemia    History of Present Illness   Mr. Dubois is a 75-year-old  male with history of hypertension, hyperlipidemia, BPH status post TURP on 7/7/2020 presents for further evaluation of anemia.  He was scheduled for the TURP procedure on 7/7/2020 at which point a CBC was checked and his hemoglobin was noted to be low at 9.2.  This prompted further work-up with vitamin B12, folic acid, iron studies.  The vitamin B12 and folic acid levels were noted to be normal.  Iron studies showed a low ferritin consistent with iron deficiency.  He was then placed on oral iron which he has been taking for the past 2 weeks.  Today a repeat CBC shows an improvement in hemoglobin to 11.2.  He denies any lightheadedness, dizziness, chest pain, dyspnea, palpitations, lower extremity edema.  He reports having lost about 10 pounds over the past 2 weeks, he attributes this to changes in his diet as well as decreased appetite since the surgery.  He denies noting any blood in his stool any melena.  Denies any hematemesis.  He has been on Prevacid for a long time now for GERD.  His last colonoscopy was in 2018 on which there was diverticulosis, polyps and internal hemorrhoids noted.  EGD was also performed in 2018 on which mildly erythematous mucosa of the esophagus was noted but no other abnormalities.  He was scheduled for a colonoscopy in February 2020 by Dr. Roper however this has been delayed because of COVID and he plans to undergo colonoscopy soon.  6/3/2021 EGD-Z-line is irregular, 39 cm from the incisors.  Biopsied  Erythematous mucosa in the stomach biopsied.  No gross lesions in the entire examined duodenum.  Stomach biopsies consistent with scattered changes suggestive of mild reactive gastropathy.  GE junction biopsy with mild esophagitis and focal goblet cell metaplasia Castellon's esophagus.    Interval history  Patient  presents today for follow-up.  He is on oral iron currently every other day and has been taking it for at least 6 months.  Prior to that he was off of it for about 3 to 4 months.  He has experienced some hemorrhoidal bleeding on it regular basis which stopped about 3 months ago.  Due to this he had seen Dr. Key Bean.  At the time of evaluation the hemorrhoids were thrombosed and hence no intervention was necessary.  He reports feeling well with no new complaints.      The following portions of the patient's history were reviewed and updated as appropriate: allergies, current medications, past family history, past medical history, past social history, past surgical history and problem list.    Past Medical History:   Diagnosis Date   • Anxiety    • Asthma     mild case, allergies   • Bronchitis    • Colon polyps    • Diverticulitis    • Enlarged prostate    • GERD (gastroesophageal reflux disease)    • H/O bladder infections    • H/O bronchitis    • High cholesterol    • History of anemia    • Hypertension    • Hypothyroidism    • Seasonal allergies    • Sleep apnea     CPAP   • Slow urinary stream         Past Surgical History:   Procedure Laterality Date   • COLONOSCOPY N/A approx 2012   • COLONOSCOPY N/A 2/6/2018    Procedure: COLONOSCOPY INTO CEECUM AND TERMINAL MILEUM WITH COLD BIOPSY POLYPECTOMIES & COLD BIOIPSIES;  Surgeon: Usman Roper MD;  Location: [facility] ENDOSCOPY;  Service:    • COLONOSCOPY N/A 12/3/2020    Procedure: COLONOSCOPY TO CECUM AND INTO TI WITH COLD BIOPSIES AND COLD BIOPSY POLYPECTOMY;  Surgeon: Usman Roper MD;  Location: [facility] ENDOSCOPY;  Service: Gastroenterology;  Laterality: N/A;  pre: history of colon polyps  post: diverticulosis, polyp, and internal hemorrhoids   • CYSTOSCOPY TRANSURETHRAL RESECTION OF PROSTATE N/A 7/6/2020    Procedure: TRANSURETHRAL RESECTION OF PROSTATE;  Surgeon: Charanjit Cameron MD;  Location: [facility] MAIN OR;  Service: Urology;  Laterality: N/A;   •  ENDOSCOPY N/A 2018    Procedure: ESOPHAGOGASTRODUODENOSCOPY WITH COLD BIOPSIES;  Surgeon: Usman Roper MD;  Location: [facility] ENDOSCOPY;  Service:    • ENDOSCOPY N/A 6/3/2021    Procedure: ESOPHAGOGASTRODUODENOSCOPY WITH COLD BIOPSIES;  Surgeon: Usman Roper MD;  Location: [facility] ENDOSCOPY;  Service: Gastroenterology;  Laterality: N/A;  PRE:IRON DEFICIENCY ANEMIA  POST: GASTRITIS   • TONSILLECTOMY Bilateral         Family History   Problem Relation Age of Onset   • Stroke Father    • Heart attack Father    • No Known Problems Maternal Grandmother    • No Known Problems Maternal Grandfather    • No Known Problems Paternal Grandmother    • No Known Problems Paternal Grandfather    • Heart disease Brother    • Malig Hyperthermia Neg Hx         Social History     Socioeconomic History   • Marital status:      Spouse name: Not on file   • Number of children: Not on file   • Years of education: Not on file   • Highest education level: Not on file   Tobacco Use   • Smoking status: Former Smoker     Packs/day: 2.00     Years: 14.00     Pack years: 28.00     Types: Cigarettes     Quit date:      Years since quittin.7   • Smokeless tobacco: Never Used   • Tobacco comment: He smoked between the ages of 18 and 32.   Vaping Use   • Vaping Use: Never used   Substance and Sexual Activity   • Alcohol use: Yes     Alcohol/week: 6.0 standard drinks     Types: 6 Standard drinks or equivalent per week   • Drug use: No   • Sexual activity: Defer             Allergies   Allergen Reactions   • Amoxicillin Itching and Swelling   • Levaquin [Levofloxacin] Nausea And Vomiting            Review of Systems   Constitutional: Negative for activity change, appetite change, chills, diaphoresis, fatigue, fever, unexpected weight gain and unexpected weight loss.   HENT: Negative for congestion, dental problem, drooling, ear discharge, ear pain, facial swelling, hearing loss, mouth sores, nosebleeds, postnasal drip,  "rhinorrhea, sinus pressure, sneezing, sore throat, swollen glands, tinnitus, trouble swallowing and voice change.    Eyes: Negative for blurred vision, double vision, photophobia, pain, discharge, redness, itching and visual disturbance.   Respiratory: Negative for apnea, cough, choking, chest tightness, shortness of breath, wheezing and stridor.    Cardiovascular: Negative for chest pain, palpitations and leg swelling.   Gastrointestinal: Positive for GERD. Negative for abdominal distention, abdominal pain, anal bleeding, blood in stool, constipation, diarrhea, nausea, rectal pain, vomiting and indigestion.   Endocrine: Negative for cold intolerance, heat intolerance, polydipsia, polyphagia and polyuria.   Genitourinary: Negative for decreased urine volume, difficulty urinating, discharge, dysuria, flank pain, frequency, genital sores, hematuria, nocturia, erectile dysfunction and urgency.   Musculoskeletal: Negative for arthralgias, back pain, gait problem, joint swelling, myalgias, neck pain, neck stiffness and bursitis.   Skin: Negative for color change, dry skin, pallor, rash, skin lesions and bruise.   Allergic/Immunologic: Negative for environmental allergies, food allergies and immunocompromised state.   Neurological: Negative for dizziness, tremors, seizures, syncope, facial asymmetry, speech difficulty, weakness, light-headedness, numbness, headache, memory problem and confusion.   Hematological: Negative for adenopathy. Does not bruise/bleed easily.   Psychiatric/Behavioral: Negative for agitation, behavioral problems, decreased concentration, dysphoric mood, hallucinations, self-injury, sleep disturbance, suicidal ideas, negative for hyperactivity, depressed mood and stress. The patient is not nervous/anxious.      Review of systems as mentioned in the HPI    Objective   Blood pressure 132/79, pulse 63, temperature 97.2 °F (36.2 °C), temperature source Temporal, resp. rate 16, height 167.6 cm (65.98\"), " weight 105 kg (230 lb 11.2 oz), SpO2 94 %.   Physical Exam   Constitutional: He is oriented to person, place, and time. He appears well-developed and well-nourished. No distress.   HENT:   Head: Normocephalic and atraumatic.   Right Ear: External ear normal.   Left Ear: External ear normal.   Nose: Nose normal.   Mouth/Throat: Oropharynx is clear and moist. No oropharyngeal exudate.   Eyes: Pupils are equal, round, and reactive to light. Conjunctivae are normal. Right eye exhibits no discharge. Left eye exhibits no discharge. No scleral icterus.   Neck: No JVD present. No tracheal deviation present. No thyromegaly present.   Cardiovascular: Normal rate, regular rhythm and normal heart sounds. Exam reveals no gallop and no friction rub.   No murmur heard.  Pulmonary/Chest: Effort normal and breath sounds normal. No stridor. No respiratory distress. He has no wheezes. He has no rales. He exhibits no tenderness.   Abdominal: Soft. Bowel sounds are normal. He exhibits no distension and no mass. There is no abdominal tenderness. There is no rebound and no guarding.   Musculoskeletal: Normal range of motion. No tenderness or deformity.   Lymphadenopathy:     He has no cervical adenopathy.   Neurological: He is alert and oriented to person, place, and time. No cranial nerve deficit. Coordination normal.   Skin: Skin is warm and dry. No rash noted. He is not diaphoretic. No erythema. No pallor.   Psychiatric: His behavior is normal. Judgment and thought content normal.   Vitals reviewed.    I have reexamined the patient and the results are consistent with the previously documented exam. Harika Hein MD     Lab on 10/07/2021   Component Date Value Ref Range Status   • WBC 10/07/2021 6.91  3.40 - 10.80 10*3/mm3 Final   • RBC 10/07/2021 4.74  4.14 - 5.80 10*6/mm3 Final   • Hemoglobin 10/07/2021 14.1  13.0 - 17.7 g/dL Final   • Hematocrit 10/07/2021 43.5  37.5 - 51.0 % Final   • MCV 10/07/2021 91.8  79.0 - 97.0 fL Final   •  MCH 10/07/2021 29.7  26.6 - 33.0 pg Final   • MCHC 10/07/2021 32.4  31.5 - 35.7 g/dL Final   • RDW 10/07/2021 12.5  12.3 - 15.4 % Final   • RDW-SD 10/07/2021 42.1  37.0 - 54.0 fl Final   • MPV 10/07/2021 8.9  6.0 - 12.0 fL Final   • Platelets 10/07/2021 234  140 - 450 10*3/mm3 Final   • Neutrophil % 10/07/2021 65.6  42.7 - 76.0 % Final   • Lymphocyte % 10/07/2021 21.7  19.6 - 45.3 % Final   • Monocyte % 10/07/2021 9.0  5.0 - 12.0 % Final   • Eosinophil % 10/07/2021 2.6  0.3 - 6.2 % Final   • Basophil % 10/07/2021 1.0  0.0 - 1.5 % Final   • Immature Grans % 10/07/2021 0.1  0.0 - 0.5 % Final   • Neutrophils, Absolute 10/07/2021 4.53  1.70 - 7.00 10*3/mm3 Final   • Lymphocytes, Absolute 10/07/2021 1.50  0.70 - 3.10 10*3/mm3 Final   • Monocytes, Absolute 10/07/2021 0.62  0.10 - 0.90 10*3/mm3 Final   • Eosinophils, Absolute 10/07/2021 0.18  0.00 - 0.40 10*3/mm3 Final   • Basophils, Absolute 10/07/2021 0.07  0.00 - 0.20 10*3/mm3 Final   • Immature Grans, Absolute 10/07/2021 0.01  0.00 - 0.05 10*3/mm3 Final   • nRBC 10/07/2021 0.0  0.0 - 0.2 /100 WBC Final        No radiology results for the last 30 days.     4/15/2021 labs reviewed  WBC 7.53, hemoglobin 13.7 which is normal, platelets 246  CMP shows a mildly elevated BUN of 27, creatinine elevated above baseline at 1.21, glucose 101, LFTs normal  Iron profile shows a normal iron of 111, iron saturation 30%, transferrin 252, TIBC 375  Ferritin 32.8      Assessment/Plan      Mr. Holder is a 75-year-old  male referred for further work-up of anemia.  On reviewing the labs he is noted to have low hemoglobin since May 2019.  Ferritin was normal in the past however most recent ferritin from 7/8/2020 noted to be low at 12.  This is indicative of iron deficiency.  He has been on oral iron and has had a good response.    1.anemia  Work-up consistent with iron deficiency   Had a colonoscopy in December 2020 which does not show any evidence of bleeding  EGD June 2021 with  esophagitis and Castellon's esophagus.  He had some hemorrhoidal bleeding with stopped about 3 months ago.  He continues on oral iron every other day.  Hemoglobin today is normal at 14.1.  Ferritin normal at 36.1, iron profile shows an iron saturation of 21% and TIBC 337  Ferritin somewhat on the low side however I think it is reasonable to come off of oral iron and see what happens to his hemoglobin.  If he develops anemia again or iron deficiency then I think it would be prudent to do a small bowel evaluation.  We will plan to recheck CBC and iron studies in 6-month    2.hypertension-blood pressure well controlled, continue current medications, blood pressure 132/79.    3.GI work-up for iron deficiency anemia-C-scope December 2020 with no evidence of bleeding. EGD with Castellon's esophagitis    4.hyperlipidemia , continue atorvastatin, stable    5.mood-continue Zoloft, stable    6.obesity-BMI 37.3.  Today we discussed about dietary modification and regular exercise.  He reports being inactive since COVID-19 pandemic.    7.follow-up-6 months with CBC and iron studies

## 2021-10-18 RX ORDER — AMLODIPINE BESYLATE 10 MG/1
10 TABLET ORAL DAILY
Qty: 90 TABLET | Refills: 1 | Status: SHIPPED | OUTPATIENT
Start: 2021-10-18 | End: 2022-05-14 | Stop reason: SDUPTHER

## 2021-10-18 RX ORDER — HYDROCHLOROTHIAZIDE 12.5 MG/1
12.5 TABLET ORAL DAILY
Qty: 90 TABLET | Refills: 1 | Status: SHIPPED | OUTPATIENT
Start: 2021-10-18 | End: 2022-05-14 | Stop reason: SDUPTHER

## 2021-10-18 RX ORDER — LEVOTHYROXINE SODIUM 0.07 MG/1
75 TABLET ORAL DAILY
Qty: 90 TABLET | Refills: 1 | Status: SHIPPED | OUTPATIENT
Start: 2021-10-18 | End: 2022-05-14 | Stop reason: SDUPTHER

## 2021-10-18 NOTE — TELEPHONE ENCOUNTER
Rx Refill Note  Requested Prescriptions     Pending Prescriptions Disp Refills   • sertraline (ZOLOFT) 50 MG tablet 90 tablet 1     Sig: Take 1 tablet by mouth Daily.   • amLODIPine (NORVASC) 10 MG tablet 90 tablet 1     Sig: Take 1 tablet by mouth Daily.   • hydroCHLOROthiazide (HYDRODIURIL) 12.5 MG tablet 90 tablet 1     Sig: Take 1 tablet by mouth Daily.   • levothyroxine (SYNTHROID, LEVOTHROID) 75 MCG tablet 90 tablet 1     Sig: Take 1 tablet by mouth Daily.      Last office visit with prescribing clinician: 6/2/2021      Next office visit with prescribing clinician: 12/7/2021            Saray Alonso MA  10/18/21, 07:46 EDT

## 2021-11-08 RX ORDER — CARVEDILOL 6.25 MG/1
6.25 TABLET ORAL 2 TIMES DAILY WITH MEALS
Qty: 180 TABLET | Refills: 1 | Status: SHIPPED | OUTPATIENT
Start: 2021-11-08 | End: 2022-05-14 | Stop reason: SDUPTHER

## 2021-11-08 RX ORDER — HYDROCHLOROTHIAZIDE 12.5 MG/1
12.5 TABLET ORAL DAILY
Qty: 90 TABLET | Refills: 1 | OUTPATIENT
Start: 2021-11-08

## 2021-11-08 RX ORDER — LEVOTHYROXINE SODIUM 0.07 MG/1
75 TABLET ORAL DAILY
Qty: 90 TABLET | Refills: 1 | OUTPATIENT
Start: 2021-11-08

## 2021-11-08 RX ORDER — AMLODIPINE BESYLATE 10 MG/1
10 TABLET ORAL DAILY
Qty: 90 TABLET | Refills: 1 | OUTPATIENT
Start: 2021-11-08

## 2021-11-08 NOTE — TELEPHONE ENCOUNTER
Rx Refill Note  Requested Prescriptions     Pending Prescriptions Disp Refills   • amLODIPine (NORVASC) 10 MG tablet 90 tablet 1     Sig: Take 1 tablet by mouth Daily.   • hydroCHLOROthiazide (HYDRODIURIL) 12.5 MG tablet 90 tablet 1     Sig: Take 1 tablet by mouth Daily.   • levothyroxine (SYNTHROID, LEVOTHROID) 75 MCG tablet 90 tablet 1     Sig: Take 1 tablet by mouth Daily.      Last office visit with prescribing clinician: Visit date not found      Next office visit with prescribing clinician: Visit date not found            Saray Alonso MA  11/08/21, 10:59 EST

## 2021-11-08 NOTE — TELEPHONE ENCOUNTER
Rx Refill Note  Requested Prescriptions     Pending Prescriptions Disp Refills   • carvedilol (COREG) 6.25 MG tablet 180 tablet 1     Sig: Take 1 tablet by mouth 2 (Two) Times a Day With Meals.      Last office visit with prescribing clinician: 6/2/2021      Next office visit with prescribing clinician: 12/7/2021            Priscilla Hou MA  11/08/21, 09:42 EST

## 2021-11-09 ENCOUNTER — TELEPHONE (OUTPATIENT)
Dept: FAMILY MEDICINE CLINIC | Facility: CLINIC | Age: 76
End: 2021-11-09

## 2021-11-09 NOTE — TELEPHONE ENCOUNTER
Pending Prescriptions Disp Refills   • amLODIPine (NORVASC) 10 MG tablet 90 tablet 1       Sig: Take 1 tablet by mouth Daily.   • hydroCHLOROthiazide (HYDRODIURIL) 12.5 MG tablet 90 tablet 1       Sig: Take 1 tablet by mouth Daily.   • levothyroxine (SYNTHROID, LEVOTHROID) 75 MCG tablet 90 tablet 1       Sig: Take 1 tablet by mouth Daily.     PT CALLED IN AND STATED THAT HE REQUESTED THESE MEDS, DOES NOT UNDERSTAND WHY IT WENT TO DR. CISNEROS. PER PT RAMAN ON [address] [facility],PT DOES NOT HAVE THESE MEDS

## 2021-11-15 ENCOUNTER — OFFICE VISIT (OUTPATIENT)
Dept: GASTROENTEROLOGY | Facility: CLINIC | Age: 76
End: 2021-11-15

## 2021-11-15 VITALS
OXYGEN SATURATION: 93 % | SYSTOLIC BLOOD PRESSURE: 157 MMHG | BODY MASS INDEX: 37.09 KG/M2 | HEIGHT: 66 IN | HEART RATE: 60 BPM | TEMPERATURE: 97 F | WEIGHT: 230.8 LBS | DIASTOLIC BLOOD PRESSURE: 79 MMHG

## 2021-11-15 DIAGNOSIS — K63.5 POLYP OF COLON, UNSPECIFIED PART OF COLON, UNSPECIFIED TYPE: ICD-10-CM

## 2021-11-15 DIAGNOSIS — K22.719 BARRETT'S ESOPHAGUS WITH DYSPLASIA: ICD-10-CM

## 2021-11-15 DIAGNOSIS — K21.9 GASTROESOPHAGEAL REFLUX DISEASE WITHOUT ESOPHAGITIS: Primary | ICD-10-CM

## 2021-11-15 DIAGNOSIS — D50.0 IRON DEFICIENCY ANEMIA DUE TO CHRONIC BLOOD LOSS: ICD-10-CM

## 2021-11-15 PROCEDURE — 99214 OFFICE O/P EST MOD 30 MIN: CPT | Performed by: INTERNAL MEDICINE

## 2021-11-15 RX ORDER — LANSOPRAZOLE 30 MG/1
30 CAPSULE, DELAYED RELEASE ORAL DAILY
Qty: 90 CAPSULE | Refills: 3 | Status: SHIPPED | OUTPATIENT
Start: 2021-11-15 | End: 2023-01-03

## 2021-11-15 NOTE — PROGRESS NOTES
Chief Complaint   Patient presents with   • Follow-up       History of Present Illness:   76 y.o. male         He does have a history of colon polyps and GERD with Castellon's without dysplasia.  His last EGD was on 6/3/2021.  His last colonoscopy was on 12/2020.       No heartburn while on Prevacid 30 mg/day. NO dysphagia. NO nausea or vomiting. NO fevers, chills. NO dypshagia. He rarely has rectal bleeding and she saw Dr. Bean who thinks that he has hemorrhoids.  No melena. No abdominal or chest pain. He stopped the iron pills. He is to have labs drawn this week.     Past Medical History:   Diagnosis Date   • Anxiety    • Asthma     mild case, allergies   • Bronchitis    • Colon polyps    • Diverticulitis    • Enlarged prostate    • GERD (gastroesophageal reflux disease)    • H/O bladder infections    • H/O bronchitis    • High cholesterol    • History of anemia    • Hypertension    • Hypothyroidism    • Seasonal allergies    • Sleep apnea     CPAP   • Slow urinary stream        Past Surgical History:   Procedure Laterality Date   • COLONOSCOPY N/A approx 2012   • COLONOSCOPY N/A 2/6/2018    Procedure: COLONOSCOPY INTO CEECUM AND TERMINAL MILEUM WITH COLD BIOPSY POLYPECTOMIES & COLD BIOIPSIES;  Surgeon: Usman Roper MD;  Location: [facility] ENDOSCOPY;  Service:    • COLONOSCOPY N/A 12/3/2020    Procedure: COLONOSCOPY TO CECUM AND INTO TI WITH COLD BIOPSIES AND COLD BIOPSY POLYPECTOMY;  Surgeon: Usman Roper MD;  Location: [facility] ENDOSCOPY;  Service: Gastroenterology;  Laterality: N/A;  pre: history of colon polyps  post: diverticulosis, polyp, and internal hemorrhoids   • CYSTOSCOPY TRANSURETHRAL RESECTION OF PROSTATE N/A 7/6/2020    Procedure: TRANSURETHRAL RESECTION OF PROSTATE;  Surgeon: Charanjit Cameron MD;  Location: [facility] MAIN OR;  Service: Urology;  Laterality: N/A;   • ENDOSCOPY N/A 2/6/2018    Procedure: ESOPHAGOGASTRODUODENOSCOPY WITH COLD BIOPSIES;  Surgeon: Usman Roper MD;  Location: [facility]  ENDOSCOPY;  Service:    • ENDOSCOPY N/A 6/3/2021    Procedure: ESOPHAGOGASTRODUODENOSCOPY WITH COLD BIOPSIES;  Surgeon: Usman Roper MD;  Location: [facility] ENDOSCOPY;  Service: Gastroenterology;  Laterality: N/A;  PRE:IRON DEFICIENCY ANEMIA  POST: GASTRITIS   • TONSILLECTOMY Bilateral 1954         Current Outpatient Medications:   •  acetaminophen (TYLENOL) 325 MG tablet, Take 650 mg by mouth Every 6 (Six) Hours As Needed., Disp: , Rfl:   •  albuterol sulfate HFA (Ventolin HFA) 108 (90 Base) MCG/ACT inhaler, 2 puffs Q4H for cough and wheeze, Disp: 18 g, Rfl: 1  •  amLODIPine (NORVASC) 10 MG tablet, Take 1 tablet by mouth Daily., Disp: 90 tablet, Rfl: 1  •  atorvastatin (LIPITOR) 40 MG tablet, Take 1 tablet by mouth Daily., Disp: 90 tablet, Rfl: 1  •  carvedilol (COREG) 6.25 MG tablet, Take 1 tablet by mouth 2 (Two) Times a Day With Meals., Disp: 180 tablet, Rfl: 1  •  hydroCHLOROthiazide (HYDRODIURIL) 12.5 MG tablet, Take 1 tablet by mouth Daily., Disp: 90 tablet, Rfl: 1  •  lansoprazole (PREVACID) 30 MG capsule, Take 1 capsule by mouth Daily., Disp: 90 capsule, Rfl: 3  •  levothyroxine (SYNTHROID, LEVOTHROID) 75 MCG tablet, Take 1 tablet by mouth Daily., Disp: 90 tablet, Rfl: 1  •  lidocaine (XYLOCAINE) 2 % jelly, Apply to affected area daily prn, Disp: 1 each, Rfl: 3  •  lisinopril (PRINIVIL,ZESTRIL) 40 MG tablet, Take 1 tablet by mouth Daily., Disp: 90 tablet, Rfl: 1  •  sertraline (ZOLOFT) 50 MG tablet, Take 1 tablet by mouth Daily., Disp: 90 tablet, Rfl: 1    Allergies   Allergen Reactions   • Amoxicillin Itching and Swelling   • Levaquin [Levofloxacin] Nausea And Vomiting       Family History   Problem Relation Age of Onset   • Stroke Father    • Heart attack Father    • No Known Problems Maternal Grandmother    • No Known Problems Maternal Grandfather    • No Known Problems Paternal Grandmother    • No Known Problems Paternal Grandfather    • Heart disease Brother    • Malig Hyperthermia Neg Hx    • Colon  cancer Neg Hx    • Colon polyps Neg Hx        Social History     Socioeconomic History   • Marital status:    Tobacco Use   • Smoking status: Former Smoker     Packs/day: 2.00     Years: 14.00     Pack years: 28.00     Types: Cigarettes     Quit date:      Years since quittin.9   • Smokeless tobacco: Never Used   • Tobacco comment: He smoked between the ages of 18 and 32.   Vaping Use   • Vaping Use: Never used   Substance and Sexual Activity   • Alcohol use: Yes     Alcohol/week: 6.0 standard drinks     Types: 6 Standard drinks or equivalent per week     Comment: occ.   • Drug use: No   • Sexual activity: Defer       Review of Systems   Gastrointestinal: Negative for abdominal pain.   All other systems reviewed and are negative.    Pertinent positives and negatives documented in the HPI and all other systems reviewed and were found to be negative.  Vitals:    11/15/21 1048   BP: 157/79   Pulse: 60   Temp: 97 °F (36.1 °C)   SpO2: 93%       Physical Exam  Vitals reviewed.   Constitutional:       General: He is not in acute distress.     Appearance: Normal appearance. He is well-developed. He is obese. He is not diaphoretic.   HENT:      Head: Normocephalic and atraumatic. Hair is normal.      Right Ear: Hearing, tympanic membrane, ear canal and external ear normal.      Left Ear: Hearing, tympanic membrane, ear canal and external ear normal.      Nose: Nose normal. No nasal deformity.      Mouth/Throat:      Mouth: Mucous membranes are moist. No oral lesions.      Pharynx: Uvula midline. No uvula swelling.   Eyes:      General: Lids are normal. No scleral icterus.        Right eye: No discharge.         Left eye: No discharge.      Extraocular Movements: Extraocular movements intact.      Right eye: Normal extraocular motion and no nystagmus.      Left eye: Normal extraocular motion and no nystagmus.      Conjunctiva/sclera: Conjunctivae normal.      Pupils: Pupils are equal, round, and reactive to  light.   Neck:      Thyroid: No thyromegaly.      Vascular: No JVD.   Cardiovascular:      Rate and Rhythm: Normal rate and regular rhythm.      Pulses: Normal pulses.      Heart sounds: Normal heart sounds. No murmur heard.  No gallop.    Pulmonary:      Effort: Pulmonary effort is normal. No respiratory distress.      Breath sounds: Normal breath sounds. No wheezing or rales.   Chest:      Chest wall: No tenderness.   Abdominal:      General: Bowel sounds are normal. There is no distension.      Palpations: Abdomen is soft. There is no mass.      Tenderness: There is no abdominal tenderness. There is no guarding.      Hernia: No hernia is present.   Genitourinary:     Rectum: Normal. Guaiac result negative.   Musculoskeletal:         General: No tenderness or deformity. Normal range of motion.      Cervical back: Normal range of motion and neck supple.   Lymphadenopathy:      Cervical: No cervical adenopathy.   Skin:     General: Skin is warm and dry.      Findings: No rash.   Neurological:      Mental Status: He is alert and oriented to person, place, and time.      Cranial Nerves: No cranial nerve deficit.      Motor: No abnormal muscle tone.      Coordination: Coordination normal.      Deep Tendon Reflexes: Reflexes are normal and symmetric. Reflexes normal.   Psychiatric:         Mood and Affect: Mood normal.         Behavior: Behavior normal.         Thought Content: Thought content normal.         Judgment: Judgment normal.         Diagnoses and all orders for this visit:    1. Gastroesophageal reflux disease without esophagitis (Primary)    2. Polyp of colon, unspecified part of colon, unspecified type    3. Castellon's esophagus with dysplasia    4. Iron deficiency anemia due to chronic blood loss    Other orders  -     lansoprazole (PREVACID) 30 MG capsule; Take 1 capsule by mouth Daily.  Dispense: 90 capsule; Refill: 3      Assessment:  1. H/o colon polyps. His last colonoscopy was in 12/20. I recommended  a colonoscopy in 3 yrs.   2. GERD  3. Castellon's esophagus. Last EGD in 6/21. I recommended repeat EGD in 3-5 yrs.   4. H/o iron def anemia. He is heme negative today.  5.     Recommendations:  1. Continue Prevacid 30 mg/day.  2. To f/u with Hematologist  3. F/u 6 mos. If he continues to be iron def then maybe we do need to evaluate his small bowel?    No follow-ups on file.    Usman Roper MD  11/15/2021

## 2021-11-29 RX ORDER — ATORVASTATIN CALCIUM 40 MG/1
40 TABLET, FILM COATED ORAL DAILY
Qty: 90 TABLET | Refills: 1 | Status: SHIPPED | OUTPATIENT
Start: 2021-11-29 | End: 2022-05-28 | Stop reason: SDUPTHER

## 2021-12-01 ENCOUNTER — OFFICE VISIT (OUTPATIENT)
Dept: SLEEP MEDICINE | Facility: HOSPITAL | Age: 76
End: 2021-12-01

## 2021-12-01 VITALS — OXYGEN SATURATION: 96 % | WEIGHT: 231 LBS | BODY MASS INDEX: 37.12 KG/M2 | HEIGHT: 66 IN | HEART RATE: 63 BPM

## 2021-12-01 DIAGNOSIS — G47.33 OSA ON CPAP: Primary | ICD-10-CM

## 2021-12-01 DIAGNOSIS — Z99.89 OSA ON CPAP: Primary | ICD-10-CM

## 2021-12-01 DIAGNOSIS — G47.36 HYPOXEMIA ASSOCIATED WITH SLEEP: ICD-10-CM

## 2021-12-01 PROCEDURE — G0463 HOSPITAL OUTPT CLINIC VISIT: HCPCS

## 2021-12-01 PROCEDURE — 99213 OFFICE O/P EST LOW 20 MIN: CPT | Performed by: INTERNAL MEDICINE

## 2021-12-01 NOTE — PROGRESS NOTES
"Follow Up [facility] Disorders [facility] Note     Chief Complaint:  ROMINA     Primary Care Physician: Mynor Russ MD    Interval History:   The patient is a 76 y.o. male  who I last saw 2020 and that note was reviewed. The patient reports he is stable without new complaints. He goes to bed at 11 PM and awakens at 7:30 AM. He awakens to change position.    Self-administered Turrell Sleepiness Scale test results: 6  0-5 Lower normal daytime sleepiness  6-10 Higher normal daytime sleepiness  11-12 Mild, 13-15 Moderate, & 16-24 Severe excessive daytime sleepiness    Review of Systems:    A complete review of systems was done and all were negative with the exception of the above    Social History:    Social History     Socioeconomic History   • Marital status:    Tobacco Use   • Smoking status: Former Smoker     Packs/day: 2.00     Years: 14.00     Pack years: 28.00     Types: Cigarettes     Quit date:      Years since quittin.9   • Smokeless tobacco: Never Used   • Tobacco comment: He smoked between the ages of 18 and 32.   Vaping Use   • Vaping Use: Never used   Substance and Sexual Activity   • Alcohol use: Yes     Alcohol/week: 6.0 standard drinks     Types: 6 Standard drinks or equivalent per week     Comment: occ.   • Drug use: No   • Sexual activity: Defer       Allergies:  Amoxicillin and Levaquin [levofloxacin]     Medication Review: His list was reviewed.      Vital Signs:    Vitals:    21 0957   Pulse: 63   SpO2: 96%   Weight: 105 kg (231 lb)   Height: 167.6 cm (65.98\")     Body mass index is 37.3 kg/m².    Physical Exam:    Constitutional:  Well developed 76 y.o. male that appears in no apparent distress.  Awake & oriented times 3.  Normal mood with normal recent and remote memory and normal judgement.  Eyes:  Conjunctivae normal.  Oropharynx: Previously, moist mucous membranes without exudate and a large tongue and normal uvula and narrowed posterior pharyngeal opening and class II-3 " Mallampati airway, patient is wearing a facemask.     Downloaded PAP Data Reviewed For Compliance:  DME is [facility] and he uses a fullface mask.  Downloads between 8/31 and 11/28/2021 compliance 97%.  Average usage is 8 hours and 11 minutes.  Average AHI is normal without a significant leak.  Average auto CPAP pressure is 9.8 and his auto CPAP is 8-20.    I have reviewed the above results and compared them with the patient's last downloads and reviewed with the patient.    Impression:   Severe obstructive sleep apnea with sleep-related hypoxia by home sleep study 4/7/2018 adequately treated with auto CPAP. The patient appears to be at goal with good compliance and usage. The patient has no complaints of hypersomnolence.    Plan:  Good sleep hygiene measures should be maintained.  Weight loss would be beneficial in this patient who is obese by BMI.      After evaluating the patient and assessing results available, the patient is benefiting from the treatment being provided.     The patient will continue auto CPAP.  After clinical evaluation and review of downloads, I recommend no changes to the patient's pressures. The patient's main complaint is that he does not get Velcro straps every 6 months for his headgear. A new prescription will be sent to the patient's DME.    Lloyd recall discussed. The patient has registered his device and he does not use an ozone     I answered all of the patient's questions.  The patient will call for any problems and will follow up in 1 year.      Kendrick Diaz MD  Sleep Medicine  12/01/21  10:16 EST

## 2021-12-05 PROBLEM — G47.36 HYPOXEMIA ASSOCIATED WITH SLEEP: Status: ACTIVE | Noted: 2018-07-28

## 2021-12-07 ENCOUNTER — OFFICE VISIT (OUTPATIENT)
Dept: FAMILY MEDICINE CLINIC | Facility: CLINIC | Age: 76
End: 2021-12-07

## 2021-12-07 VITALS
SYSTOLIC BLOOD PRESSURE: 134 MMHG | HEIGHT: 66 IN | WEIGHT: 232.9 LBS | DIASTOLIC BLOOD PRESSURE: 75 MMHG | BODY MASS INDEX: 37.43 KG/M2 | OXYGEN SATURATION: 96 % | TEMPERATURE: 97.8 F | HEART RATE: 59 BPM

## 2021-12-07 DIAGNOSIS — Z00.00 MEDICARE ANNUAL WELLNESS VISIT, SUBSEQUENT: Primary | ICD-10-CM

## 2021-12-07 DIAGNOSIS — F33.42 RECURRENT MAJOR DEPRESSIVE DISORDER, IN FULL REMISSION (HCC): Chronic | ICD-10-CM

## 2021-12-07 DIAGNOSIS — E03.8 SUBCLINICAL HYPOTHYROIDISM: Chronic | ICD-10-CM

## 2021-12-07 DIAGNOSIS — D50.9 IRON DEFICIENCY ANEMIA, UNSPECIFIED IRON DEFICIENCY ANEMIA TYPE: ICD-10-CM

## 2021-12-07 DIAGNOSIS — E78.01 FAMILIAL HYPERCHOLESTEROLEMIA: Chronic | ICD-10-CM

## 2021-12-07 DIAGNOSIS — I10 ESSENTIAL HYPERTENSION: Chronic | ICD-10-CM

## 2021-12-07 PROCEDURE — G0439 PPPS, SUBSEQ VISIT: HCPCS | Performed by: FAMILY MEDICINE

## 2021-12-07 PROCEDURE — 1160F RVW MEDS BY RX/DR IN RCRD: CPT | Performed by: FAMILY MEDICINE

## 2021-12-07 PROCEDURE — 99214 OFFICE O/P EST MOD 30 MIN: CPT | Performed by: FAMILY MEDICINE

## 2021-12-07 PROCEDURE — 1170F FXNL STATUS ASSESSED: CPT | Performed by: FAMILY MEDICINE

## 2021-12-07 NOTE — PROGRESS NOTES
"Chief Complaint  Medicare Wellness-subsequent    Subjective          Dick Galindo presents to [facility]  History of Present Illness     Follow-up hypertension.  Patient continues amlodipine, carvedilol, hydrochlorothiazide and lisinopril.  We had the hydrochlorothiazide number of months ago and his blood pressures been improved.  States very acceptable at 134/75.  No cardiovascular symptoms.  He continues to exercise on a regular basis without trouble.    Hyperlipidemia.  He continues atorvastatin 40 mg a day.  He does not need to take low-dose aspirin daily.  His lipid panel is overall stable.  Normal LFTs.    Subclinical hypothyroidism.  We increased his levothyroxine up to 75 mcg a day at last visit.  His TSH is now therapeutic.    Major depression.  In remission.  He continues on sertraline 50 mg daily.  No trouble with it.  He wants to continue taking it.  He also used to have panic attacks which are now much better.    Iron deficiency anemia.  Followed by GI and hematology.    Objective   Vital Signs:   /75   Pulse 59   Temp 97.8 °F (36.6 °C) (Temporal)   Ht 167.6 cm (65.98\")   Wt 106 kg (232 lb 14.4 oz)   SpO2 96%   BMI 37.61 kg/m²     Physical Exam  Vitals and nursing note reviewed.   Constitutional:       General: He is not in acute distress.     Appearance: Normal appearance. He is well-developed. He is obese.   HENT:      Head: Atraumatic.   Cardiovascular:      Rate and Rhythm: Normal rate and regular rhythm.      Heart sounds: Normal heart sounds.   Pulmonary:      Effort: Pulmonary effort is normal.      Breath sounds: Normal breath sounds.   Abdominal:      General: There is no distension.      Palpations: There is no mass.      Tenderness: There is no abdominal tenderness.   Skin:     General: Skin is warm and dry.   Neurological:      Mental Status: He is alert and oriented to person, place, and time.   Psychiatric:         Mood and Affect: Mood normal. "         Behavior: Behavior normal.         Thought Content: Thought content normal.         Judgment: Judgment normal.        Result Review :   The following data was reviewed by: Mynor Russ MD on 12/07/2021:  Common labs    Common Labsle 7/9/21 10/7/21 10/7/21 11/17/21 11/17/21 11/17/21     1259 1259 0936 0936 0936   Glucose   106 (A)  104 (A)    BUN   22  23    Creatinine   1.32 (A)  1.17    eGFR Non  Am   53 (A)  61    eGFR  Am     71    Sodium   142  141    Potassium   4.4  4.8    Chloride   103  101    Calcium   9.4  9.6    Total Protein     7.0    Albumin   4.20  4.3    Total Bilirubin   0.5  0.6    Alkaline Phosphatase   77  83    AST (SGOT)   21  29    ALT (SGPT)   19  29    WBC 6.80 6.91  6.7     Hemoglobin 15.5 14.1  14.0     Hematocrit 45.8 43.5  43.5     Platelets 244 234  256     Total Cholesterol      158   Triglycerides      99   HDL Cholesterol      53   LDL Cholesterol       87   (A) Abnormal value       Comments are available for some flowsheets but are not being displayed.                     Assessment and Plan    Diagnoses and all orders for this visit:    1. Medicare annual wellness visit, subsequent (Primary)    2. Essential hypertension    3. Familial hypercholesterolemia  -     Comprehensive Metabolic Panel; Future  -     Lipid Panel; Future    4. Subclinical hypothyroidism  -     TSH Rfx On Abnormal To Free T4; Future    5. Recurrent major depressive disorder, in full remission (HCC)    6. Iron deficiency anemia, unspecified iron deficiency anemia type  -     CBC & Differential; Future      Hypertension very well controlled.  Follow-up in 6 months for recheck with lab work prior.      Hyperlipidemia.  Continue atorvastatin.    Subclinical hypothyroidism.  Doing well since increasing the levothyroxine to 75 mcg a day.  TSH is therapeutic.  Check in 6 months.    Major depression.  Full remission.  Continue sertraline.    Iron deficiency anemia.  Followed by GI and  hematology.  Levels have been normal.  He is now off the iron supplementation.          Follow Up   No follow-ups on file.  Patient was given instructions and counseling regarding his condition or for health maintenance advice. Please see specific information pulled into the AVS if appropriate.

## 2021-12-07 NOTE — PROGRESS NOTES
The ABCs of the Annual Wellness Visit  Subsequent Medicare Wellness Visit    Chief Complaint   Patient presents with   • Medicare Wellness-subsequent      Subjective    History of Present Illness:  Dick Galindo is a 76 y.o. male who presents for a Subsequent Medicare Wellness Visit.    The following portions of the patient's history were reviewed and   updated as appropriate: allergies, current medications, past family history, past medical history, past social history, past surgical history and problem list.    Compared to one year ago, the patient feels his physical   health is the same.    Compared to one year ago, the patient feels his mental   health is the same.    Recent Hospitalizations:  He was not admitted to the hospital during the last year.       Current Medical Providers:  Patient Care Team:  Mynor Russ MD as PCP - General (Family Medicine)  Mynor Russ MD as Referring Physician (Family Medicine)  Kajal Bean MD as Surgeon (General Surgery)  Harika Hein MD as Consulting Physician (Hematology and Oncology)    Outpatient Medications Prior to Visit   Medication Sig Dispense Refill   • acetaminophen (TYLENOL) 325 MG tablet Take 650 mg by mouth Every 6 (Six) Hours As Needed.     • albuterol sulfate HFA (Ventolin HFA) 108 (90 Base) MCG/ACT inhaler 2 puffs Q4H for cough and wheeze 18 g 1   • amLODIPine (NORVASC) 10 MG tablet Take 1 tablet by mouth Daily. 90 tablet 1   • atorvastatin (LIPITOR) 40 MG tablet Take 1 tablet by mouth Daily. 90 tablet 1   • carvedilol (COREG) 6.25 MG tablet Take 1 tablet by mouth 2 (Two) Times a Day With Meals. 180 tablet 1   • hydroCHLOROthiazide (HYDRODIURIL) 12.5 MG tablet Take 1 tablet by mouth Daily. 90 tablet 1   • lansoprazole (PREVACID) 30 MG capsule Take 1 capsule by mouth Daily. 90 capsule 3   • levothyroxine (SYNTHROID, LEVOTHROID) 75 MCG tablet Take 1 tablet by mouth Daily. 90 tablet 1   • lidocaine (XYLOCAINE) 2 % jelly Apply to affected area  "daily prn 1 each 3   • lisinopril (PRINIVIL,ZESTRIL) 40 MG tablet Take 1 tablet by mouth Daily. 90 tablet 1   • sertraline (ZOLOFT) 50 MG tablet Take 1 tablet by mouth Daily. 90 tablet 1     No facility-administered medications prior to visit.       No opioid medication identified on active medication list. I have reviewed chart for other potential  high risk medication/s and harmful drug interactions in the elderly.          Aspirin is not on active medication list.  Aspirin use is not indicated based on review of current medical condition/s. Risk of harm outweighs potential benefits.  .    Patient Active Problem List   Diagnosis   • Spinal stenosis of lumbar region   • Essential hypertension   • Lower urinary tract symptoms (LUTS)   • Familial hypercholesterolemia   • Recurrent major depressive disorder, in full remission (HCC)   • Hyperplastic colon polyp   • Gastroesophageal reflux disease without esophagitis   • Subclinical hypothyroidism   • Severe ROMINA on auto CPAP   • Hypoxemia associated with sleep   • Bilateral renal cysts   • Class 2 severe obesity due to excess calories with serious comorbidity and body mass index (BMI) of 38.0 to 38.9 in adult (HCC)   • Hypersomnia due to medical condition   • BPH with obstruction/lower urinary tract symptoms   • Colon polyp   • Iron deficiency anemia   • History of acute gastritis   • Castellon's esophagus with dysplasia     Advance Care Planning  Advance Directive is on file.  ACP discussion was held with the patient during this visit. Patient has an advance directive in EMR which is still valid.           Objective    Vitals:    12/07/21 0954   BP: 134/75   Pulse: 59   Temp: 97.8 °F (36.6 °C)   TempSrc: Temporal   SpO2: 96%   Weight: 106 kg (232 lb 14.4 oz)   Height: 167.6 cm (65.98\")     BMI Readings from Last 1 Encounters:   12/07/21 37.61 kg/m²   BMI is above normal parameters. Recommendations include: exercise counseling    Does the patient have evidence of " cognitive impairment? No    Physical Exam  Lab Results   Component Value Date    CHLPL 158 2021    TRIG 99 2021    HDL 53 2021    LDL 87 2021    VLDL 18 2021            HEALTH RISK ASSESSMENT    Smoking Status:  Social History     Tobacco Use   Smoking Status Former Smoker   • Packs/day: 2.00   • Years: 14.00   • Pack years: 28.00   • Types: Cigarettes   • Quit date:    • Years since quittin.9   Smokeless Tobacco Never Used   Tobacco Comment    He smoked between the ages of 18 and 32.     Alcohol Consumption:  Social History     Substance and Sexual Activity   Alcohol Use Yes   • Alcohol/week: 6.0 standard drinks   • Types: 6 Standard drinks or equivalent per week    Comment: occ.     Fall Risk Screen:    STEADI Fall Risk Assessment was completed, and patient is at LOW risk for falls.Assessment completed on:2021    Depression Screening:  PHQ-2/PHQ-9 Depression Screening 2021   Little interest or pleasure in doing things 0   Feeling down, depressed, or hopeless 0   Trouble falling or staying asleep, or sleeping too much -   Feeling tired or having little energy -   Poor appetite or overeating -   Feeling bad about yourself - or that you are a failure or have let yourself or your family down -   Trouble concentrating on things, such as reading the newspaper or watching television -   Moving or speaking so slowly that other people could have noticed. Or the opposite - being so fidgety or restless that you have been moving around a lot more than usual -   Thoughts that you would be better off dead, or of hurting yourself in some way -   Total Score 0   If you checked off any problems, how difficult have these problems made it for you to do your work, take care of things at home, or get along with other people? -       Health Habits and Functional and Cognitive Screening:  Functional & Cognitive Status 2021   Do you have difficulty preparing food and eating? No   Do  you have difficulty bathing yourself, getting dressed or grooming yourself? No   Do you have difficulty using the toilet? No   Do you have difficulty moving around from place to place? No   Do you have trouble with steps or getting out of a bed or a chair? No   Current Diet Well Balanced Diet   Dental Exam Up to date   Eye Exam Up to date   Exercise (times per week) 2 times per week   Current Exercises Include Walking   Current Exercise Activities Include -   Do you need help using the phone?  No   Are you deaf or do you have serious difficulty hearing?  No   Do you need help with transportation? No   Do you need help shopping? No   Do you need help preparing meals?  No   Do you need help with housework?  No   Do you need help with laundry? No   Do you need help taking your medications? No   Do you need help managing money? No   Do you ever drive or ride in a car without wearing a seat belt? No   Have you felt unusual stress, anger or loneliness in the last month? No   Who do you live with? Spouse   If you need help, do you have trouble finding someone available to you? No   Have you been bothered in the last four weeks by sexual problems? No   Do you have difficulty concentrating, remembering or making decisions? No       Age-appropriate Screening Schedule:  Refer to the list below for future screening recommendations based on patient's age, sex and/or medical conditions. Orders for these recommended tests are listed in the plan section. The patient has been provided with a written plan.    Health Maintenance   Topic Date Due   • TDAP/TD VACCINES (1 - Tdap) Never done   • ZOSTER VACCINE (1 of 2) Never done   • LIPID PANEL  11/17/2022   • INFLUENZA VACCINE  Completed              Assessment/Plan   CMS Preventative Services Quick Reference  Risk Factors Identified During Encounter  Depression/Dysphoria  Immunizations Discussed/Encouraged (specific Immunizations; Tdap, Shingrix and COVID19  Obesity/Overweight   The  above risks/problems have been discussed with the patient.  Follow up actions/plans if indicated are seen below in the Assessment/Plan Section.  Pertinent information has been shared with the patient in the After Visit Summary.    Diagnoses and all orders for this visit:    1. Medicare annual wellness visit, subsequent (Primary)    2. Essential hypertension    3. Familial hypercholesterolemia    4. Subclinical hypothyroidism    5. Recurrent major depressive disorder, in full remission (HCC)        Follow Up:   No follow-ups on file.     An After Visit Summary and PPPS were made available to the patient.

## 2021-12-12 RX ORDER — LISINOPRIL 40 MG/1
40 TABLET ORAL DAILY
Qty: 90 TABLET | Refills: 1 | Status: SHIPPED | OUTPATIENT
Start: 2021-12-12 | End: 2022-05-28 | Stop reason: SDUPTHER

## 2022-01-19 ENCOUNTER — OFFICE VISIT (OUTPATIENT)
Dept: FAMILY MEDICINE CLINIC | Facility: CLINIC | Age: 77
End: 2022-01-19

## 2022-01-19 VITALS
TEMPERATURE: 97.1 F | OXYGEN SATURATION: 95 % | HEART RATE: 57 BPM | SYSTOLIC BLOOD PRESSURE: 135 MMHG | BODY MASS INDEX: 37.61 KG/M2 | HEIGHT: 66 IN | DIASTOLIC BLOOD PRESSURE: 72 MMHG

## 2022-01-19 DIAGNOSIS — R19.7 DIARRHEA, UNSPECIFIED TYPE: Primary | ICD-10-CM

## 2022-01-19 PROCEDURE — 99213 OFFICE O/P EST LOW 20 MIN: CPT | Performed by: FAMILY MEDICINE

## 2022-01-19 NOTE — PROGRESS NOTES
"Chief Complaint  gi issues    Subjective          Dick Galindo presents to [facility]  History of Present Illness     Around Fitz he started having some semifrequent loose watery stools with some cramping.  Then got better.  Then got worse.  He possibly was eating more rich food over this period of time.  He had no fevers or chills.  No blood in the stool.  His last colonoscopy was in 2020 with a few polyps.  Repeat next year.  Thinks this may be IBS-like symptoms.  He had no vomiting or diarrhea.  No mucus in the stool.  No pus in the stool.  No change in stool character other than the loose watery stool.  He then contracted COVID-19 about 10 to 12 days ago.  Mild cold symptoms with sore throat and sinus symptoms.  Those got better after a few days.  He is now 10 days plus out.  He took some Imodium about 5 days ago.  Has had no diarrhea since.  He stopped his fiber supplementation few days ago.  He continues a probiotic.  He otherwise feels fine.  He has been taking sertraline for a number of months if not years.  He has been taking a PPI for a number of months also, history of Castellon's.    Objective   Vital Signs:   /72   Pulse 57   Temp 97.1 °F (36.2 °C) (Temporal)   Ht 167.6 cm (65.98\")   SpO2 95%   BMI 37.61 kg/m²     Physical Exam  Vitals and nursing note reviewed.   Constitutional:       General: He is not in acute distress.     Appearance: He is well-developed. He is obese. He is not ill-appearing, toxic-appearing or diaphoretic.   Cardiovascular:      Rate and Rhythm: Normal rate and regular rhythm.   Pulmonary:      Effort: Pulmonary effort is normal.   Abdominal:      General: There is no distension.      Palpations: Abdomen is soft. There is no mass.      Tenderness: There is no abdominal tenderness. There is no guarding or rebound.      Hernia: No hernia is present.   Musculoskeletal:      Cervical back: Normal range of motion.   Skin:     General: " Skin is warm and dry.      Findings: No rash.   Psychiatric:         Mood and Affect: Mood normal.        Result Review :   The following data was reviewed by: Mynor Russ MD on 01/19/2022:  Common labs    Common Labsle 7/9/21 10/7/21 10/7/21 11/17/21 11/17/21 11/17/21     1259 1259 0936 [id number] 0936   Glucose   106 (A)  104 (A)    BUN   22  23    Creatinine   1.32 (A)  1.17    eGFR Non  Am   53 (A)  61    eGFR  Am     71    Sodium   142  141    Potassium   4.4  4.8    Chloride   103  101    Calcium   9.4  9.6    Total Protein     7.0    Albumin   4.20  4.3    Total Bilirubin   0.5  0.6    Alkaline Phosphatase   77  83    AST (SGOT)   21  29    ALT (SGPT)   19  29    WBC 6.80 6.91  6.7     Hemoglobin 15.5 14.1  14.0     Hematocrit 45.8 43.5  43.5     Platelets 244 234  256     Total Cholesterol      158   Triglycerides      99   HDL Cholesterol      53   LDL Cholesterol       87   (A) Abnormal value       Comments are available for some flowsheets but are not being displayed.           Data reviewed: GI studies Reviewed colonoscopy report 2020          Assessment and Plan    Diagnoses and all orders for this visit:    1. Diarrhea, unspecified type (Primary)      Episodic diarrhea prior to recent COVID-19 URI syndrome.  Differential diagnosis includes IBS, viral gastroenteritis, mild food poisoning.  Less likely inflammatory bowel disease.  Very likely not malignancy, given his colonoscopy a couple of years ago and the polypectomies.  No red flag symptoms.  No weight loss.  No fever outside of COVID, and no ongoing stool issues.  At this time I recommend continuing probiotic.  Avoid dairy.  Can use Imodium over-the-counter occasionally as needed.  If the diarrhea recurs and persists more than 2 or 3 weeks he understands to contact me.  Would then need further evaluation.  Lab work.  Possible earlier repeat endoscopy.  He will call with concerns.      Follow Up   No follow-ups on file.  Patient was  given instructions and counseling regarding his condition or for health maintenance advice. Please see specific information pulled into the AVS if appropriate.

## 2022-04-14 ENCOUNTER — LAB (OUTPATIENT)
Dept: OTHER | Facility: HOSPITAL | Age: 77
End: 2022-04-14

## 2022-04-14 ENCOUNTER — OFFICE VISIT (OUTPATIENT)
Dept: ONCOLOGY | Facility: CLINIC | Age: 77
End: 2022-04-14

## 2022-04-14 VITALS
RESPIRATION RATE: 18 BRPM | TEMPERATURE: 97.3 F | HEART RATE: 59 BPM | DIASTOLIC BLOOD PRESSURE: 72 MMHG | WEIGHT: 229.9 LBS | BODY MASS INDEX: 36.95 KG/M2 | SYSTOLIC BLOOD PRESSURE: 130 MMHG | HEIGHT: 66 IN | OXYGEN SATURATION: 95 %

## 2022-04-14 DIAGNOSIS — D50.0 IRON DEFICIENCY ANEMIA DUE TO CHRONIC BLOOD LOSS: ICD-10-CM

## 2022-04-14 DIAGNOSIS — D50.0 IRON DEFICIENCY ANEMIA DUE TO CHRONIC BLOOD LOSS: Primary | ICD-10-CM

## 2022-04-14 LAB
BASOPHILS # BLD AUTO: 0.07 10*3/MM3 (ref 0–0.2)
BASOPHILS NFR BLD AUTO: 1.2 % (ref 0–1.5)
DEPRECATED RDW RBC AUTO: 41.6 FL (ref 37–54)
EOSINOPHIL # BLD AUTO: 0.15 10*3/MM3 (ref 0–0.4)
EOSINOPHIL NFR BLD AUTO: 2.6 % (ref 0.3–6.2)
ERYTHROCYTE [DISTWIDTH] IN BLOOD BY AUTOMATED COUNT: 13.3 % (ref 12.3–15.4)
FERRITIN SERPL-MCNC: 15.7 NG/ML (ref 30–400)
HCT VFR BLD AUTO: 38.7 % (ref 37.5–51)
HGB BLD-MCNC: 12.1 G/DL (ref 13–17.7)
IMM GRANULOCYTES # BLD AUTO: 0.02 10*3/MM3 (ref 0–0.05)
IMM GRANULOCYTES NFR BLD AUTO: 0.3 % (ref 0–0.5)
IRON 24H UR-MRATE: 40 MCG/DL (ref 59–158)
IRON SATN MFR SERPL: 10 % (ref 20–50)
LYMPHOCYTES # BLD AUTO: 1.91 10*3/MM3 (ref 0.7–3.1)
LYMPHOCYTES NFR BLD AUTO: 32.9 % (ref 19.6–45.3)
MCH RBC QN AUTO: 26.8 PG (ref 26.6–33)
MCHC RBC AUTO-ENTMCNC: 31.3 G/DL (ref 31.5–35.7)
MCV RBC AUTO: 85.6 FL (ref 79–97)
MONOCYTES # BLD AUTO: 0.56 10*3/MM3 (ref 0.1–0.9)
MONOCYTES NFR BLD AUTO: 9.7 % (ref 5–12)
NEUTROPHILS NFR BLD AUTO: 3.09 10*3/MM3 (ref 1.7–7)
NEUTROPHILS NFR BLD AUTO: 53.3 % (ref 42.7–76)
NRBC BLD AUTO-RTO: 0 /100 WBC (ref 0–0.2)
PLATELET # BLD AUTO: 237 10*3/MM3 (ref 140–450)
PMV BLD AUTO: 8.8 FL (ref 6–12)
RBC # BLD AUTO: 4.52 10*6/MM3 (ref 4.14–5.8)
TIBC SERPL-MCNC: 420 MCG/DL (ref 298–536)
TRANSFERRIN SERPL-MCNC: 282 MG/DL (ref 200–360)
WBC NRBC COR # BLD: 5.8 10*3/MM3 (ref 3.4–10.8)

## 2022-04-14 PROCEDURE — 83540 ASSAY OF IRON: CPT | Performed by: INTERNAL MEDICINE

## 2022-04-14 PROCEDURE — 36415 COLL VENOUS BLD VENIPUNCTURE: CPT

## 2022-04-14 PROCEDURE — 85025 COMPLETE CBC W/AUTO DIFF WBC: CPT | Performed by: INTERNAL MEDICINE

## 2022-04-14 PROCEDURE — 99214 OFFICE O/P EST MOD 30 MIN: CPT | Performed by: INTERNAL MEDICINE

## 2022-04-14 PROCEDURE — 82728 ASSAY OF FERRITIN: CPT | Performed by: INTERNAL MEDICINE

## 2022-04-14 PROCEDURE — 84466 ASSAY OF TRANSFERRIN: CPT | Performed by: INTERNAL MEDICINE

## 2022-04-14 NOTE — PROGRESS NOTES
Subjective   Dick Galindo is a 76 y.o. male.  Referred by Dr. Rodriguez for evaluation and management of anemia    History of Present Illness   Mr. Dubois is a 75-year-old  male with history of hypertension, hyperlipidemia, BPH status post TURP on 7/7/2020 presents for further evaluation of anemia.  He was scheduled for the TURP procedure on 7/7/2020 at which point a CBC was checked and his hemoglobin was noted to be low at 9.2.  This prompted further work-up with vitamin B12, folic acid, iron studies.  The vitamin B12 and folic acid levels were noted to be normal.  Iron studies showed a low ferritin consistent with iron deficiency.  He was then placed on oral iron which he has been taking for the past 2 weeks.  Today a repeat CBC shows an improvement in hemoglobin to 11.2.  He denies any lightheadedness, dizziness, chest pain, dyspnea, palpitations, lower extremity edema.  He reports having lost about 10 pounds over the past 2 weeks, he attributes this to changes in his diet as well as decreased appetite since the surgery.  He denies noting any blood in his stool any melena.  Denies any hematemesis.  He has been on Prevacid for a long time now for GERD.  His last colonoscopy was in 2018 on which there was diverticulosis, polyps and internal hemorrhoids noted.  EGD was also performed in 2018 on which mildly erythematous mucosa of the esophagus was noted but no other abnormalities.  He was scheduled for a colonoscopy in February 2020 by Dr. Roper however this has been delayed because of COVID and he plans to undergo colonoscopy soon.  6/3/2021 EGD-Z-line is irregular, 39 cm from the incisors.  Biopsied  Erythematous mucosa in the stomach biopsied.  No gross lesions in the entire examined duodenum.  Stomach biopsies consistent with scattered changes suggestive of mild reactive gastropathy.  GE junction biopsy with mild esophagitis and focal goblet cell metaplasia Castellon's esophagus.    Interval history  Patient  presents today for follow-up.  He has quit taking oral iron since his last visit.  He has no new complaints at this time.  Denies any bright red blood per rectum or dark-colored stools.  No nausea vomiting abdominal pain.  Eats mostly white meats and some grains.  Does not eat much red meats.        The following portions of the patient's history were reviewed and updated as appropriate: allergies, current medications, past family history, past medical history, past social history, past surgical history and problem list.    Past Medical History:   Diagnosis Date   • Anxiety    • Asthma     mild case, allergies   • Bronchitis    • Colon polyps    • Diverticulitis    • Enlarged prostate    • GERD (gastroesophageal reflux disease)    • H/O bladder infections    • H/O bronchitis    • High cholesterol    • History of anemia    • Hypertension    • Hypothyroidism    • Seasonal allergies    • Sleep apnea     CPAP   • Slow urinary stream         Past Surgical History:   Procedure Laterality Date   • COLONOSCOPY N/A approx 2012   • COLONOSCOPY N/A 2/6/2018    Procedure: COLONOSCOPY INTO CEECUM AND TERMINAL MILEUM WITH COLD BIOPSY POLYPECTOMIES & COLD BIOIPSIES;  Surgeon: Usman Roper MD;  Location: [facility] ENDOSCOPY;  Service:    • COLONOSCOPY N/A 12/3/2020    Procedure: COLONOSCOPY TO CECUM AND INTO TI WITH COLD BIOPSIES AND COLD BIOPSY POLYPECTOMY;  Surgeon: Usman Roper MD;  Location: [facility] ENDOSCOPY;  Service: Gastroenterology;  Laterality: N/A;  pre: history of colon polyps  post: diverticulosis, polyp, and internal hemorrhoids   • CYSTOSCOPY TRANSURETHRAL RESECTION OF PROSTATE N/A 7/6/2020    Procedure: TRANSURETHRAL RESECTION OF PROSTATE;  Surgeon: Charanjit Cameron MD;  Location: [facility] OR;  Service: Urology;  Laterality: N/A;   • ENDOSCOPY N/A 2/6/2018    Procedure: ESOPHAGOGASTRODUODENOSCOPY WITH COLD BIOPSIES;  Surgeon: Usman Roper MD;  Location: [facility] ENDOSCOPY;  Service:    • ENDOSCOPY N/A 6/3/2021     Procedure: ESOPHAGOGASTRODUODENOSCOPY WITH COLD BIOPSIES;  Surgeon: Usman Roper MD;  Location: [facility] ENDOSCOPY;  Service: Gastroenterology;  Laterality: N/A;  PRE:IRON DEFICIENCY ANEMIA  POST: GASTRITIS   • TONSILLECTOMY Bilateral         Family History   Problem Relation Age of Onset   • Stroke Father    • Heart attack Father    • No Known Problems Maternal Grandmother    • No Known Problems Maternal Grandfather    • No Known Problems Paternal Grandmother    • No Known Problems Paternal Grandfather    • Heart disease Brother    • Malig Hyperthermia Neg Hx    • Colon cancer Neg Hx    • Colon polyps Neg Hx         Social History     Socioeconomic History   • Marital status:    Tobacco Use   • Smoking status: Former Smoker     Packs/day: 2.00     Years: 14.00     Pack years: 28.00     Types: Cigarettes     Quit date:      Years since quittin.3   • Smokeless tobacco: Never Used   • Tobacco comment: He smoked between the ages of 18 and 32.   Vaping Use   • Vaping Use: Never used   Substance and Sexual Activity   • Alcohol use: Yes     Alcohol/week: 6.0 standard drinks     Types: 6 Standard drinks or equivalent per week     Comment: occ.   • Drug use: No   • Sexual activity: Defer             Allergies   Allergen Reactions   • Amoxicillin Itching and Swelling   • Levaquin [Levofloxacin] Nausea And Vomiting            Review of Systems   Constitutional: Negative for activity change, appetite change, chills, diaphoresis, fatigue, fever, unexpected weight gain and unexpected weight loss.   HENT: Negative for congestion, dental problem, drooling, ear discharge, ear pain, facial swelling, hearing loss, mouth sores, nosebleeds, postnasal drip, rhinorrhea, sinus pressure, sneezing, sore throat, swollen glands, tinnitus, trouble swallowing and voice change.    Eyes: Negative for blurred vision, double vision, photophobia, pain, discharge, redness, itching and visual disturbance.   Respiratory: Negative for  "apnea, cough, choking, chest tightness, shortness of breath, wheezing and stridor.    Cardiovascular: Negative for chest pain, palpitations and leg swelling.   Gastrointestinal: Positive for GERD. Negative for abdominal distention, abdominal pain, anal bleeding, blood in stool, constipation, diarrhea, nausea, rectal pain, vomiting and indigestion.   Endocrine: Negative for cold intolerance, heat intolerance, polydipsia, polyphagia and polyuria.   Genitourinary: Negative for decreased urine volume, difficulty urinating, discharge, dysuria, flank pain, frequency, genital sores, hematuria, nocturia, erectile dysfunction and urgency.   Musculoskeletal: Negative for arthralgias, back pain, gait problem, joint swelling, myalgias, neck pain, neck stiffness and bursitis.   Skin: Negative for color change, dry skin, pallor, rash, skin lesions and wound.   Allergic/Immunologic: Negative for environmental allergies, food allergies and immunocompromised state.   Neurological: Negative for dizziness, tremors, seizures, syncope, facial asymmetry, speech difficulty, weakness, light-headedness, numbness, headache, memory problem and confusion.   Hematological: Negative for adenopathy. Does not bruise/bleed easily.   Psychiatric/Behavioral: Negative for agitation, behavioral problems, decreased concentration, dysphoric mood, hallucinations, self-injury, sleep disturbance, suicidal ideas, negative for hyperactivity, depressed mood and stress. The patient is not nervous/anxious.      Review of systems as mentioned in the HPI    Objective   Blood pressure 130/72, pulse 59, temperature 97.3 °F (36.3 °C), temperature source Temporal, resp. rate 18, height 167.6 cm (66\"), weight 104 kg (229 lb 14.4 oz), SpO2 95 %.   Physical Exam   Constitutional: He is oriented to person, place, and time. He appears well-developed and well-nourished. No distress.   HENT:   Head: Normocephalic and atraumatic.   Right Ear: External ear normal.   Left Ear: " External ear normal.   Nose: Nose normal.   Mouth/Throat: Oropharynx is clear and moist. No oropharyngeal exudate.   Eyes: Pupils are equal, round, and reactive to light. Conjunctivae are normal. Right eye exhibits no discharge. Left eye exhibits no discharge. No scleral icterus.   Neck: No JVD present. No tracheal deviation present. No thyromegaly present.   Cardiovascular: Normal rate, regular rhythm and normal heart sounds. Exam reveals no gallop and no friction rub.   No murmur heard.  Pulmonary/Chest: Effort normal and breath sounds normal. No stridor. No respiratory distress. He has no wheezes. He has no rales. He exhibits no tenderness.   Abdominal: Soft. Bowel sounds are normal. He exhibits no distension and no mass. There is no abdominal tenderness. There is no rebound and no guarding.   Musculoskeletal: Normal range of motion. No tenderness or deformity.   Lymphadenopathy:     He has no cervical adenopathy.   Neurological: He is alert and oriented to person, place, and time. No cranial nerve deficit. Coordination normal.   Skin: Skin is warm and dry. No rash noted. He is not diaphoretic. No erythema. No pallor.   Psychiatric: His behavior is normal. Judgment and thought content normal.   Vitals reviewed.    I have reexamined the patient and the results are consistent with the previously documented exam. Harika Hein MD     Lab on 04/14/2022   Component Date Value Ref Range Status   • WBC 04/14/2022 5.80  3.40 - 10.80 10*3/mm3 Final   • RBC 04/14/2022 4.52  4.14 - 5.80 10*6/mm3 Final   • Hemoglobin 04/14/2022 12.1 (A) 13.0 - 17.7 g/dL Final   • Hematocrit 04/14/2022 38.7  37.5 - 51.0 % Final   • MCV 04/14/2022 85.6  79.0 - 97.0 fL Final   • MCH 04/14/2022 26.8  26.6 - 33.0 pg Final   • MCHC 04/14/2022 31.3 (A) 31.5 - 35.7 g/dL Final   • RDW 04/14/2022 13.3  12.3 - 15.4 % Final   • RDW-SD 04/14/2022 41.6  37.0 - 54.0 fl Final   • MPV 04/14/2022 8.8  6.0 - 12.0 fL Final   • Platelets 04/14/2022 237  140 -  450 10*3/mm3 Final   • Neutrophil % 04/14/2022 53.3  42.7 - 76.0 % Final   • Lymphocyte % 04/14/2022 32.9  19.6 - 45.3 % Final   • Monocyte % 04/14/2022 9.7  5.0 - 12.0 % Final   • Eosinophil % 04/14/2022 2.6  0.3 - 6.2 % Final   • Basophil % 04/14/2022 1.2  0.0 - 1.5 % Final   • Immature Grans % 04/14/2022 0.3  0.0 - 0.5 % Final   • Neutrophils, Absolute 04/14/2022 3.09  1.70 - 7.00 10*3/mm3 Final   • Lymphocytes, Absolute 04/14/2022 1.91  0.70 - 3.10 10*3/mm3 Final   • Monocytes, Absolute 04/14/2022 0.56  0.10 - 0.90 10*3/mm3 Final   • Eosinophils, Absolute 04/14/2022 0.15  0.00 - 0.40 10*3/mm3 Final   • Basophils, Absolute 04/14/2022 0.07  0.00 - 0.20 10*3/mm3 Final   • Immature Grans, Absolute 04/14/2022 0.02  0.00 - 0.05 10*3/mm3 Final   • nRBC 04/14/2022 0.0  0.0 - 0.2 /100 WBC Final        No radiology results for the last 30 days.     4/14/2022  CBC-WBC 5.8, hemoglobin 12.1 which is a decrease from 14 4 months ago, platelets 227  Ferritin low at 15.7  Iron studies show low iron saturation 10%, transferrin 282, TIBC 420      Assessment/Plan      Mr. Holder is a 76-year-old  male referred for further work-up of anemia.  On reviewing the labs he is noted to have low hemoglobin since May 2019.  Ferritin was normal in the past however most recent ferritin from 7/8/2020 noted to be low at 12.  This is indicative of iron deficiency.  He has been on oral iron and has had a good response.    1.anemia  Work-up consistent with iron deficiency   Had a colonoscopy in December 2020 which does not show any evidence of bleeding  EGD June 2021 with esophagitis and Castellon's esophagus.  He had some hemorrhoidal bleeding with stopped about 3 months ago.  He continues on oral iron every other day.  Hemoglobin today is normal at 14.1.  Ferritin normal at 36.1, iron profile shows an iron saturation of 21% and TIBC 337  Has not been on oral iron for about 4 to 5 months now.  Hemoglobin decreased to 12.1  Iron studies  suggestive of iron deficiency  Restart oral iron  We will recheck iron studies and CBC in 2 months.  He would likely benefit from a small bowel capsule study.    2.hypertension-blood pressure well controlled, continue current medications, blood pressure 130/72    3.GI work-up for iron deficiency anemia-C-scope December 2020 with no evidence of bleeding. EGD with Castellon's esophagitis  Mediate evaluation of the small bowel.    4.hyperlipidemia , continue atorvastatin, stable    5.mood-continue Zoloft, stable    6.obesity-BMI 37.1.  Today we discussed about dietary modification and regular exercise.     7.follow-up-2 months with CBC and iron studies

## 2022-04-19 NOTE — TELEPHONE ENCOUNTER
Rx Refill Note  Requested Prescriptions     Pending Prescriptions Disp Refills   • sertraline (ZOLOFT) 50 MG tablet 90 tablet 1     Sig: Take 1 tablet by mouth Daily.      Last office visit with prescribing clinician: 01/19/2022      Next office visit with prescribing clinician: 06/07/2022   {TIP  Encounters:23}         Saray Alonso MA  04/19/22, 12:40 EDT

## 2022-05-03 ENCOUNTER — OFFICE VISIT (OUTPATIENT)
Dept: FAMILY MEDICINE CLINIC | Facility: CLINIC | Age: 77
End: 2022-05-03

## 2022-05-03 VITALS
HEIGHT: 66 IN | SYSTOLIC BLOOD PRESSURE: 151 MMHG | DIASTOLIC BLOOD PRESSURE: 71 MMHG | HEART RATE: 64 BPM | WEIGHT: 231.3 LBS | TEMPERATURE: 98.2 F | OXYGEN SATURATION: 96 % | BODY MASS INDEX: 37.17 KG/M2

## 2022-05-03 DIAGNOSIS — R05.9 COUGH: ICD-10-CM

## 2022-05-03 DIAGNOSIS — R09.89 CHEST CONGESTION: ICD-10-CM

## 2022-05-03 DIAGNOSIS — J40 BRONCHITIS: Primary | ICD-10-CM

## 2022-05-03 LAB
EXPIRATION DATE: NORMAL
FLUAV AG UPPER RESP QL IA.RAPID: NOT DETECTED
FLUBV AG UPPER RESP QL IA.RAPID: NOT DETECTED
INTERNAL CONTROL: NORMAL
Lab: NORMAL
SARS-COV-2 AG UPPER RESP QL IA.RAPID: NOT DETECTED

## 2022-05-03 PROCEDURE — 87428 SARSCOV & INF VIR A&B AG IA: CPT | Performed by: FAMILY MEDICINE

## 2022-05-03 PROCEDURE — 99213 OFFICE O/P EST LOW 20 MIN: CPT | Performed by: FAMILY MEDICINE

## 2022-05-03 RX ORDER — AZITHROMYCIN 250 MG/1
TABLET, FILM COATED ORAL
Qty: 6 TABLET | Refills: 0 | Status: SHIPPED | OUTPATIENT
Start: 2022-05-03 | End: 2022-06-07

## 2022-05-03 NOTE — PROGRESS NOTES
"Chief Complaint  Cough and Nasal Congestion (x2 week otc meds not working )    Subjective          Dick Galindo presents to [facility]  History of Present Illness     2 weeks of URI symptoms.  Sinus congestion runny eyes cough sore throat some dark mucus he is coughing up.  He feels a little feverish but no documented temperature.  2 other family members have similar symptoms.  His is not improving.  Otherwise feels fine.  No shortness of breath.  Possibly some wheezing.    Objective   Vital Signs:   /71   Pulse 64   Temp 98.2 °F (36.8 °C) (Temporal)   Ht 167.6 cm (65.98\")   Wt 105 kg (231 lb 4.8 oz)   SpO2 96%   BMI 37.36 kg/m²     Physical Exam  Constitutional:       General: He is not in acute distress.     Appearance: Normal appearance. He is not ill-appearing.   HENT:      Head: Normocephalic and atraumatic.      Right Ear: Tympanic membrane and ear canal normal.      Left Ear: Tympanic membrane and ear canal normal.      Nose: Congestion and rhinorrhea present.      Mouth/Throat:      Mouth: Mucous membranes are moist.      Pharynx: Oropharynx is clear. No oropharyngeal exudate or posterior oropharyngeal erythema.   Eyes:      Conjunctiva/sclera: Conjunctivae normal.   Cardiovascular:      Rate and Rhythm: Normal rate.      Pulses: Normal pulses.   Pulmonary:      Effort: Pulmonary effort is normal.      Comments: He has scattered rhonchi bilaterally.  No wheezing.  Musculoskeletal:      Cervical back: Normal range of motion and neck supple. No tenderness.   Lymphadenopathy:      Cervical: No cervical adenopathy.   Neurological:      Mental Status: He is alert.        Result Review :                 Assessment and Plan    Diagnoses and all orders for this visit:    1. Bronchitis (Primary)    2. Cough  -     POCT SARS-CoV-2 Antigen ROSE MARIE + Flu    3. Chest congestion  -     POCT SARS-CoV-2 Antigen ROSE MARIE + Flu    Other orders  -     azithromycin (Zithromax Z-Matteo) 250 MG " tablet; Take 2 tablets the first day, then 1 tablet daily for 4 days.  Dispense: 6 tablet; Refill: 0      Bronchitis.  Viral versus atypical.  Prescribing azithromycin 5-day pack.  This may also have an anti-inflammatory effect.  With worsening symptoms he will seek medical attention.  If not improved in 2 or 3 weeks he will let us know.  He was cautioned the cough may persist.             Follow Up   No follow-ups on file.  Patient was given instructions and counseling regarding his condition or for health maintenance advice. Please see specific information pulled into the AVS if appropriate.

## 2022-05-16 ENCOUNTER — TELEPHONE (OUTPATIENT)
Dept: GASTROENTEROLOGY | Facility: CLINIC | Age: 77
End: 2022-05-16

## 2022-05-16 ENCOUNTER — OFFICE VISIT (OUTPATIENT)
Dept: GASTROENTEROLOGY | Facility: CLINIC | Age: 77
End: 2022-05-16

## 2022-05-16 VITALS
HEIGHT: 66 IN | BODY MASS INDEX: 36.9 KG/M2 | HEART RATE: 60 BPM | WEIGHT: 229.6 LBS | DIASTOLIC BLOOD PRESSURE: 72 MMHG | SYSTOLIC BLOOD PRESSURE: 132 MMHG | TEMPERATURE: 97.1 F | OXYGEN SATURATION: 95 %

## 2022-05-16 DIAGNOSIS — K63.5 POLYP OF COLON, UNSPECIFIED PART OF COLON, UNSPECIFIED TYPE: ICD-10-CM

## 2022-05-16 DIAGNOSIS — D50.0 IRON DEFICIENCY ANEMIA DUE TO CHRONIC BLOOD LOSS: ICD-10-CM

## 2022-05-16 DIAGNOSIS — R19.5 HEME POSITIVE STOOL: ICD-10-CM

## 2022-05-16 DIAGNOSIS — K22.719 BARRETT'S ESOPHAGUS WITH DYSPLASIA: ICD-10-CM

## 2022-05-16 DIAGNOSIS — K21.9 GASTROESOPHAGEAL REFLUX DISEASE WITHOUT ESOPHAGITIS: Primary | ICD-10-CM

## 2022-05-16 PROCEDURE — 99214 OFFICE O/P EST MOD 30 MIN: CPT | Performed by: INTERNAL MEDICINE

## 2022-05-16 RX ORDER — HYDROCHLOROTHIAZIDE 12.5 MG/1
12.5 TABLET ORAL DAILY
Qty: 90 TABLET | Refills: 1 | Status: SHIPPED | OUTPATIENT
Start: 2022-05-16 | End: 2022-11-15 | Stop reason: SDUPTHER

## 2022-05-16 RX ORDER — CARVEDILOL 6.25 MG/1
6.25 TABLET ORAL 2 TIMES DAILY WITH MEALS
Qty: 180 TABLET | Refills: 1 | Status: SHIPPED | OUTPATIENT
Start: 2022-05-16 | End: 2022-11-15 | Stop reason: SDUPTHER

## 2022-05-16 RX ORDER — AMLODIPINE BESYLATE 10 MG/1
10 TABLET ORAL DAILY
Qty: 90 TABLET | Refills: 1 | Status: SHIPPED | OUTPATIENT
Start: 2022-05-16 | End: 2022-11-15 | Stop reason: SDUPTHER

## 2022-05-16 RX ORDER — LEVOTHYROXINE SODIUM 0.07 MG/1
75 TABLET ORAL DAILY
Qty: 90 TABLET | Refills: 1 | Status: SHIPPED | OUTPATIENT
Start: 2022-05-16 | End: 2022-11-15 | Stop reason: SDUPTHER

## 2022-05-16 NOTE — TELEPHONE ENCOUNTER
spoke with pt in office scheduled at [facility] on may 31 arrive at 1130 am [facility]---mirlax instructional packet handed to pt in office---put on per [facility]

## 2022-05-16 NOTE — TELEPHONE ENCOUNTER
Rx Refill Note  Requested Prescriptions     Pending Prescriptions Disp Refills   • amLODIPine (NORVASC) 10 MG tablet 90 tablet 1     Sig: Take 1 tablet by mouth Daily.   • hydroCHLOROthiazide (HYDRODIURIL) 12.5 MG tablet 90 tablet 1     Sig: Take 1 tablet by mouth Daily.   • levothyroxine (SYNTHROID, LEVOTHROID) 75 MCG tablet 90 tablet 1     Sig: Take 1 tablet by mouth Daily.      Last office visit with prescribing clinician: 05/03/2022      Next office visit with prescribing clinician: 06/07/2022            Saray Alonso MA  05/16/22, 14:44 EDT

## 2022-05-16 NOTE — TELEPHONE ENCOUNTER
Rx Refill Note  Requested Prescriptions     Pending Prescriptions Disp Refills   • carvedilol (COREG) 6.25 MG tablet 180 tablet 1     Sig: Take 1 tablet by mouth 2 (Two) Times a Day With Meals.      Last office visit with prescribing clinician: 5/3/2022      Next office visit with prescribing clinician: 6/7/2022            Sarath Friend  05/16/22, 09:04 EDT

## 2022-05-16 NOTE — PROGRESS NOTES
Chief Complaint   Patient presents with   • Follow-up     GERD       History of Present Illness:   76 y.o. male who I last saw in 11 of 2021:  Assessment:  1. H/o colon polyps. His last colonoscopy was in 12/20. I recommended a colonoscopy in 3 yrs.   2. GERD  3. Castellon's esophagus. Last EGD in 6/21. I recommended repeat EGD in 3-5 yrs.   4. H/o iron def anemia. He is heme negative today.     Recommendations:  1. Continue Prevacid 30 mg/day.  2. To f/u with Hematologist  3. F/u 6 mos. If he continues to be iron def then maybe we do need to evaluate his small bowel?       He is on iron sulfate pills one every other day. No rectal bleeding or melena. NO abdomiinal or chest pain. No nausea or vomting. NO fevers, chills. No diarrhea or constipatiaon. Avoids NSAIDs use. NO dysphagia.     Past Medical History:   Diagnosis Date   • Anxiety    • Asthma     mild case, allergies   • Castellon esophagus    • Bronchitis    • Colon polyps    • Diverticulitis    • Enlarged prostate    • GERD (gastroesophageal reflux disease)    • H/O bladder infections    • H/O bronchitis    • High cholesterol    • History of anemia    • Hypertension    • Hypothyroidism    • Seasonal allergies    • Sleep apnea     CPAP   • Slow urinary stream        Past Surgical History:   Procedure Laterality Date   • COLONOSCOPY N/A approx 2012   • COLONOSCOPY N/A 02/06/2018    Procedure: COLONOSCOPY INTO CEECUM AND TERMINAL MILEUM WITH COLD BIOPSY POLYPECTOMIES & COLD BIOIPSIES;  Surgeon: Usman Roper MD;  Location: [facility] ENDOSCOPY;  Service:    • COLONOSCOPY N/A 12/03/2020    Procedure: COLONOSCOPY TO CECUM AND INTO TI WITH COLD BIOPSIES AND COLD BIOPSY POLYPECTOMY;  Surgeon: Usman Roper MD;  Location: [facility] ENDOSCOPY;  Service: Gastroenterology;  Laterality: N/A;  pre: history of colon polyps  post: diverticulosis, polyp, and internal hemorrhoids   • CYSTOSCOPY TRANSURETHRAL RESECTION OF PROSTATE N/A 07/06/2020    Procedure: TRANSURETHRAL RESECTION OF  PROSTATE;  Surgeon: Charanjit Cameron MD;  Location: [facility] MAIN OR;  Service: Urology;  Laterality: N/A;   • ENDOSCOPY N/A 02/06/2018    Procedure: ESOPHAGOGASTRODUODENOSCOPY WITH COLD BIOPSIES;  Surgeon: Usman Roper MD;  Location: [facility] ENDOSCOPY;  Service:    • ENDOSCOPY N/A 06/03/2021    Procedure: ESOPHAGOGASTRODUODENOSCOPY WITH COLD BIOPSIES;  Surgeon: Usman Roper MD;  Location: [facility] ENDOSCOPY;  Service: Gastroenterology;  Laterality: N/A;  PRE:IRON DEFICIENCY ANEMIA  POST: GASTRITIS   • TONSILLECTOMY Bilateral 1954   • UPPER GASTROINTESTINAL ENDOSCOPY           Current Outpatient Medications:   •  acetaminophen (TYLENOL) 325 MG tablet, Take 650 mg by mouth Every 6 (Six) Hours As Needed., Disp: , Rfl:   •  albuterol sulfate HFA (Ventolin HFA) 108 (90 Base) MCG/ACT inhaler, 2 puffs Q4H for cough and wheeze, Disp: 18 g, Rfl: 1  •  amLODIPine (NORVASC) 10 MG tablet, Take 1 tablet by mouth Daily., Disp: 90 tablet, Rfl: 1  •  atorvastatin (LIPITOR) 40 MG tablet, Take 1 tablet by mouth Daily., Disp: 90 tablet, Rfl: 1  •  azithromycin (Zithromax Z-Matteo) 250 MG tablet, Take 2 tablets the first day, then 1 tablet daily for 4 days., Disp: 6 tablet, Rfl: 0  •  carvedilol (COREG) 6.25 MG tablet, Take 1 tablet by mouth 2 (Two) Times a Day With Meals., Disp: 180 tablet, Rfl: 1  •  hydroCHLOROthiazide (HYDRODIURIL) 12.5 MG tablet, Take 1 tablet by mouth Daily., Disp: 90 tablet, Rfl: 1  •  lansoprazole (PREVACID) 30 MG capsule, Take 1 capsule by mouth Daily., Disp: 90 capsule, Rfl: 3  •  levothyroxine (SYNTHROID, LEVOTHROID) 75 MCG tablet, Take 1 tablet by mouth Daily., Disp: 90 tablet, Rfl: 1  •  lidocaine (XYLOCAINE) 2 % jelly, Apply to affected area daily prn, Disp: 1 each, Rfl: 3  •  lisinopril (PRINIVIL,ZESTRIL) 40 MG tablet, Take 1 tablet by mouth Daily., Disp: 90 tablet, Rfl: 1  •  sertraline (ZOLOFT) 50 MG tablet, Take 1 tablet by mouth Daily., Disp: 90 tablet, Rfl: 1    Allergies   Allergen Reactions   •  Amoxicillin Itching and Swelling   • Levaquin [Levofloxacin] Nausea And Vomiting       Family History   Problem Relation Age of Onset   • Stroke Father    • Heart attack Father    • No Known Problems Maternal Grandmother    • No Known Problems Maternal Grandfather    • No Known Problems Paternal Grandmother    • No Known Problems Paternal Grandfather    • Heart disease Brother    • Malig Hyperthermia Neg Hx    • Colon cancer Neg Hx    • Colon polyps Neg Hx        Social History     Socioeconomic History   • Marital status:    Tobacco Use   • Smoking status: Former Smoker     Packs/day: 2.00     Years: 14.00     Pack years: 28.00     Types: Cigarettes     Quit date: 1980     Years since quittin.4   • Smokeless tobacco: Never Used   • Tobacco comment: He smoked between the ages of 18 and 32.   Vaping Use   • Vaping Use: Never used   Substance and Sexual Activity   • Alcohol use: Yes     Alcohol/week: 4.0 standard drinks     Types: 4 Glasses of wine per week     Comment: occ.   • Drug use: No   • Sexual activity: Yes       Review of Systems   Gastrointestinal: Negative for abdominal pain and anal bleeding.   All other systems reviewed and are negative.    Pertinent positives and negatives documented in the HPI and all other systems reviewed and were found to be negative.  Vitals:    22 0814   BP: 132/72   Pulse: 60   Temp: 97.1 °F (36.2 °C)   SpO2: 95%       Physical Exam  Vitals reviewed.   Constitutional:       General: He is not in acute distress.     Appearance: Normal appearance. He is well-developed. He is not diaphoretic.   HENT:      Head: Normocephalic and atraumatic. Hair is normal.      Right Ear: Hearing, tympanic membrane, ear canal and external ear normal.      Left Ear: Hearing, tympanic membrane, ear canal and external ear normal.      Nose: Nose normal. No nasal deformity.      Mouth/Throat:      Mouth: Mucous membranes are moist. No oral lesions.      Pharynx: Uvula midline. No  uvula swelling.   Eyes:      General: Lids are normal. No scleral icterus.        Right eye: No discharge.         Left eye: No discharge.      Extraocular Movements: Extraocular movements intact.      Right eye: Normal extraocular motion and no nystagmus.      Left eye: Normal extraocular motion and no nystagmus.      Conjunctiva/sclera: Conjunctivae normal.      Pupils: Pupils are equal, round, and reactive to light.   Neck:      Thyroid: No thyromegaly.      Vascular: No JVD.   Cardiovascular:      Rate and Rhythm: Normal rate and regular rhythm.      Pulses: Normal pulses.      Heart sounds: Normal heart sounds. No murmur heard.    No gallop.   Pulmonary:      Effort: Pulmonary effort is normal. No respiratory distress.      Breath sounds: Normal breath sounds. No wheezing or rales.   Chest:      Chest wall: No tenderness.   Abdominal:      General: Bowel sounds are normal. There is no distension.      Palpations: Abdomen is soft. There is no mass.      Tenderness: There is no abdominal tenderness. There is no guarding.      Hernia: No hernia is present.   Genitourinary:     Rectum: Normal. Guaiac result positive.   Musculoskeletal:         General: No tenderness or deformity. Normal range of motion.      Cervical back: Normal range of motion and neck supple.   Lymphadenopathy:      Cervical: No cervical adenopathy.   Skin:     General: Skin is warm and dry.      Findings: No rash.   Neurological:      Mental Status: He is alert and oriented to person, place, and time.      Cranial Nerves: No cranial nerve deficit.      Motor: No abnormal muscle tone.      Coordination: Coordination normal.      Deep Tendon Reflexes: Reflexes are normal and symmetric. Reflexes normal.   Psychiatric:         Mood and Affect: Mood normal.         Behavior: Behavior normal.         Thought Content: Thought content normal.         Judgment: Judgment normal.         Diagnoses and all orders for this visit:    1. Gastroesophageal  reflux disease without esophagitis (Primary)  -     CBC & Differential  -     Ferritin  -     Iron Profile    2. Polyp of colon, unspecified part of colon, unspecified type  -     CBC & Differential  -     Ferritin  -     Iron Profile    3. Castellon's esophagus with dysplasia  -     CBC & Differential  -     Ferritin  -     Iron Profile    4. Iron deficiency anemia due to chronic blood loss  -     CBC & Differential  -     Ferritin  -     Iron Profile    5. Heme positive stool      Assessment:  1. H/o colon polyps. His last colonoscopy was in 12/20. I recommended a colonoscopy in 3 yrs.   2. GERD  3. Castellon's esophagus. Last EGD in 6/21. I recommended repeat EGD in 3-5 yrs.   4. H/o iron def anemia. He is heme positive today.  His Hematologist was recommending a Capsule Endoscopy.     Recommendations:  1. CBC, ferritin  2. EGD and colonoscopy in the next 4 weeks.   3. If the EGD and colonoscopy are unrevealing then get a Capsule Endoscopy.   4.     No follow-ups on file.    Usman Roper MD  5/16/2022

## 2022-05-17 LAB
BASOPHILS # BLD AUTO: 0.1 X10E3/UL (ref 0–0.2)
BASOPHILS NFR BLD AUTO: 1 %
EOSINOPHIL # BLD AUTO: 0.2 X10E3/UL (ref 0–0.4)
EOSINOPHIL NFR BLD AUTO: 3 %
ERYTHROCYTE [DISTWIDTH] IN BLOOD BY AUTOMATED COUNT: 14.4 % (ref 11.6–15.4)
FERRITIN SERPL-MCNC: 32 NG/ML (ref 30–400)
HCT VFR BLD AUTO: 41.4 % (ref 37.5–51)
HGB BLD-MCNC: 13.1 G/DL (ref 13–17.7)
IMM GRANULOCYTES # BLD AUTO: 0 X10E3/UL (ref 0–0.1)
IMM GRANULOCYTES NFR BLD AUTO: 0 %
IRON SATN MFR SERPL: 17 % (ref 15–55)
IRON SERPL-MCNC: 50 UG/DL (ref 38–169)
LYMPHOCYTES # BLD AUTO: 1.7 X10E3/UL (ref 0.7–3.1)
LYMPHOCYTES NFR BLD AUTO: 24 %
MCH RBC QN AUTO: 27.3 PG (ref 26.6–33)
MCHC RBC AUTO-ENTMCNC: 31.6 G/DL (ref 31.5–35.7)
MCV RBC AUTO: 86 FL (ref 79–97)
MONOCYTES # BLD AUTO: 0.6 X10E3/UL (ref 0.1–0.9)
MONOCYTES NFR BLD AUTO: 9 %
NEUTROPHILS # BLD AUTO: 4.4 X10E3/UL (ref 1.4–7)
NEUTROPHILS NFR BLD AUTO: 63 %
PLATELET # BLD AUTO: 301 X10E3/UL (ref 150–450)
RBC # BLD AUTO: 4.79 X10E6/UL (ref 4.14–5.8)
TIBC SERPL-MCNC: 303 UG/DL (ref 250–450)
UIBC SERPL-MCNC: 253 UG/DL (ref 111–343)
WBC # BLD AUTO: 6.9 X10E3/UL (ref 3.4–10.8)

## 2022-05-24 NOTE — PROGRESS NOTES
05/24/22       Tell him that his labs show that his Hgb is normal at 13.1. His iron level (ferritin of 32) is low normal. I would continue taking the iron pills. When I see him in 6 mos I will recheck his CBC and iron level (ferritin).       Send a copy of this report to his PCP.   Julia hartmann

## 2022-05-25 ENCOUNTER — TELEPHONE (OUTPATIENT)
Dept: GASTROENTEROLOGY | Facility: CLINIC | Age: 77
End: 2022-05-25

## 2022-05-25 NOTE — TELEPHONE ENCOUNTER
----- Message from Usman Roper MD sent at 5/24/2022 12:52 PM EDT -----  05/24/22       Tell him that his labs show that his Hgb is normal at 13.1. His iron level (ferritin of 32) is low normal. I would continue taking the iron pills. When I see him in 6 mos I will recheck his CBC and iron level (ferritin).       Send a copy of this report to his PCP.   Julia hartmann

## 2022-05-25 NOTE — TELEPHONE ENCOUNTER
Called pt and advised of Dr Roper's note.Verb understanding.     F/u appt made for 11/14/2022 at 10a with Dr Roper.     REsults sent to Dr Russ thru [facility].

## 2022-05-31 ENCOUNTER — ANESTHESIA EVENT (OUTPATIENT)
Dept: GASTROENTEROLOGY | Facility: HOSPITAL | Age: 77
End: 2022-05-31

## 2022-05-31 ENCOUNTER — ANESTHESIA (OUTPATIENT)
Dept: GASTROENTEROLOGY | Facility: HOSPITAL | Age: 77
End: 2022-05-31

## 2022-05-31 ENCOUNTER — HOSPITAL ENCOUNTER (OUTPATIENT)
Facility: HOSPITAL | Age: 77
Setting detail: HOSPITAL OUTPATIENT SURGERY
Discharge: HOME | End: 2022-05-31
Attending: INTERNAL MEDICINE | Admitting: INTERNAL MEDICINE

## 2022-05-31 VITALS
HEIGHT: 66 IN | HEART RATE: 63 BPM | BODY MASS INDEX: 36.13 KG/M2 | RESPIRATION RATE: 12 BRPM | OXYGEN SATURATION: 97 % | SYSTOLIC BLOOD PRESSURE: 161 MMHG | DIASTOLIC BLOOD PRESSURE: 80 MMHG | WEIGHT: 224.8 LBS

## 2022-05-31 DIAGNOSIS — D50.0 IRON DEFICIENCY ANEMIA DUE TO CHRONIC BLOOD LOSS: ICD-10-CM

## 2022-05-31 DIAGNOSIS — K63.5 POLYP OF COLON, UNSPECIFIED PART OF COLON, UNSPECIFIED TYPE: ICD-10-CM

## 2022-05-31 DIAGNOSIS — K21.9 GASTROESOPHAGEAL REFLUX DISEASE WITHOUT ESOPHAGITIS: ICD-10-CM

## 2022-05-31 DIAGNOSIS — K22.719 BARRETT'S ESOPHAGUS WITH DYSPLASIA: ICD-10-CM

## 2022-05-31 DIAGNOSIS — R19.5 HEME POSITIVE STOOL: ICD-10-CM

## 2022-05-31 PROCEDURE — 88305 TISSUE EXAM BY PATHOLOGIST: CPT | Performed by: INTERNAL MEDICINE

## 2022-05-31 PROCEDURE — 45380 COLONOSCOPY AND BIOPSY: CPT | Performed by: INTERNAL MEDICINE

## 2022-05-31 PROCEDURE — 43239 EGD BIOPSY SINGLE/MULTIPLE: CPT | Performed by: INTERNAL MEDICINE

## 2022-05-31 PROCEDURE — 25010000002 PROPOFOL 10 MG/ML EMULSION: Performed by: ANESTHESIOLOGY

## 2022-05-31 PROCEDURE — 88342 IMHCHEM/IMCYTCHM 1ST ANTB: CPT

## 2022-05-31 RX ORDER — LIDOCAINE HYDROCHLORIDE 20 MG/ML
INJECTION, SOLUTION INFILTRATION; PERINEURAL AS NEEDED
Status: DISCONTINUED | OUTPATIENT
Start: 2022-05-31 | End: 2022-05-31 | Stop reason: SURG

## 2022-05-31 RX ORDER — SODIUM CHLORIDE 0.9 % (FLUSH) 0.9 %
10 SYRINGE (ML) INJECTION EVERY 12 HOURS SCHEDULED
Status: DISCONTINUED | OUTPATIENT
Start: 2022-05-31 | End: 2022-05-31 | Stop reason: HOSPADM

## 2022-05-31 RX ORDER — SODIUM CHLORIDE 0.9 % (FLUSH) 0.9 %
10 SYRINGE (ML) INJECTION AS NEEDED
Status: DISCONTINUED | OUTPATIENT
Start: 2022-05-31 | End: 2022-05-31 | Stop reason: HOSPADM

## 2022-05-31 RX ORDER — GLYCOPYRROLATE 0.2 MG/ML
INJECTION INTRAMUSCULAR; INTRAVENOUS AS NEEDED
Status: DISCONTINUED | OUTPATIENT
Start: 2022-05-31 | End: 2022-05-31 | Stop reason: SURG

## 2022-05-31 RX ORDER — SODIUM CHLORIDE, SODIUM LACTATE, POTASSIUM CHLORIDE, CALCIUM CHLORIDE 600; 310; 30; 20 MG/100ML; MG/100ML; MG/100ML; MG/100ML
30 INJECTION, SOLUTION INTRAVENOUS CONTINUOUS PRN
Status: DISCONTINUED | OUTPATIENT
Start: 2022-05-31 | End: 2022-05-31 | Stop reason: HOSPADM

## 2022-05-31 RX ORDER — PROPOFOL 10 MG/ML
VIAL (ML) INTRAVENOUS CONTINUOUS PRN
Status: DISCONTINUED | OUTPATIENT
Start: 2022-05-31 | End: 2022-05-31 | Stop reason: SURG

## 2022-05-31 RX ADMIN — PROPOFOL 200 MCG/KG/MIN: 10 INJECTION, EMULSION INTRAVENOUS at 12:40

## 2022-05-31 RX ADMIN — SODIUM CHLORIDE, POTASSIUM CHLORIDE, SODIUM LACTATE AND CALCIUM CHLORIDE 30 ML/HR: 600; 310; 30; 20 INJECTION, SOLUTION INTRAVENOUS at 11:49

## 2022-05-31 RX ADMIN — LIDOCAINE HYDROCHLORIDE 60 MG: 20 INJECTION, SOLUTION INFILTRATION; PERINEURAL at 12:40

## 2022-05-31 RX ADMIN — GLYCOPYRROLATE 0.2 MG: 0.2 INJECTION INTRAMUSCULAR; INTRAVENOUS at 12:40

## 2022-05-31 NOTE — ANESTHESIA PREPROCEDURE EVALUATION
Anesthesia Evaluation     Patient summary reviewed and Nursing notes reviewed                Airway   Mallampati: I  TM distance: >3 FB  Neck ROM: full  No difficulty expected  Dental - normal exam     Pulmonary - normal exam   (+) asthma,sleep apnea,   Cardiovascular - normal exam    (+) hypertension, hyperlipidemia,       Neuro/Psych  (+) psychiatric history,    GI/Hepatic/Renal/Endo    (+) obesity, morbid obesity, GERD,  renal disease, thyroid problem hypothyroidism    Musculoskeletal (-) negative ROS    Abdominal  - normal exam    Bowel sounds: normal.   Substance History - negative use     OB/GYN negative ob/gyn ROS         Other                        Anesthesia Plan    ASA 3     MAC       Anesthetic plan, all risks, benefits, and alternatives have been provided, discussed and informed consent has been obtained with: patient.        CODE STATUS:

## 2022-05-31 NOTE — ANESTHESIA POSTPROCEDURE EVALUATION
"Patient: Dick Galindo    Procedure Summary     Date: 05/31/22 Room / Location: [facility] ENDOSCOPY 5 / [facility] ENDOSCOPY    Anesthesia Start: 1238 Anesthesia Stop: 1314    Procedures:       ESOPHAGOGASTRODUODENOSCOPY with biopsies (N/A Esophagus)      COLONOSCOPY to cecum and TI with biopsies and cold forcep polypectomies (N/A ) Diagnosis:       Gastroesophageal reflux disease without esophagitis      Polyp of colon, unspecified part of colon, unspecified type      Castellon's esophagus with dysplasia      Iron deficiency anemia due to chronic blood loss      Heme positive stool      (Gastroesophageal reflux disease without esophagitis [K21.9])      (Polyp of colon, unspecified part of colon, unspecified type [K63.5])      (Castellon's esophagus with dysplasia [K22.719])      (Iron deficiency anemia due to chronic blood loss [D50.0])      (Heme positive stool [R19.5])    Surgeons: Usman Roper MD Provider: Aydin Anne MD    Anesthesia Type: MAC ASA Status: 3          Anesthesia Type: MAC    Vitals  Vitals Value Taken Time   /80 05/31/22 1337   Temp     Pulse 63 05/31/22 1337   Resp 12 05/31/22 1337   SpO2 97 % 05/31/22 1337           Post Anesthesia Care and Evaluation    Patient location during evaluation: PACU  Patient participation: complete - patient participated  Level of consciousness: awake  Pain score: 0  Pain management: adequate  Airway patency: patent  Anesthetic complications: No anesthetic complications  PONV Status: none  Cardiovascular status: acceptable  Respiratory status: acceptable  Hydration status: acceptable    Comments: /80 (BP Location: Left arm, Patient Position: Lying)   Pulse 63   Resp 12   Ht 167.6 cm (66\")   Wt 102 kg (224 lb 12.8 oz)   SpO2 97%   BMI 36.28 kg/m²       "

## 2022-06-01 RX ORDER — LISINOPRIL 40 MG/1
40 TABLET ORAL DAILY
Qty: 90 TABLET | Refills: 0 | Status: SHIPPED | OUTPATIENT
Start: 2022-06-01 | End: 2022-08-31 | Stop reason: SDUPTHER

## 2022-06-01 RX ORDER — ATORVASTATIN CALCIUM 40 MG/1
40 TABLET, FILM COATED ORAL DAILY
Qty: 90 TABLET | Refills: 0 | Status: SHIPPED | OUTPATIENT
Start: 2022-06-01 | End: 2022-08-31 | Stop reason: SDUPTHER

## 2022-06-02 LAB
ALBUMIN SERPL-MCNC: 4.2 G/DL (ref 3.7–4.7)
ALBUMIN/GLOB SERPL: 1.6 {RATIO} (ref 1.2–2.2)
ALP SERPL-CCNC: 85 IU/L (ref 44–121)
ALT SERPL-CCNC: 22 IU/L (ref 0–44)
AST SERPL-CCNC: 21 IU/L (ref 0–40)
BASOPHILS # BLD AUTO: 0.1 X10E3/UL (ref 0–0.2)
BASOPHILS NFR BLD AUTO: 1 %
BILIRUB SERPL-MCNC: 0.5 MG/DL (ref 0–1.2)
BUN SERPL-MCNC: 22 MG/DL (ref 8–27)
BUN/CREAT SERPL: 19 (ref 10–24)
CALCIUM SERPL-MCNC: 9.2 MG/DL (ref 8.6–10.2)
CHLORIDE SERPL-SCNC: 101 MMOL/L (ref 96–106)
CHOLEST SERPL-MCNC: 139 MG/DL (ref 100–199)
CO2 SERPL-SCNC: 22 MMOL/L (ref 20–29)
CREAT SERPL-MCNC: 1.13 MG/DL (ref 0.76–1.27)
EGFRCR SERPLBLD CKD-EPI 2021: 68 ML/MIN/1.73
EOSINOPHIL # BLD AUTO: 0.1 X10E3/UL (ref 0–0.4)
EOSINOPHIL NFR BLD AUTO: 2 %
ERYTHROCYTE [DISTWIDTH] IN BLOOD BY AUTOMATED COUNT: 14.9 % (ref 11.6–15.4)
GLOBULIN SER CALC-MCNC: 2.7 G/DL (ref 1.5–4.5)
GLUCOSE SERPL-MCNC: 100 MG/DL (ref 65–99)
HCT VFR BLD AUTO: 41.9 % (ref 37.5–51)
HDLC SERPL-MCNC: 52 MG/DL
HGB BLD-MCNC: 13.3 G/DL (ref 13–17.7)
IMM GRANULOCYTES # BLD AUTO: 0 X10E3/UL (ref 0–0.1)
IMM GRANULOCYTES NFR BLD AUTO: 0 %
LDLC SERPL CALC-MCNC: 72 MG/DL (ref 0–99)
LYMPHOCYTES # BLD AUTO: 1.6 X10E3/UL (ref 0.7–3.1)
LYMPHOCYTES NFR BLD AUTO: 22 %
MCH RBC QN AUTO: 27.7 PG (ref 26.6–33)
MCHC RBC AUTO-ENTMCNC: 31.7 G/DL (ref 31.5–35.7)
MCV RBC AUTO: 87 FL (ref 79–97)
MONOCYTES # BLD AUTO: 0.7 X10E3/UL (ref 0.1–0.9)
MONOCYTES NFR BLD AUTO: 10 %
NEUTROPHILS # BLD AUTO: 4.8 X10E3/UL (ref 1.4–7)
NEUTROPHILS NFR BLD AUTO: 65 %
PLATELET # BLD AUTO: 242 X10E3/UL (ref 150–450)
POTASSIUM SERPL-SCNC: 4.3 MMOL/L (ref 3.5–5.2)
PROT SERPL-MCNC: 6.9 G/DL (ref 6–8.5)
RBC # BLD AUTO: 4.81 X10E6/UL (ref 4.14–5.8)
SODIUM SERPL-SCNC: 137 MMOL/L (ref 134–144)
TRIGL SERPL-MCNC: 75 MG/DL (ref 0–149)
TSH SERPL DL<=0.005 MIU/L-ACNC: 2.94 UIU/ML (ref 0.45–4.5)
VLDLC SERPL CALC-MCNC: 15 MG/DL (ref 5–40)
WBC # BLD AUTO: 7.3 X10E3/UL (ref 3.4–10.8)

## 2022-06-03 LAB
LAB AP CASE REPORT: NORMAL
LAB AP CLINICAL INFORMATION: NORMAL
PATH REPORT.ADDENDUM SPEC: NORMAL
PATH REPORT.FINAL DX SPEC: NORMAL
PATH REPORT.GROSS SPEC: NORMAL

## 2022-06-07 ENCOUNTER — OFFICE VISIT (OUTPATIENT)
Dept: FAMILY MEDICINE CLINIC | Facility: CLINIC | Age: 77
End: 2022-06-07

## 2022-06-07 VITALS
BODY MASS INDEX: 37.17 KG/M2 | HEIGHT: 66 IN | DIASTOLIC BLOOD PRESSURE: 75 MMHG | TEMPERATURE: 98 F | HEART RATE: 97 BPM | SYSTOLIC BLOOD PRESSURE: 129 MMHG | WEIGHT: 231.3 LBS

## 2022-06-07 DIAGNOSIS — D50.9 IRON DEFICIENCY ANEMIA, UNSPECIFIED IRON DEFICIENCY ANEMIA TYPE: Chronic | ICD-10-CM

## 2022-06-07 DIAGNOSIS — E78.01 FAMILIAL HYPERCHOLESTEROLEMIA: Chronic | ICD-10-CM

## 2022-06-07 DIAGNOSIS — I10 ESSENTIAL HYPERTENSION: Primary | Chronic | ICD-10-CM

## 2022-06-07 DIAGNOSIS — F33.42 RECURRENT MAJOR DEPRESSIVE DISORDER, IN FULL REMISSION: Chronic | ICD-10-CM

## 2022-06-07 DIAGNOSIS — E03.8 SUBCLINICAL HYPOTHYROIDISM: Chronic | ICD-10-CM

## 2022-06-07 DIAGNOSIS — K22.719 BARRETT'S ESOPHAGUS WITH DYSPLASIA: Chronic | ICD-10-CM

## 2022-06-07 PROCEDURE — 99214 OFFICE O/P EST MOD 30 MIN: CPT | Performed by: FAMILY MEDICINE

## 2022-06-07 NOTE — PROGRESS NOTES
"Chief Complaint  Follow-up    Subjective        Dick Galindo presents to [facility]  History of Present Illness     Follow iron deficiency anemia.  He continues to follow with both GI and hematology.  His ferritin levels came up to normal but still low normal last month.  His hemoglobin is now normal.  He recently had a upper GI done which revealed some polyps, biopsy appears benign.  Final biopsy report pending.  He had Castellon's esophagitis without dysplasia.  He had some colon polyps that were reportedly benign.  He had a polyp stalk that was biopsied that was reportedly benign.  He final pathology report is pending.  They sent for CMV stains.  But no definite cause of anemia found.  He does have hemorrhoids.  He states he is not currently taking his iron supplementation.    Hypertension.  Patient continues amlodipine, carvedilol, hydrochlorothiazide and lisinopril.  Metabolic panel overall favorable.    Hyperlipidemia.  Patient continues atorvastatin 40 mg a day.  Lipid panel is overall favorable.    Major depression.  Mostly remission.  He continues on sertraline 50 mg a day maintenance therapy.    Hypothyroidism.  Patient continues levothyroxine 75 mcg a day.  TSH therapeutic.    Objective   Vital Signs:  /75   Pulse 97   Temp 98 °F (36.7 °C)   Ht 167.6 cm (66\")   Wt 105 kg (231 lb 4.8 oz)   BMI 37.33 kg/m²             Physical Exam  Vitals and nursing note reviewed.   Constitutional:       General: He is not in acute distress.     Appearance: He is well-developed. He is obese.   Cardiovascular:      Rate and Rhythm: Normal rate and regular rhythm.      Heart sounds: Normal heart sounds.   Pulmonary:      Effort: Pulmonary effort is normal.      Breath sounds: Normal breath sounds.   Musculoskeletal:      Cervical back: Normal range of motion.   Skin:     General: Skin is warm and dry.   Psychiatric:         Mood and Affect: Mood normal.        Result Review :  The " following data was reviewed by: Mynor Russ MD on 06/07/2022:  Common labs    Common Labsle 4/14/22 5/16/22 6/1/22 6/1/22 6/1/22      0948 0948 0948   Glucose    100 (A)    BUN    22    Creatinine    1.13    Sodium    137    Potassium    4.3    Chloride    101    Calcium    9.2    Total Protein    6.9    Albumin    4.2    Total Bilirubin    0.5    Alkaline Phosphatase    85    AST (SGOT)    21    ALT (SGPT)    22    WBC 5.80 6.9 7.3     Hemoglobin 12.1 (A) 13.1 13.3     Hematocrit 38.7 41.4 41.9     Platelets 237 301 242     Total Cholesterol     139   Triglycerides     75   HDL Cholesterol     52   LDL Cholesterol      72   (A) Abnormal value            This SmartLink has not been configured with any valid records.     Lab Results   Component Value Date    TSH 2.940 06/01/2022                 Assessment and Plan   Diagnoses and all orders for this visit:    1. Essential hypertension (Primary)    2. Familial hypercholesterolemia  -     Comprehensive Metabolic Panel; Future  -     Lipid Panel; Future    3. Subclinical hypothyroidism  -     TSH Rfx On Abnormal To Free T4; Future    4. Castellon's esophagus with dysplasia  -     CBC & Differential; Future    5. Iron deficiency anemia, unspecified iron deficiency anemia type  -     CBC & Differential; Future      Hypertension.  Well-controlled.    Iron deficiency anemia.  The ferritin is essentially normalized.  Still low normal however.  He is not taking iron supplementation at this time.  The EGD and colonoscopy at this point have not revealed any significant source of bleeding, although final biopsy reports are pending.  He is going to follow-up with his hematologist as directed soon.  I will see him back in 6 months for Medicare wellness visit.  Sooner as needed.    Hypothyroidism.  Subclinical.  TSH therapeutic.    Castellon's esophagitis with previous dysplasia, per recent EGD pulmonary biopsy report reveals no dysplasia.  He will continue to follow with his  gastroenterologist.  He continues lansoprazole.    Hyperlipidemia.  Continues atorvastatin 40 mg a day.    Major depression.  In remission.  Continue sertraline 50 mg daily.  SSRI use can be associated with GI bleed, however recent endoscopy revealed no ulcer.           Follow Up   No follow-ups on file.  Patient was given instructions and counseling regarding his condition or for health maintenance advice. Please see specific information pulled into the AVS if appropriate.

## 2022-06-09 ENCOUNTER — LAB (OUTPATIENT)
Dept: OTHER | Facility: HOSPITAL | Age: 77
End: 2022-06-09

## 2022-06-09 ENCOUNTER — OFFICE VISIT (OUTPATIENT)
Dept: ONCOLOGY | Facility: CLINIC | Age: 77
End: 2022-06-09

## 2022-06-09 VITALS
BODY MASS INDEX: 37.04 KG/M2 | HEIGHT: 66 IN | DIASTOLIC BLOOD PRESSURE: 71 MMHG | HEART RATE: 58 BPM | RESPIRATION RATE: 16 BRPM | TEMPERATURE: 97.3 F | OXYGEN SATURATION: 94 % | SYSTOLIC BLOOD PRESSURE: 123 MMHG | WEIGHT: 230.5 LBS

## 2022-06-09 DIAGNOSIS — D50.0 IRON DEFICIENCY ANEMIA DUE TO CHRONIC BLOOD LOSS: Primary | ICD-10-CM

## 2022-06-09 LAB
BASOPHILS # BLD AUTO: 0.1 10*3/MM3 (ref 0–0.2)
BASOPHILS NFR BLD AUTO: 1.4 % (ref 0–1.5)
DEPRECATED RDW RBC AUTO: 50.1 FL (ref 37–54)
EOSINOPHIL # BLD AUTO: 0.22 10*3/MM3 (ref 0–0.4)
EOSINOPHIL NFR BLD AUTO: 3 % (ref 0.3–6.2)
ERYTHROCYTE [DISTWIDTH] IN BLOOD BY AUTOMATED COUNT: 15.6 % (ref 12.3–15.4)
FERRITIN SERPL-MCNC: 30 NG/ML (ref 30–400)
HCT VFR BLD AUTO: 39.1 % (ref 37.5–51)
HGB BLD-MCNC: 12.4 G/DL (ref 13–17.7)
IMM GRANULOCYTES # BLD AUTO: 0.02 10*3/MM3 (ref 0–0.05)
IMM GRANULOCYTES NFR BLD AUTO: 0.3 % (ref 0–0.5)
IRON 24H UR-MRATE: 257 MCG/DL (ref 59–158)
IRON SATN MFR SERPL: 71 % (ref 20–50)
LYMPHOCYTES # BLD AUTO: 1.57 10*3/MM3 (ref 0.7–3.1)
LYMPHOCYTES NFR BLD AUTO: 21.7 % (ref 19.6–45.3)
MCH RBC QN AUTO: 27.9 PG (ref 26.6–33)
MCHC RBC AUTO-ENTMCNC: 31.7 G/DL (ref 31.5–35.7)
MCV RBC AUTO: 88.1 FL (ref 79–97)
MONOCYTES # BLD AUTO: 0.76 10*3/MM3 (ref 0.1–0.9)
MONOCYTES NFR BLD AUTO: 10.5 % (ref 5–12)
NEUTROPHILS NFR BLD AUTO: 4.56 10*3/MM3 (ref 1.7–7)
NEUTROPHILS NFR BLD AUTO: 63.1 % (ref 42.7–76)
NRBC BLD AUTO-RTO: 0 /100 WBC (ref 0–0.2)
PLATELET # BLD AUTO: 250 10*3/MM3 (ref 140–450)
PMV BLD AUTO: 9 FL (ref 6–12)
RBC # BLD AUTO: 4.44 10*6/MM3 (ref 4.14–5.8)
TIBC SERPL-MCNC: 364 MCG/DL (ref 298–536)
TRANSFERRIN SERPL-MCNC: 244 MG/DL (ref 200–360)
WBC NRBC COR # BLD: 7.23 10*3/MM3 (ref 3.4–10.8)

## 2022-06-09 PROCEDURE — 83540 ASSAY OF IRON: CPT | Performed by: INTERNAL MEDICINE

## 2022-06-09 PROCEDURE — 99214 OFFICE O/P EST MOD 30 MIN: CPT | Performed by: INTERNAL MEDICINE

## 2022-06-09 PROCEDURE — 84466 ASSAY OF TRANSFERRIN: CPT | Performed by: INTERNAL MEDICINE

## 2022-06-09 PROCEDURE — 82728 ASSAY OF FERRITIN: CPT | Performed by: INTERNAL MEDICINE

## 2022-06-09 PROCEDURE — 85025 COMPLETE CBC W/AUTO DIFF WBC: CPT | Performed by: INTERNAL MEDICINE

## 2022-06-09 PROCEDURE — 36415 COLL VENOUS BLD VENIPUNCTURE: CPT

## 2022-06-09 NOTE — PROGRESS NOTES
Subjective   Dick Galindo is a 76 y.o. male.  Referred by Dr. Rodriguez for evaluation and management of anemia    History of Present Illness   Mr. Dubois is a 75-year-old  male with history of hypertension, hyperlipidemia, BPH status post TURP on 7/7/2020 presents for further evaluation of anemia.  He was scheduled for the TURP procedure on 7/7/2020 at which point a CBC was checked and his hemoglobin was noted to be low at 9.2.  This prompted further work-up with vitamin B12, folic acid, iron studies.  The vitamin B12 and folic acid levels were noted to be normal.  Iron studies showed a low ferritin consistent with iron deficiency.  He was then placed on oral iron which he has been taking for the past 2 weeks.  Today a repeat CBC shows an improvement in hemoglobin to 11.2.  He denies any lightheadedness, dizziness, chest pain, dyspnea, palpitations, lower extremity edema.  He reports having lost about 10 pounds over the past 2 weeks, he attributes this to changes in his diet as well as decreased appetite since the surgery.  He denies noting any blood in his stool any melena.  Denies any hematemesis.  He has been on Prevacid for a long time now for GERD.  His last colonoscopy was in 2018 on which there was diverticulosis, polyps and internal hemorrhoids noted.  EGD was also performed in 2018 on which mildly erythematous mucosa of the esophagus was noted but no other abnormalities.  He was scheduled for a colonoscopy in February 2020 by Dr. Roper however this has been delayed because of COVID and he plans to undergo colonoscopy soon.  6/3/2021 EGD-Z-line is irregular, 39 cm from the incisors.  Biopsied  Erythematous mucosa in the stomach biopsied.  No gross lesions in the entire examined duodenum.  Stomach biopsies consistent with scattered changes suggestive of mild reactive gastropathy.  GE junction biopsy with mild esophagitis and focal goblet cell metaplasia Castellon's esophagus.    Interval history  Patient  presents today for follow-up.  He restarted taking the oral iron.  He was taking it every other day.  Stopped before colonoscopy and EGD on 5/31/2021.  Subsequently resumed.  He has been taking the iron every other day.  He reports occasional hemorrhoidal bleed but no other GI blood losses.  Denies any diarrhea.  Otherwise feeling well.  EGD and colonoscopy performed 5/31/2022 without any evidence of bleed.  He did have several polyps which were all benign.        The following portions of the patient's history were reviewed and updated as appropriate: allergies, current medications, past family history, past medical history, past social history, past surgical history and problem list.    Past Medical History:   Diagnosis Date   • Anxiety    • Asthma     mild case, allergies   • Castellon esophagus    • Bronchitis    • Colon polyps    • Diverticulitis    • Enlarged prostate    • GERD (gastroesophageal reflux disease)    • H/O bladder infections    • H/O bronchitis    • High cholesterol    • History of anemia    • Hypertension    • Hypothyroidism    • Seasonal allergies    • Sleep apnea     CPAP   • Slow urinary stream         Past Surgical History:   Procedure Laterality Date   • COLONOSCOPY N/A approx 2012   • COLONOSCOPY N/A 02/06/2018    Procedure: COLONOSCOPY INTO CEECUM AND TERMINAL MILEUM WITH COLD BIOPSY POLYPECTOMIES & COLD BIOIPSIES;  Surgeon: Usman Roper MD;  Location: [facility] ENDOSCOPY;  Service:    • COLONOSCOPY N/A 12/03/2020    Procedure: COLONOSCOPY TO CECUM AND INTO TI WITH COLD BIOPSIES AND COLD BIOPSY POLYPECTOMY;  Surgeon: Usman Roper MD;  Location: [facility] ENDOSCOPY;  Service: Gastroenterology;  Laterality: N/A;  pre: history of colon polyps  post: diverticulosis, polyp, and internal hemorrhoids   • COLONOSCOPY N/A 5/31/2022    Procedure: COLONOSCOPY to cecum and TI with biopsies and cold forcep polypectomies;  Surgeon: Usman Roper MD;  Location: [facility] ENDOSCOPY;  Service: Gastroenterology;   Laterality: N/A;  pre- hx of colon polyps, melena, iron deficiency anemia  post- diverticulosis, polyps, polyp stalk, internal hemorrhoids   • CYSTOSCOPY TRANSURETHRAL RESECTION OF PROSTATE N/A 2020    Procedure: TRANSURETHRAL RESECTION OF PROSTATE;  Surgeon: Charanjit Cameron MD;  Location: [facility] MAIN OR;  Service: Urology;  Laterality: N/A;   • ENDOSCOPY N/A 2018    Procedure: ESOPHAGOGASTRODUODENOSCOPY WITH COLD BIOPSIES;  Surgeon: Usman Roper MD;  Location: [facility] ENDOSCOPY;  Service:    • ENDOSCOPY N/A 2021    Procedure: ESOPHAGOGASTRODUODENOSCOPY WITH COLD BIOPSIES;  Surgeon: Usman Roper MD;  Location: [facility] ENDOSCOPY;  Service: Gastroenterology;  Laterality: N/A;  PRE:IRON DEFICIENCY ANEMIA  POST: GASTRITIS   • ENDOSCOPY N/A 2022    Procedure: ESOPHAGOGASTRODUODENOSCOPY with biopsies;  Surgeon: Usman Roper MD;  Location: [facility] ENDOSCOPY;  Service: Gastroenterology;  Laterality: N/A;  pre- Castellon's esophagus  post- gastritis, gastric polyps   • TONSILLECTOMY Bilateral    • UPPER GASTROINTESTINAL ENDOSCOPY          Family History   Problem Relation Age of Onset   • Stroke Father    • Heart attack Father    • No Known Problems Maternal Grandmother    • No Known Problems Maternal Grandfather    • No Known Problems Paternal Grandmother    • No Known Problems Paternal Grandfather    • Heart disease Brother    • Malig Hyperthermia Neg Hx    • Colon cancer Neg Hx    • Colon polyps Neg Hx         Social History     Socioeconomic History   • Marital status:    Tobacco Use   • Smoking status: Former Smoker     Packs/day: 2.00     Years: 14.00     Pack years: 28.00     Types: Cigarettes     Quit date: 1980     Years since quittin.4   • Smokeless tobacco: Never Used   • Tobacco comment: He smoked between the ages of 18 and 32.   Vaping Use   • Vaping Use: Never used   Substance and Sexual Activity   • Alcohol use: Yes     Alcohol/week: 4.0 standard drinks     Types:  4 Glasses of wine per week     Comment: occ.   • Drug use: No   • Sexual activity: Yes             Allergies   Allergen Reactions   • Amoxicillin Itching and Swelling   • Levaquin [Levofloxacin] Nausea And Vomiting            Review of Systems   Constitutional: Negative for activity change, appetite change, chills, diaphoresis, fatigue, fever, unexpected weight gain and unexpected weight loss.   HENT: Negative for congestion, dental problem, drooling, ear discharge, ear pain, facial swelling, hearing loss, mouth sores, nosebleeds, postnasal drip, rhinorrhea, sinus pressure, sneezing, sore throat, swollen glands, tinnitus, trouble swallowing and voice change.    Eyes: Negative for blurred vision, double vision, photophobia, pain, discharge, redness, itching and visual disturbance.   Respiratory: Negative for apnea, cough, choking, chest tightness, shortness of breath, wheezing and stridor.    Cardiovascular: Negative for chest pain, palpitations and leg swelling.   Gastrointestinal: Positive for GERD. Negative for abdominal distention, abdominal pain, anal bleeding, blood in stool, constipation, diarrhea, nausea, rectal pain, vomiting and indigestion.   Endocrine: Negative for cold intolerance, heat intolerance, polydipsia, polyphagia and polyuria.   Genitourinary: Negative for decreased urine volume, difficulty urinating, discharge, dysuria, flank pain, frequency, genital sores, hematuria, nocturia, erectile dysfunction and urgency.   Musculoskeletal: Negative for arthralgias, back pain, gait problem, joint swelling, myalgias, neck pain, neck stiffness and bursitis.   Skin: Negative for color change, dry skin, pallor, rash, skin lesions and wound.   Allergic/Immunologic: Negative for environmental allergies, food allergies and immunocompromised state.   Neurological: Negative for dizziness, tremors, seizures, syncope, facial asymmetry, speech difficulty, weakness, light-headedness, numbness, headache, memory problem  "and confusion.   Hematological: Negative for adenopathy. Does not bruise/bleed easily.   Psychiatric/Behavioral: Negative for agitation, behavioral problems, decreased concentration, dysphoric mood, hallucinations, self-injury, sleep disturbance, suicidal ideas, negative for hyperactivity, depressed mood and stress. The patient is not nervous/anxious.      Review of systems as mentioned in the HPI    Objective   Blood pressure 123/71, pulse 58, temperature 97.3 °F (36.3 °C), temperature source Temporal, resp. rate 16, height 167.6 cm (65.98\"), weight 105 kg (230 lb 8 oz), SpO2 94 %.   Physical Exam   Constitutional: He is oriented to person, place, and time. He appears well-developed and well-nourished. No distress.   HENT:   Head: Normocephalic and atraumatic.   Right Ear: External ear normal.   Left Ear: External ear normal.   Nose: Nose normal.   Mouth/Throat: Oropharynx is clear and moist. No oropharyngeal exudate.   Eyes: Pupils are equal, round, and reactive to light. Conjunctivae are normal. Right eye exhibits no discharge. Left eye exhibits no discharge. No scleral icterus.   Neck: No JVD present. No tracheal deviation present. No thyromegaly present.   Cardiovascular: Normal rate, regular rhythm and normal heart sounds. Exam reveals no gallop and no friction rub.   No murmur heard.  Pulmonary/Chest: Effort normal and breath sounds normal. No stridor. No respiratory distress. He has no wheezes. He has no rales. He exhibits no tenderness.   Abdominal: Soft. Bowel sounds are normal. He exhibits no distension and no mass. There is no abdominal tenderness. There is no rebound and no guarding.   Musculoskeletal: Normal range of motion. No tenderness or deformity.   Lymphadenopathy:     He has no cervical adenopathy.   Neurological: He is alert and oriented to person, place, and time. No cranial nerve deficit. Coordination normal.   Skin: Skin is warm and dry. No rash noted. He is not diaphoretic. No erythema. No " pallor.   Psychiatric: His behavior is normal. Judgment and thought content normal.   Vitals reviewed.    I have reexamined the patient and the results are consistent with the previously documented exam. Harika Hein MD     Lab on 06/09/2022   Component Date Value Ref Range Status   • Ferritin 06/09/2022 30.00  30.00 - 400.00 ng/mL Final   • Iron 06/09/2022 257 (A) 59 - 158 mcg/dL Final   • Iron Saturation 06/09/2022 71 (A) 20 - 50 % Final   • Transferrin 06/09/2022 244  200 - 360 mg/dL Final   • TIBC 06/09/2022 364  298 - 536 mcg/dL Final   • WBC 06/09/2022 7.23  3.40 - 10.80 10*3/mm3 Final   • RBC 06/09/2022 4.44  4.14 - 5.80 10*6/mm3 Final   • Hemoglobin 06/09/2022 12.4 (A) 13.0 - 17.7 g/dL Final   • Hematocrit 06/09/2022 39.1  37.5 - 51.0 % Final   • MCV 06/09/2022 88.1  79.0 - 97.0 fL Final   • MCH 06/09/2022 27.9  26.6 - 33.0 pg Final   • MCHC 06/09/2022 31.7  31.5 - 35.7 g/dL Final   • RDW 06/09/2022 15.6 (A) 12.3 - 15.4 % Final   • RDW-SD 06/09/2022 50.1  37.0 - 54.0 fl Final   • MPV 06/09/2022 9.0  6.0 - 12.0 fL Final   • Platelets 06/09/2022 250  140 - 450 10*3/mm3 Final   • Neutrophil % 06/09/2022 63.1  42.7 - 76.0 % Final   • Lymphocyte % 06/09/2022 21.7  19.6 - 45.3 % Final   • Monocyte % 06/09/2022 10.5  5.0 - 12.0 % Final   • Eosinophil % 06/09/2022 3.0  0.3 - 6.2 % Final   • Basophil % 06/09/2022 1.4  0.0 - 1.5 % Final   • Immature Grans % 06/09/2022 0.3  0.0 - 0.5 % Final   • Neutrophils, Absolute 06/09/2022 4.56  1.70 - 7.00 10*3/mm3 Final   • Lymphocytes, Absolute 06/09/2022 1.57  0.70 - 3.10 10*3/mm3 Final   • Monocytes, Absolute 06/09/2022 0.76  0.10 - 0.90 10*3/mm3 Final   • Eosinophils, Absolute 06/09/2022 0.22  0.00 - 0.40 10*3/mm3 Final   • Basophils, Absolute 06/09/2022 0.10  0.00 - 0.20 10*3/mm3 Final   • Immature Grans, Absolute 06/09/2022 0.02  0.00 - 0.05 10*3/mm3 Final   • nRBC 06/09/2022 0.0  0.0 - 0.2 /100 WBC Final   Orders Only on 06/01/2022   Component Date Value Ref Range  Status   • WBC 06/01/2022 7.3  3.4 - 10.8 x10E3/uL Final   • RBC 06/01/2022 4.81  4.14 - 5.80 x10E6/uL Final   • Hemoglobin 06/01/2022 13.3  13.0 - 17.7 g/dL Final   • Hematocrit 06/01/2022 41.9  37.5 - 51.0 % Final   • MCV 06/01/2022 87  79 - 97 fL Final   • MCH 06/01/2022 27.7  26.6 - 33.0 pg Final   • MCHC 06/01/2022 31.7  31.5 - 35.7 g/dL Final   • RDW 06/01/2022 14.9  11.6 - 15.4 % Final   • Platelets 06/01/2022 242  150 - 450 x10E3/uL Final   • Neutrophil Rel % 06/01/2022 65  Not Estab. % Final   • Lymphocyte Rel % 06/01/2022 22  Not Estab. % Final   • Monocyte Rel % 06/01/2022 10  Not Estab. % Final   • Eosinophil Rel % 06/01/2022 2  Not Estab. % Final   • Basophil Rel % 06/01/2022 1  Not Estab. % Final   • Neutrophils Absolute 06/01/2022 4.8  1.4 - 7.0 x10E3/uL Final   • Lymphocytes Absolute 06/01/2022 1.6  0.7 - 3.1 x10E3/uL Final   • Monocytes Absolute 06/01/2022 0.7  0.1 - 0.9 x10E3/uL Final   • Eosinophils Absolute 06/01/2022 0.1  0.0 - 0.4 x10E3/uL Final   • Basophils Absolute 06/01/2022 0.1  0.0 - 0.2 x10E3/uL Final   • Immature Granulocyte Rel % 06/01/2022 0  Not Estab. % Final   • Immature Grans Absolute 06/01/2022 0.0  0.0 - 0.1 x10E3/uL Final   • Glucose 06/01/2022 100 (A) 65 - 99 mg/dL Final   • BUN 06/01/2022 22  8 - 27 mg/dL Final   • Creatinine 06/01/2022 1.13  0.76 - 1.27 mg/dL Final   • EGFR Result 06/01/2022 68  >59 mL/min/1.73 Final   • BUN/Creatinine Ratio 06/01/2022 19  10 - 24 Final   • Sodium 06/01/2022 137  134 - 144 mmol/L Final   • Potassium 06/01/2022 4.3  3.5 - 5.2 mmol/L Final   • Chloride 06/01/2022 101  96 - 106 mmol/L Final   • Total CO2 06/01/2022 22  20 - 29 mmol/L Final   • Calcium 06/01/2022 9.2  8.6 - 10.2 mg/dL Final   • Total Protein 06/01/2022 6.9  6.0 - 8.5 g/dL Final   • Albumin 06/01/2022 4.2  3.7 - 4.7 g/dL Final   • Globulin 06/01/2022 2.7  1.5 - 4.5 g/dL Final   • A/G Ratio 06/01/2022 1.6  1.2 - 2.2 Final   • Total Bilirubin 06/01/2022 0.5  0.0 - 1.2 mg/dL Final    • Alkaline Phosphatase 06/01/2022 85  44 - 121 IU/L Final   • AST (SGOT) 06/01/2022 21  0 - 40 IU/L Final   • ALT (SGPT) 06/01/2022 22  0 - 44 IU/L Final   • Total Cholesterol 06/01/2022 139  100 - 199 mg/dL Final   • Triglycerides 06/01/2022 75  0 - 149 mg/dL Final   • HDL Cholesterol 06/01/2022 52  >39 mg/dL Final   • VLDL Cholesterol Danielito 06/01/2022 15  5 - 40 mg/dL Final   • LDL Chol Calc (NIH) 06/01/2022 72  0 - 99 mg/dL Final   • TSH 06/01/2022 2.940  0.450 - 4.500 uIU/mL Final   Admission on 05/31/2022, Discharged on 05/31/2022   Component Date Value Ref Range Status   • Addendum 05/31/2022    Final                    Value:This result contains rich text formatting which cannot be displayed here.   • Case Report 05/31/2022    Final                    Value:Surgical Pathology Report                         Case: [id number]                                  Authorizing Provider:  Usman Roper MD           Collected:           05/31/2022 12:45 PM          Ordering Location:     [facility]  Received:            05/31/2022 02:27 PM                                 ENDO SUITES                                                                  Pathologist:           Camron Esquivel MD                                                         Specimens:   1) - Small Intestine, Duodenum                                                                      2) - Gastric, Body, random gastric bx                                                               3) - Esophagus, Distal                                                                              4) - Large Intestine, Right / Ascending Colon                                                       5) - Large Intestine, polyp stalk at 40 cm                                                                                    6) - Large Intestine, polyp at 35 cm                                                      • Clinical Information 05/31/2022    Final                     Value:This result contains rich text formatting which cannot be displayed here.   • Final Diagnosis 05/31/2022    Final                    Value:This result contains rich text formatting which cannot be displayed here.   • Gross Description 05/31/2022    Final                    Value:This result contains rich text formatting which cannot be displayed here.   Admission on 05/29/2022, Discharged on 05/29/2022   Component Date Value Ref Range Status   • SARS-CoV-2, CHIARA 05/29/2022 Not Detected  Not Detected Final    Comment: This nucleic acid amplification test was developed and its performance  characteristics determined by [facility]. Nucleic acid  amplification tests include RT-PCR and TMA. This test has not been  FDA cleared or approved. This test has been authorized by FDA under  an Emergency Use Authorization (EUA). This test is only authorized  for the duration of time the declaration that circumstances exist  justifying the authorization of the emergency use of in vitro  diagnostic tests for detection of SARS-CoV-2 virus and/or diagnosis  of COVID-19 infection under section 564(b)(1) of the Act, 21 U.S.C.  360bbb-3(b) (1), unless the authorization is terminated or revoked  sooner.  When diagnostic testing is negative, the possibility of a false  negative result should be considered in the context of a patient's  recent exposures and the presence of clinical signs and symptoms  consistent with COVID-19. An individual without symptoms of COVID-19  and who is not shedding SARS-CoV-2 virus                            would expect to have a  negative (not detected) result in this assay.   • [facility] SARS-COV-2, CHIRAA 2 DAY TAT 05/29/2022 Performed   Final   Office Visit on 05/16/2022   Component Date Value Ref Range Status   • WBC 05/16/2022 6.9  3.4 - 10.8 x10E3/uL Final   • RBC 05/16/2022 4.79  4.14 - 5.80 x10E6/uL Final   • Hemoglobin 05/16/2022 13.1  13.0 - 17.7 g/dL Final   • Hematocrit  05/16/2022 41.4  37.5 - 51.0 % Final   • MCV 05/16/2022 86  79 - 97 fL Final   • MCH 05/16/2022 27.3  26.6 - 33.0 pg Final   • MCHC 05/16/2022 31.6  31.5 - 35.7 g/dL Final   • RDW 05/16/2022 14.4  11.6 - 15.4 % Final   • Platelets 05/16/2022 301  150 - 450 x10E3/uL Final   • Neutrophil Rel % 05/16/2022 63  Not Estab. % Final   • Lymphocyte Rel % 05/16/2022 24  Not Estab. % Final   • Monocyte Rel % 05/16/2022 9  Not Estab. % Final   • Eosinophil Rel % 05/16/2022 3  Not Estab. % Final   • Basophil Rel % 05/16/2022 1  Not Estab. % Final   • Neutrophils Absolute 05/16/2022 4.4  1.4 - 7.0 x10E3/uL Final   • Lymphocytes Absolute 05/16/2022 1.7  0.7 - 3.1 x10E3/uL Final   • Monocytes Absolute 05/16/2022 0.6  0.1 - 0.9 x10E3/uL Final   • Eosinophils Absolute 05/16/2022 0.2  0.0 - 0.4 x10E3/uL Final   • Basophils Absolute 05/16/2022 0.1  0.0 - 0.2 x10E3/uL Final   • Immature Granulocyte Rel % 05/16/2022 0  Not Estab. % Final   • Immature Grans Absolute 05/16/2022 0.0  0.0 - 0.1 x10E3/uL Final   • Ferritin 05/16/2022 32  30 - 400 ng/mL Final   • TIBC 05/16/2022 303  250 - 450 ug/dL Final   • UIBC 05/16/2022 253  111 - 343 ug/dL Final   • Iron 05/16/2022 50  38 - 169 ug/dL Final   • Iron Saturation 05/16/2022 17  15 - 55 % Final        No radiology results for the last 30 days.     4/14/2022  CBC-WBC 5.8, hemoglobin 12.1 which is a decrease from 14 4 months ago, platelets 227  Ferritin low at 15.7  Iron studies show low iron saturation 10%, transferrin 282, TIBC 420    CBC with a hemoglobin of 12.4 otherwise within normal limits  Ferritin 30  Iron saturation 71, TIBC 364      Assessment & Plan      Mr. Holder is a 76-year-old  male referred for further work-up of anemia.  On reviewing the labs he is noted to have low hemoglobin since May 2019.  Ferritin was normal in the past however most recent ferritin from 7/8/2020 noted to be low at 12.  This is indicative of iron deficiency.  He has been on oral iron and has had  a good response.    1.anemia  Work-up consistent with iron deficiency   Had a colonoscopy in December 2020 which does not show any evidence of bleeding  EGD June 2021 with esophagitis and Castellon's esophagus.  He had some hemorrhoidal bleeding with stopped about 3 months ago.  He continues on oral iron every other day.  Hemoglobin today is normal at 14.1.  Ferritin normal at 36.1, iron profile shows an iron saturation of 21% and TIBC 337  Has not been on oral iron for about 4 to 5 months now.  Hemoglobin decreased to 12.1 and therefore oral iron resumed.  Repeat EGD and C-scope 5/31/2022 without any evidence of bleeding  He will likely need a capsule study.  Continue oral iron.  Recommend taking 2 daily.    2.hypertension-blood pressure well controlled, continue current medications, blood pressure 123/71    3.GI work-up for iron deficiency anemia-with C-scope and EGD performed 5/31/2022 negative    4.hyperlipidemia , continue atorvastatin, stable    5.mood-continue Zoloft, stable    6.obesity-BMI 37.2.      7.follow-up-2 months with CBC and iron studies

## 2022-06-26 NOTE — PROGRESS NOTES
06/26/22       Tell him that pathology from his recent EGD did show evidence of Castellon's esophagus with no dysplasia (which he already knows that he has).       the colon polyps that were removed were not cancerous but were precancerous.  I would recommend that he have a repeat colonoscopy in 3 to 5 years.       please send copy of this report to his PCP.  Julia hartmann

## 2022-06-28 ENCOUNTER — TELEPHONE (OUTPATIENT)
Dept: GASTROENTEROLOGY | Facility: CLINIC | Age: 77
End: 2022-06-28

## 2022-06-28 NOTE — TELEPHONE ENCOUNTER
----- Message from Usman Roper MD sent at 6/26/2022  1:43 PM EDT -----  06/26/22       Tell him that pathology from his recent EGD did show evidence of Castellon's esophagus with no dysplasia (which he already knows that he has).       the colon polyps that were removed were not cancerous but were precancerous.  I would recommend that he have a repeat colonoscopy in 3 to 5 years.       please send copy of this report to his PCP.  Ram[place]. [email or web address]

## 2022-06-28 NOTE — TELEPHONE ENCOUNTER
Patient notified of results and recommendations and verbalized understanding      Hm and cs recall  Placed for 5/31/25  Result note routed to PCP

## 2022-08-09 NOTE — PROGRESS NOTES
Subjective   Dick Galindo is a 77 y.o. male.  Referred by Dr. Rodriguez for evaluation and management of anemia    History of Present Illness   Mr. Dubois is a 75-year-old  male with history of hypertension, hyperlipidemia, BPH status post TURP on 7/7/2020 presents for further evaluation of anemia.  He was scheduled for the TURP procedure on 7/7/2020 at which point a CBC was checked and his hemoglobin was noted to be low at 9.2.  This prompted further work-up with vitamin B12, folic acid, iron studies.  The vitamin B12 and folic acid levels were noted to be normal.  Iron studies showed a low ferritin consistent with iron deficiency.  He was then placed on oral iron which he has been taking for the past 2 weeks.  Today a repeat CBC shows an improvement in hemoglobin to 11.2.  He denies any lightheadedness, dizziness, chest pain, dyspnea, palpitations, lower extremity edema.  He reports having lost about 10 pounds over the past 2 weeks, he attributes this to changes in his diet as well as decreased appetite since the surgery.  He denies noting any blood in his stool any melena.  Denies any hematemesis.  He has been on Prevacid for a long time now for GERD.  His last colonoscopy was in 2018 on which there was diverticulosis, polyps and internal hemorrhoids noted.  EGD was also performed in 2018 on which mildly erythematous mucosa of the esophagus was noted but no other abnormalities.  He was scheduled for a colonoscopy in February 2020 by Dr. Roper however this has been delayed because of COVID and he plans to undergo colonoscopy soon.  6/3/2021 EGD-Z-line is irregular, 39 cm from the incisors.  Biopsied  Erythematous mucosa in the stomach biopsied.  No gross lesions in the entire examined duodenum.  Stomach biopsies consistent with scattered changes suggestive of mild reactive gastropathy.  GE junction biopsy with mild esophagitis and focal goblet cell metaplasia Castellon's esophagus.    Interval history  The  patient returns today for lab review and follow-up.  He continues on oral iron twice daily, tolerating this well.  Eating and drinking adequately.  Bowels moving regularly.  Denies fever or chills.  Denies nausea or vomiting.  Denies dizziness, lightheadedness, or shortness of breath.  Denies any obvious signs of bleeding.  No new concerns today.    Scheduled for follow-up with gastroenterology on 11/16/2022.      The following portions of the patient's history were reviewed and updated as appropriate: allergies, current medications, past family history, past medical history, past social history, past surgical history and problem list.    Past Medical History:   Diagnosis Date   • Anxiety    • Asthma     mild case, allergies   • Castellon esophagus    • Bronchitis    • Colon polyps    • Diverticulitis    • Enlarged prostate    • GERD (gastroesophageal reflux disease)    • H/O bladder infections    • H/O bronchitis    • High cholesterol    • History of anemia    • Hypertension    • Hypothyroidism    • Seasonal allergies    • Sleep apnea     CPAP   • Slow urinary stream         Past Surgical History:   Procedure Laterality Date   • COLONOSCOPY N/A approx 2012   • COLONOSCOPY N/A 02/06/2018    Procedure: COLONOSCOPY INTO CEECUM AND TERMINAL MILEUM WITH COLD BIOPSY POLYPECTOMIES & COLD BIOIPSIES;  Surgeon: Usman Roper MD;  Location: [facility] ENDOSCOPY;  Service:    • COLONOSCOPY N/A 12/03/2020    Procedure: COLONOSCOPY TO CECUM AND INTO TI WITH COLD BIOPSIES AND COLD BIOPSY POLYPECTOMY;  Surgeon: Usman Roper MD;  Location: [facility] ENDOSCOPY;  Service: Gastroenterology;  Laterality: N/A;  pre: history of colon polyps  post: diverticulosis, polyp, and internal hemorrhoids   • COLONOSCOPY N/A 5/31/2022    Procedure: COLONOSCOPY to cecum and TI with biopsies and cold forcep polypectomies;  Surgeon: Usman Roper MD;  Location: [facility] ENDOSCOPY;  Service: Gastroenterology;  Laterality: N/A;  pre- hx of colon polyps, melena,  iron deficiency anemia  post- diverticulosis, polyps, polyp stalk, internal hemorrhoids   • CYSTOSCOPY TRANSURETHRAL RESECTION OF PROSTATE N/A 2020    Procedure: TRANSURETHRAL RESECTION OF PROSTATE;  Surgeon: Charanjit Cameron MD;  Location: [facility] MAIN OR;  Service: Urology;  Laterality: N/A;   • ENDOSCOPY N/A 2018    Procedure: ESOPHAGOGASTRODUODENOSCOPY WITH COLD BIOPSIES;  Surgeon: Usman Roper MD;  Location: [facility] ENDOSCOPY;  Service:    • ENDOSCOPY N/A 2021    Procedure: ESOPHAGOGASTRODUODENOSCOPY WITH COLD BIOPSIES;  Surgeon: Usman Roper MD;  Location: [facility] ENDOSCOPY;  Service: Gastroenterology;  Laterality: N/A;  PRE:IRON DEFICIENCY ANEMIA  POST: GASTRITIS   • ENDOSCOPY N/A 2022    Procedure: ESOPHAGOGASTRODUODENOSCOPY with biopsies;  Surgeon: Usman Roper MD;  Location: [facility] ENDOSCOPY;  Service: Gastroenterology;  Laterality: N/A;  pre- Castellon's esophagus  post- gastritis, gastric polyps   • TONSILLECTOMY Bilateral    • UPPER GASTROINTESTINAL ENDOSCOPY          Family History   Problem Relation Age of Onset   • Stroke Father    • Heart attack Father    • No Known Problems Maternal Grandmother    • No Known Problems Maternal Grandfather    • No Known Problems Paternal Grandmother    • No Known Problems Paternal Grandfather    • Heart disease Brother    • Malig Hyperthermia Neg Hx    • Colon cancer Neg Hx    • Colon polyps Neg Hx         Social History     Socioeconomic History   • Marital status:    Tobacco Use   • Smoking status: Former Smoker     Packs/day: 2.00     Years: 14.00     Pack years: 28.00     Types: Cigarettes     Quit date: 1980     Years since quittin.6   • Smokeless tobacco: Never Used   • Tobacco comment: He smoked between the ages of 18 and 32.   Vaping Use   • Vaping Use: Never used   Substance and Sexual Activity   • Alcohol use: Yes     Alcohol/week: 4.0 standard drinks     Types: 4 Glasses of wine per week     Comment: occ.   •  "Drug use: No   • Sexual activity: Yes             Allergies   Allergen Reactions   • Amoxicillin Itching and Swelling   • Levaquin [Levofloxacin] Nausea And Vomiting      Review of systems as mentioned in the HPI    Objective   Blood pressure 143/75, pulse 59, temperature 97.1 °F (36.2 °C), temperature source Temporal, resp. rate 18, height 167.6 cm (65.98\"), weight 106 kg (234 lb 4.8 oz), SpO2 95 %.     Physical Exam   Constitutional: He is oriented to person, place, and time. He appears well-developed and well-nourished. He does not appear ill. No distress.   HENT:   Head: Normocephalic.   Nose: Nose normal.   Mouth/Throat: Oropharynx is clear and moist. No oropharyngeal exudate.   Eyes: Pupils are equal, round, and reactive to light. Conjunctivae are normal.   Neck: No JVD present. No tracheal deviation present. No thyromegaly present.   Cardiovascular: Normal rate, regular rhythm and normal heart sounds.   Pulmonary/Chest: Effort normal and breath sounds normal.   Abdominal: Soft. Bowel sounds are normal.   Musculoskeletal: Normal range of motion.   Neurological: He is alert and oriented to person, place, and time. He displays no weakness.   Skin: Skin is warm and dry. No rash noted.   Psychiatric: His behavior is normal. Mood, judgment and thought content normal.   Vitals reviewed.    I have reexamined the patient and the results are consistent with the previously documented exam. JEFF Mayers     Lab on 08/11/2022   Component Date Value Ref Range Status   • Ferritin 08/11/2022 31.70  30.00 - 400.00 ng/mL Final   • Iron 08/11/2022 96  59 - 158 mcg/dL Final   • Iron Saturation 08/11/2022 27  20 - 50 % Final   • Transferrin 08/11/2022 235  200 - 360 mg/dL Final   • TIBC 08/11/2022 350  298 - 536 mcg/dL Final   • WBC 08/11/2022 5.92  3.40 - 10.80 10*3/mm3 Final   • RBC 08/11/2022 4.67  4.14 - 5.80 10*6/mm3 Final   • Hemoglobin 08/11/2022 14.0  13.0 - 17.7 g/dL Final   • Hematocrit 08/11/2022 42.3  37.5 - " 51.0 % Final   • MCV 08/11/2022 90.6  79.0 - 97.0 fL Final   • MCH 08/11/2022 30.0  26.6 - 33.0 pg Final   • MCHC 08/11/2022 33.1  31.5 - 35.7 g/dL Final   • RDW 08/11/2022 14.0  12.3 - 15.4 % Final   • RDW-SD 08/11/2022 46.6  37.0 - 54.0 fl Final   • MPV 08/11/2022 8.9  6.0 - 12.0 fL Final   • Platelets 08/11/2022 206  140 - 450 10*3/mm3 Final   • Neutrophil % 08/11/2022 56.7  42.7 - 76.0 % Final   • Lymphocyte % 08/11/2022 30.9  19.6 - 45.3 % Final   • Monocyte % 08/11/2022 8.4  5.0 - 12.0 % Final   • Eosinophil % 08/11/2022 2.4  0.3 - 6.2 % Final   • Basophil % 08/11/2022 1.4  0.0 - 1.5 % Final   • Immature Grans % 08/11/2022 0.2  0.0 - 0.5 % Final   • Neutrophils, Absolute 08/11/2022 3.36  1.70 - 7.00 10*3/mm3 Final   • Lymphocytes, Absolute 08/11/2022 1.83  0.70 - 3.10 10*3/mm3 Final   • Monocytes, Absolute 08/11/2022 0.50  0.10 - 0.90 10*3/mm3 Final   • Eosinophils, Absolute 08/11/2022 0.14  0.00 - 0.40 10*3/mm3 Final   • Basophils, Absolute 08/11/2022 0.08  0.00 - 0.20 10*3/mm3 Final   • Immature Grans, Absolute 08/11/2022 0.01  0.00 - 0.05 10*3/mm3 Final   • nRBC 08/11/2022 0.0  0.0 - 0.2 /100 WBC Final        No radiology results for the last 30 days.     4/14/2022  CBC-WBC 5.8, hemoglobin 12.1 which is a decrease from 14 4 months ago, platelets 227  Ferritin low at 15.7  Iron studies show low iron saturation 10%, transferrin 282, TIBC 420    CBC with a hemoglobin of 12.4 otherwise within normal limits  Ferritin 30  Iron saturation 71, TIBC 364    Assessment & Plan      Mr. Holder is a 76-year-old  male referred for further work-up of anemia.  On reviewing the labs he is noted to have low hemoglobin since May 2019.  Ferritin was normal in the past however most recent ferritin from 7/8/2020 noted to be low at 12.  This is indicative of iron deficiency.  He has been on oral iron and has had a good response.    1.anemia  · Work-up consistent with iron deficiency   · Had a colonoscopy in December 2020  which does not show any evidence of bleeding  · EGD June 2021 with esophagitis and Castellon's esophagus.  · He had some hemorrhoidal bleeding with stopped about 3 months ago.  · He continues on oral iron every other day.  · Hemoglobin today is normal at 14.1.  · Ferritin normal at 36.1, iron profile shows an iron saturation of 21% and TIBC 337  · Has not been on oral iron for about 4 to 5 months now.  · Hemoglobin decreased to 12.1 and therefore oral iron resumed.  · Repeat EGD and C-scope 5/31/2022 without any evidence of bleeding  · He will likely need a capsule study.  · Continue oral iron.  Recommend taking 2 daily.  · 8/11/2022 he continues on oral iron twice daily.  Hemoglobin improved to 14.0, iron saturation 27, ferritin 31.7.  With improvement in hemoglobin, we will continue oral iron twice daily.    2.hypertension-blood pressure well controlled, continue current medications, blood pressure 143/75.    3.GI work-up for iron deficiency anemia-with C-scope and EGD performed 5/31/2022 negative.  · Scheduled for follow-up with GI 11/16/2022.     4.hyperlipidemia , continue atorvastatin, stable    5.mood-continue Zoloft, stable    6.obesity-BMI 37.2.      PLAN:   · Continue oral iron twice daily.  · Continue follow-up with GI, next scheduled 11/16/2022.  · Return in 3 months (After GI follow up) for MD follow-up with CBC, CMP, stat ferritin, stat iron profile.     JEFF Mayers  08/11/22

## 2022-08-11 ENCOUNTER — OFFICE VISIT (OUTPATIENT)
Dept: ONCOLOGY | Facility: CLINIC | Age: 77
End: 2022-08-11

## 2022-08-11 ENCOUNTER — APPOINTMENT (OUTPATIENT)
Dept: OTHER | Facility: HOSPITAL | Age: 77
End: 2022-08-11

## 2022-08-11 ENCOUNTER — LAB (OUTPATIENT)
Dept: OTHER | Facility: HOSPITAL | Age: 77
End: 2022-08-11

## 2022-08-11 VITALS
SYSTOLIC BLOOD PRESSURE: 143 MMHG | RESPIRATION RATE: 18 BRPM | OXYGEN SATURATION: 95 % | BODY MASS INDEX: 37.66 KG/M2 | WEIGHT: 234.3 LBS | HEIGHT: 66 IN | DIASTOLIC BLOOD PRESSURE: 75 MMHG | HEART RATE: 59 BPM | TEMPERATURE: 97.1 F

## 2022-08-11 DIAGNOSIS — D50.0 IRON DEFICIENCY ANEMIA DUE TO CHRONIC BLOOD LOSS: Primary | ICD-10-CM

## 2022-08-11 DIAGNOSIS — D50.0 IRON DEFICIENCY ANEMIA DUE TO CHRONIC BLOOD LOSS: ICD-10-CM

## 2022-08-11 LAB
BASOPHILS # BLD AUTO: 0.08 10*3/MM3 (ref 0–0.2)
BASOPHILS NFR BLD AUTO: 1.4 % (ref 0–1.5)
DEPRECATED RDW RBC AUTO: 46.6 FL (ref 37–54)
EOSINOPHIL # BLD AUTO: 0.14 10*3/MM3 (ref 0–0.4)
EOSINOPHIL NFR BLD AUTO: 2.4 % (ref 0.3–6.2)
ERYTHROCYTE [DISTWIDTH] IN BLOOD BY AUTOMATED COUNT: 14 % (ref 12.3–15.4)
FERRITIN SERPL-MCNC: 31.7 NG/ML (ref 30–400)
HCT VFR BLD AUTO: 42.3 % (ref 37.5–51)
HGB BLD-MCNC: 14 G/DL (ref 13–17.7)
IMM GRANULOCYTES # BLD AUTO: 0.01 10*3/MM3 (ref 0–0.05)
IMM GRANULOCYTES NFR BLD AUTO: 0.2 % (ref 0–0.5)
IRON 24H UR-MRATE: 96 MCG/DL (ref 59–158)
IRON SATN MFR SERPL: 27 % (ref 20–50)
LYMPHOCYTES # BLD AUTO: 1.83 10*3/MM3 (ref 0.7–3.1)
LYMPHOCYTES NFR BLD AUTO: 30.9 % (ref 19.6–45.3)
MCH RBC QN AUTO: 30 PG (ref 26.6–33)
MCHC RBC AUTO-ENTMCNC: 33.1 G/DL (ref 31.5–35.7)
MCV RBC AUTO: 90.6 FL (ref 79–97)
MONOCYTES # BLD AUTO: 0.5 10*3/MM3 (ref 0.1–0.9)
MONOCYTES NFR BLD AUTO: 8.4 % (ref 5–12)
NEUTROPHILS NFR BLD AUTO: 3.36 10*3/MM3 (ref 1.7–7)
NEUTROPHILS NFR BLD AUTO: 56.7 % (ref 42.7–76)
NRBC BLD AUTO-RTO: 0 /100 WBC (ref 0–0.2)
PLATELET # BLD AUTO: 206 10*3/MM3 (ref 140–450)
PMV BLD AUTO: 8.9 FL (ref 6–12)
RBC # BLD AUTO: 4.67 10*6/MM3 (ref 4.14–5.8)
TIBC SERPL-MCNC: 350 MCG/DL (ref 298–536)
TRANSFERRIN SERPL-MCNC: 235 MG/DL (ref 200–360)
WBC NRBC COR # BLD: 5.92 10*3/MM3 (ref 3.4–10.8)

## 2022-08-11 PROCEDURE — 85025 COMPLETE CBC W/AUTO DIFF WBC: CPT | Performed by: INTERNAL MEDICINE

## 2022-08-11 PROCEDURE — 82728 ASSAY OF FERRITIN: CPT | Performed by: INTERNAL MEDICINE

## 2022-08-11 PROCEDURE — 36415 COLL VENOUS BLD VENIPUNCTURE: CPT

## 2022-08-11 PROCEDURE — 84466 ASSAY OF TRANSFERRIN: CPT | Performed by: INTERNAL MEDICINE

## 2022-08-11 PROCEDURE — 99213 OFFICE O/P EST LOW 20 MIN: CPT | Performed by: NURSE PRACTITIONER

## 2022-08-11 PROCEDURE — 83540 ASSAY OF IRON: CPT | Performed by: INTERNAL MEDICINE

## 2022-08-31 RX ORDER — ATORVASTATIN CALCIUM 40 MG/1
40 TABLET, FILM COATED ORAL DAILY
Qty: 90 TABLET | Refills: 1 | Status: SHIPPED | OUTPATIENT
Start: 2022-08-31 | End: 2023-03-05 | Stop reason: SDUPTHER

## 2022-08-31 RX ORDER — LISINOPRIL 40 MG/1
40 TABLET ORAL DAILY
Qty: 90 TABLET | Refills: 1 | Status: SHIPPED | OUTPATIENT
Start: 2022-08-31 | End: 2023-03-05 | Stop reason: SDUPTHER

## 2022-10-12 NOTE — TELEPHONE ENCOUNTER
Rx Refill Note  Requested Prescriptions     Pending Prescriptions Disp Refills   • sertraline (ZOLOFT) 50 MG tablet 90 tablet 1     Sig: Take 1 tablet by mouth Daily.      Last office visit with prescribing clinician: 6/7/2022      Next office visit with prescribing clinician: 12/12/2022            Sarath Friend  10/12/22, 12:11 EDT

## 2022-10-19 NOTE — PROGRESS NOTES
[facility] from [facility] Rnee Mendez 84 M  Harinder Musa, PhD, MPH, APN  [facility] [phone]  F [phone]  Email [email or web address]      From: [email or web address]   Sent: Tuesday, October 18, 2022 3:13 PM  To: Darron Torres <[email or web address]  Subject: [EXTERNAL] Meds    WARNING! Based upon the critical issue with ransomware targeting Healthcare systems ALL attachments and links from this email should be heavily scrutinized  Hi I'm due for my Vyvanse again  Could you send it please? Thank you     Sent from [facility] on [facility] Subjective   Dick Galindo is a 74 y.o. male.     Chief Complaint   Patient presents with   • Hypertension        History of Present Illness    Coronavirus pandemic telehealth visit.  Patient signed in on Zoom but video would not connect.     Follow-up hypertension.  Last visit we added carvedilol 3.125 mg a day to his previous medications.  His blood pressures running 140 up to 150 systolic.  He has been checking his blood pressure since.  Now running about 130 up to 140 systolic.  No cardiovascular symptoms.  He otherwise feels well.  He is having some allergy symptoms with occasional wheezing.  He like a refill of his Ventolin which he uses occasionally.  He is not using his Flonase nasal spray.  He has ongoing issues with lower urinary tract symptoms.  Is taking tamsulosin twice a day as prescribed by the urologist.  He is scheduled for a TURP plan was rescheduled because of the coronavirus pandemic.    For his hypertension addition of the carvedilol he continues amlodipine 10 mg a day and lisinopril 40 mg a day.  In addition the tamsulosin 0.4 mg twice a day is likely also giving him some blood pressure control.  Diastolic blood pressure readings have been normal.      The following portions of the patient's history were reviewed and updated as appropriate: allergies, current medications, past family history, past medical history, past social history, past surgical history and problem list.          Review of Systems   Constitutional: Negative.    Respiratory: Negative.    Cardiovascular: Negative.    Musculoskeletal: Negative.        Objective   There were no vitals taken for this visit.  Physical Exam   Constitutional: He is oriented to person, place, and time.   He sounds in no acute distress.  No respiratory distress.   Neurological: He is alert and oriented to person, place, and time.   Psychiatric: He has a normal mood and affect. His behavior is normal.       Assessment/Plan   Dick was seen today for  hypertension.    Diagnoses and all orders for this visit:    Essential hypertension    Bronchospasm with bronchitis, acute  -     albuterol sulfate HFA (Ventolin HFA) 108 (90 Base) MCG/ACT inhaler; 2 puffs Q4H for cough and wheeze    Acute non-recurrent sinusitis, unspecified location  -     fluticasone (FLONASE) 50 MCG/ACT nasal spray; 2 sprays into the nostril(s) as directed by provider Daily.    Other orders  -     tamsulosin (FLOMAX) 0.4 MG capsule 24 hr capsule; Take 1 capsule by mouth 2 (Two) Times a Day.  -     carvedilol (COREG) 6.25 MG tablet; Take 1 tablet by mouth 2 (Two) Times a Day With Meals.      Hypertension.  Better control but still room for improvement.  Were going to increase the carvedilol from 3.125 mg twice a day up to 6.25 mg twice a day.  He will monitor his blood pressure daily and send us readings in 3 weeks.  He will call with lightheadedness or dizziness or low blood pressure readings.  He does take occasional of Ventolin, if the carvedilol increases the wheezing, will need to adjust accordingly.  He states that Ventolin is mostly for intermittent allergy chest symptoms.  Typically just in the spring and summer.  Otherwise I will see him back this fall for annual Medicare wellness visit and blood pressure recheck.    Duration of visit 15 minutes.

## 2022-10-26 DIAGNOSIS — J20.9 BRONCHOSPASM WITH BRONCHITIS, ACUTE: ICD-10-CM

## 2022-10-26 RX ORDER — ALBUTEROL SULFATE 90 UG/1
AEROSOL, METERED RESPIRATORY (INHALATION)
Qty: 18 G | Refills: 1 | Status: SHIPPED | OUTPATIENT
Start: 2022-10-26

## 2022-11-15 RX ORDER — HYDROCHLOROTHIAZIDE 12.5 MG/1
12.5 TABLET ORAL DAILY
Qty: 90 TABLET | Refills: 1 | Status: SHIPPED | OUTPATIENT
Start: 2022-11-15

## 2022-11-15 RX ORDER — LEVOTHYROXINE SODIUM 0.07 MG/1
75 TABLET ORAL DAILY
Qty: 90 TABLET | Refills: 1 | Status: SHIPPED | OUTPATIENT
Start: 2022-11-15

## 2022-11-15 RX ORDER — AMLODIPINE BESYLATE 10 MG/1
10 TABLET ORAL DAILY
Qty: 90 TABLET | Refills: 1 | Status: SHIPPED | OUTPATIENT
Start: 2022-11-15

## 2022-11-15 RX ORDER — CARVEDILOL 6.25 MG/1
6.25 TABLET ORAL 2 TIMES DAILY WITH MEALS
Qty: 180 TABLET | Refills: 1 | Status: SHIPPED | OUTPATIENT
Start: 2022-11-15

## 2022-11-15 NOTE — TELEPHONE ENCOUNTER
Rx Refill Note  Requested Prescriptions     Pending Prescriptions Disp Refills   • carvedilol (COREG) 6.25 MG tablet 180 tablet 1     Sig: Take 1 tablet by mouth 2 (Two) Times a Day With Meals.   • amLODIPine (NORVASC) 10 MG tablet 90 tablet 1     Sig: Take 1 tablet by mouth Daily.   • hydroCHLOROthiazide (HYDRODIURIL) 12.5 MG tablet 90 tablet 1     Sig: Take 1 tablet by mouth Daily.   • levothyroxine (SYNTHROID, LEVOTHROID) 75 MCG tablet 90 tablet 1     Sig: Take 1 tablet by mouth Daily.      Last office visit with prescribing clinician: 6/7/2022      Next office visit with prescribing clinician: 12/12/2022            Sarath Friend  11/15/22, 09:07 EST

## 2022-11-16 ENCOUNTER — TELEPHONE (OUTPATIENT)
Dept: GASTROENTEROLOGY | Facility: CLINIC | Age: 77
End: 2022-11-16

## 2022-11-16 ENCOUNTER — OFFICE VISIT (OUTPATIENT)
Dept: GASTROENTEROLOGY | Facility: CLINIC | Age: 77
End: 2022-11-16

## 2022-11-16 VITALS
OXYGEN SATURATION: 96 % | TEMPERATURE: 97.8 F | BODY MASS INDEX: 37.12 KG/M2 | WEIGHT: 231 LBS | SYSTOLIC BLOOD PRESSURE: 122 MMHG | DIASTOLIC BLOOD PRESSURE: 76 MMHG | HEIGHT: 66 IN | HEART RATE: 59 BPM

## 2022-11-16 DIAGNOSIS — R19.5 HEME POSITIVE STOOL: ICD-10-CM

## 2022-11-16 DIAGNOSIS — D50.0 IRON DEFICIENCY ANEMIA DUE TO CHRONIC BLOOD LOSS: ICD-10-CM

## 2022-11-16 DIAGNOSIS — K63.5 POLYP OF COLON, UNSPECIFIED PART OF COLON, UNSPECIFIED TYPE: Primary | ICD-10-CM

## 2022-11-16 DIAGNOSIS — K22.719 BARRETT'S ESOPHAGUS WITH DYSPLASIA: ICD-10-CM

## 2022-11-16 PROCEDURE — 99214 OFFICE O/P EST MOD 30 MIN: CPT | Performed by: INTERNAL MEDICINE

## 2022-11-16 NOTE — PROGRESS NOTES
Chief Complaint   Patient presents with   • Heartburn       History of Present Illness:   77 y.o. 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  He last had an EGD and colonoscopy in 5 of 2022.  I recommended a repeat EGD and colonoscopy in 3 to 5 years.       He saw the Hematologist and was put on iron pills BID. No rectal bleeding. His stool was dark on iron pills. No diarrhea or constiopation. No abdominal or chest pain. No nasuea or vomting. TAkes Cally Selzer Plus prn. Weight stable.     Past Medical History:   Diagnosis Date   • Anxiety    • Asthma     mild case, allergies   • Castellon esophagus    • Bronchitis    • Colon polyps    • Diverticulitis    • Enlarged prostate    • GERD (gastroesophageal reflux disease)    • H/O bladder infections    • H/O bronchitis    • High cholesterol    • History of anemia    • Hypertension    • Hypothyroidism    • Seasonal allergies    • Sleep apnea     CPAP   • Slow urinary stream        Past Surgical History:   Procedure Laterality Date   • COLONOSCOPY N/A approx 2012   • COLONOSCOPY N/A 02/06/2018    Procedure: COLONOSCOPY INTO CEECUM AND TERMINAL MILEUM WITH COLD BIOPSY POLYPECTOMIES & COLD BIOIPSIES;  Surgeon: Usman Roper MD;  Location: [facility] ENDOSCOPY;  Service:    • COLONOSCOPY N/A 12/03/2020    Procedure: COLONOSCOPY TO CECUM AND INTO TI WITH COLD BIOPSIES AND COLD BIOPSY POLYPECTOMY;  Surgeon: Usman Roper MD;  Location: [facility] ENDOSCOPY;  Service: Gastroenterology;  Laterality: N/A;  pre: history of colon polyps  post: diverticulosis, polyp, and internal hemorrhoids   • COLONOSCOPY N/A 5/31/2022    Procedure: COLONOSCOPY to cecum and TI with biopsies and cold forcep polypectomies;  Surgeon: Usman Roper MD;  Location: [facility] ENDOSCOPY;  Service: Gastroenterology;  Laterality: N/A;  pre- hx of colon polyps, melena, iron deficiency anemia  post- diverticulosis, polyps, polyp stalk, internal hemorrhoids   • CYSTOSCOPY  · Not on BB  · INR therapeutic, continue coumadin TRANSURETHRAL RESECTION OF PROSTATE N/A 07/06/2020    Procedure: TRANSURETHRAL RESECTION OF PROSTATE;  Surgeon: Charanjit Cameron MD;  Location: [facility] MAIN OR;  Service: Urology;  Laterality: N/A;   • ENDOSCOPY N/A 02/06/2018    Procedure: ESOPHAGOGASTRODUODENOSCOPY WITH COLD BIOPSIES;  Surgeon: Usman Roper MD;  Location: [facility] ENDOSCOPY;  Service:    • ENDOSCOPY N/A 06/03/2021    Procedure: ESOPHAGOGASTRODUODENOSCOPY WITH COLD BIOPSIES;  Surgeon: Usman Roper MD;  Location: [facility] ENDOSCOPY;  Service: Gastroenterology;  Laterality: N/A;  PRE:IRON DEFICIENCY ANEMIA  POST: GASTRITIS   • ENDOSCOPY N/A 5/31/2022    Procedure: ESOPHAGOGASTRODUODENOSCOPY with biopsies;  Surgeon: Usman Roper MD;  Location: [facility] ENDOSCOPY;  Service: Gastroenterology;  Laterality: N/A;  pre- Castellon's esophagus  post- gastritis, gastric polyps   • TONSILLECTOMY Bilateral 1954   • UPPER GASTROINTESTINAL ENDOSCOPY           Current Outpatient Medications:   •  acetaminophen (TYLENOL) 325 MG tablet, Take 650 mg by mouth Every 6 (Six) Hours As Needed., Disp: , Rfl:   •  albuterol sulfate HFA (Ventolin HFA) 108 (90 Base) MCG/ACT inhaler, 2 puffs Q4H for cough and wheeze, Disp: 18 g, Rfl: 1  •  amLODIPine (NORVASC) 10 MG tablet, Take 1 tablet by mouth Daily., Disp: 90 tablet, Rfl: 1  •  atorvastatin (LIPITOR) 40 MG tablet, Take 1 tablet by mouth Daily., Disp: 90 tablet, Rfl: 1  •  carvedilol (COREG) 6.25 MG tablet, Take 1 tablet by mouth 2 (Two) Times a Day With Meals., Disp: 180 tablet, Rfl: 1  •  hydroCHLOROthiazide (HYDRODIURIL) 12.5 MG tablet, Take 1 tablet by mouth Daily., Disp: 90 tablet, Rfl: 1  •  lansoprazole (PREVACID) 30 MG capsule, Take 1 capsule by mouth Daily., Disp: 90 capsule, Rfl: 3  •  levothyroxine (SYNTHROID, LEVOTHROID) 75 MCG tablet, Take 1 tablet by mouth Daily., Disp: 90 tablet, Rfl: 1  •  lisinopril (PRINIVIL,ZESTRIL) 40 MG tablet, Take 1 tablet by mouth Daily., Disp: 90 tablet, Rfl: 1  •  sertraline (ZOLOFT)  50 MG tablet, Take 1 tablet by mouth Daily., Disp: 90 tablet, Rfl: 1  •  doxycycline (MONODOX) 100 MG capsule, Take 1 capsule by mouth 2 (Two) Times a Day for 10 days., Disp: 20 capsule, Rfl: 0    Allergies   Allergen Reactions   • Amoxicillin Itching and Swelling   • Levaquin [Levofloxacin] Nausea And Vomiting       Family History   Problem Relation Age of Onset   • Stroke Father    • Heart attack Father    • No Known Problems Maternal Grandmother    • No Known Problems Maternal Grandfather    • No Known Problems Paternal Grandmother    • No Known Problems Paternal Grandfather    • Heart disease Brother    • Malig Hyperthermia Neg Hx    • Colon cancer Neg Hx    • Colon polyps Neg Hx        Social History     Socioeconomic History   • Marital status:    Tobacco Use   • Smoking status: Former     Packs/day: 2.00     Years: 14.00     Pack years: 28.00     Types: Cigarettes     Quit date: 1980     Years since quittin.9   • Smokeless tobacco: Never   • Tobacco comments:     He smoked between the ages of 18 and 32.   Vaping Use   • Vaping Use: Never used   Substance and Sexual Activity   • Alcohol use: Yes     Alcohol/week: 4.0 standard drinks     Types: 4 Glasses of wine per week     Comment: occ.   • Drug use: No   • Sexual activity: Yes       Review of Systems   Gastrointestinal: Negative for abdominal pain, constipation and diarrhea.   All other systems reviewed and are negative.    Pertinent positives and negatives documented in the HPI and all other systems reviewed and were found to be negative.  Vitals:    22 0927   BP: 122/76   Pulse: 59   Temp: 97.8 °F (36.6 °C)   SpO2: 96%       Physical Exam  Vitals reviewed.   Constitutional:       General: He is not in acute distress.     Appearance: Normal appearance. He is well-developed. He is obese. He is not diaphoretic.   HENT:      Head: Normocephalic and atraumatic. Hair is normal.      Right Ear: Hearing, tympanic membrane, ear canal and  external ear normal.      Left Ear: Hearing, tympanic membrane, ear canal and external ear normal.      Nose: Nose normal. No nasal deformity.      Mouth/Throat:      Mouth: Mucous membranes are moist. No oral lesions.      Pharynx: Uvula midline. No uvula swelling.   Eyes:      General: Lids are normal. No scleral icterus.        Right eye: No discharge.         Left eye: No discharge.      Extraocular Movements: Extraocular movements intact.      Right eye: Normal extraocular motion and no nystagmus.      Left eye: Normal extraocular motion and no nystagmus.      Conjunctiva/sclera: Conjunctivae normal.      Pupils: Pupils are equal, round, and reactive to light.   Neck:      Thyroid: No thyromegaly.      Vascular: No JVD.   Cardiovascular:      Rate and Rhythm: Normal rate and regular rhythm.      Pulses: Normal pulses.      Heart sounds: Normal heart sounds. No murmur heard.    No gallop.   Pulmonary:      Effort: Pulmonary effort is normal. No respiratory distress.      Breath sounds: Normal breath sounds. No wheezing or rales.   Chest:      Chest wall: No tenderness.   Abdominal:      General: Bowel sounds are normal. There is no distension.      Palpations: Abdomen is soft. There is no mass.      Tenderness: There is no abdominal tenderness. There is no guarding.      Hernia: No hernia is present.   Musculoskeletal:         General: No tenderness or deformity. Normal range of motion.      Cervical back: Normal range of motion and neck supple.   Lymphadenopathy:      Cervical: No cervical adenopathy.   Skin:     General: Skin is warm and dry.      Findings: No rash.   Neurological:      Mental Status: He is alert and oriented to person, place, and time.      Cranial Nerves: No cranial nerve deficit.      Motor: No abnormal muscle tone.      Coordination: Coordination normal.      Deep Tendon Reflexes: Reflexes are normal and symmetric. Reflexes normal.   Psychiatric:         Mood and Affect: Mood normal.          Behavior: Behavior normal.         Thought Content: Thought content normal.         Judgment: Judgment normal.         Diagnoses and all orders for this visit:    1. Polyp of colon, unspecified part of colon, unspecified type (Primary)    2. Castellon's esophagus with dysplasia    3. Iron deficiency anemia due to chronic blood loss  -     Endoscopy, GI With Capsule    4. Heme positive stool  -     Endoscopy, GI With Capsule      Assessment:  1.  History of Castellon's esophagus without dysplasia.  He last had an EGD in 5 of 2022.  I recommended a repeat EGD in 3 to 5 years.  2.  History of colon polyps.  He last had a colonoscopy in 5/2022.  I had recommended a repeat colonoscopy in 3 to 5 years.  3. H/o iron def anemia and heme positive stool.    Recommendations:  1. Capsule Endoscopy    No follow-ups on file.    Usman Roper MD  11/16/2022

## 2022-11-22 NOTE — TELEPHONE ENCOUNTER
Hub staff attempted to follow warm transfer process and was unsuccessful     Caller: Dick Galindo    Relationship to patient: Self    Best call back number: [phone]    Patient is needing: PT IS UNABLE TO FIND MAGNESIUM CITRATE AND IS CALLING FOR THE OTHER PREP OPTION. PLEASE CALL PT BACK. CAPSUL STUDY IS ON Monday 11/28/22

## 2022-11-23 NOTE — TELEPHONE ENCOUNTER
I would have him take the following the day before the capsule endoscopy:   - Take one dose of Miralax (17 gms mixed with water) at noon and then a second dose of Miralax at 6 pm the day before the Capsule Endoscopy. Dominique   Emailed to geraldo

## 2022-11-28 ENCOUNTER — CLINICAL SUPPORT (OUTPATIENT)
Dept: GASTROENTEROLOGY | Facility: CLINIC | Age: 77
End: 2022-11-28

## 2022-11-28 ENCOUNTER — TELEPHONE (OUTPATIENT)
Dept: GASTROENTEROLOGY | Facility: CLINIC | Age: 77
End: 2022-11-28

## 2022-11-28 DIAGNOSIS — D50.9 IRON DEFICIENCY ANEMIA, UNSPECIFIED IRON DEFICIENCY ANEMIA TYPE: Chronic | ICD-10-CM

## 2022-11-28 PROCEDURE — 91110 GI TRC IMG INTRAL ESOPH-ILE: CPT | Performed by: INTERNAL MEDICINE

## 2022-12-01 ENCOUNTER — OFFICE VISIT (OUTPATIENT)
Dept: SLEEP MEDICINE | Facility: HOSPITAL | Age: 77
End: 2022-12-01

## 2022-12-01 DIAGNOSIS — G47.33 OSA ON CPAP: Primary | ICD-10-CM

## 2022-12-01 DIAGNOSIS — G47.36 HYPOXEMIA ASSOCIATED WITH SLEEP: ICD-10-CM

## 2022-12-01 DIAGNOSIS — Z99.89 OSA ON CPAP: Primary | ICD-10-CM

## 2022-12-01 PROCEDURE — G0463 HOSPITAL OUTPT CLINIC VISIT: HCPCS

## 2022-12-01 PROCEDURE — 99213 OFFICE O/P EST LOW 20 MIN: CPT | Performed by: INTERNAL MEDICINE

## 2022-12-01 NOTE — PROGRESS NOTES
Follow Up [facility] Note     Chief Complaint:  ROMINA     The patient did receive a new DreamStation 2 auto CPAP from [facility] related to the recall  or 2022    Primary Care Physician: Mynor Russ MD    Interval History:   The patient is a 77 y.o. male  who I last saw 2021 and that note was reviewed.  The patient reports she is stable without new complaints.  The patient states he goes to bed 11 PM and awakens at 8 AM.    Review of Systems:    A complete review of systems was done and all were negative with the exception of some postnasal drip    Social History:    Social History     Socioeconomic History   • Marital status:    Tobacco Use   • Smoking status: Former     Packs/day: 2.00     Years: 14.00     Pack years: 28.00     Types: Cigarettes     Quit date: 1980     Years since quittin.9   • Smokeless tobacco: Never   • Tobacco comments:     He smoked between the ages of 18 and 32.   Vaping Use   • Vaping Use: Never used   Substance and Sexual Activity   • Alcohol use: Yes     Alcohol/week: 4.0 standard drinks     Types: 4 Glasses of wine per week     Comment: occ.   • Drug use: No   • Sexual activity: Yes       Allergies:  Amoxicillin and Levaquin [levofloxacin]     Medication Review:  Reviewed.      Vital Signs: Stated weight 225 pounds.  Last year weight was 231 pounds.  BMI 36-37.    Physical Exam:    Constitutional:  Well developed 77 y.o. male that appears in no apparent distress.  Awake & oriented times 3.  Normal mood with normal recent and remote memory and normal judgement.  Eyes:  Conjunctivae normal.  Oropharynx: Previously, moist mucous membranes without exudate and a large tongue and normal uvula and narrowed posterior pharyngeal opening and class II-3 Mallampati airway, patient is wearing a facemask.    Self-administered [place] Sleepiness Scale test results: 4, previously 6  0-5 Lower normal daytime sleepiness  6-10 Higher normal daytime  sleepiness  11-12 Mild, 13-15 Moderate, & 16-24 Severe excessive daytime sleepiness     Downloaded PAP Data Reviewed For Compliance:  DME is [facility] and he uses nasal pillows.  Downloads between 8/31 and 11/28/2022 compliance 96%.  Average usage is 8 hours and 9 minutes.  Average AHI is normal without leak.  Average auto CPAP pressure is 10 and his DreamStation 2 auto CPAP is 8-20    I have reviewed the above results and compared them with the patient's last downloads and reviewed with the patient.    Impression:   Severe obstructive sleep apnea with sleep-related hypoxia by home sleep study 4/7/2018 adequately treated with auto CPAP. The patient appears to be at goal with good compliance and usage. The patient has no complaints of hypersomnolence.     Plan:  Good sleep hygiene measures should be maintained.  Weight loss would be beneficial in this patient who has class II severe obesity by BMI 36-37     After evaluating the patient and assessing results available, the patient is benefiting from the treatment being provided.     The patient will continue DreamStation 2 auto CPAP.  After clinical evaluation and review of downloads, I recommend no changes to the patient's pressures.  A new prescription will be sent to the patient's DME.    The patient reports he is unhappy with the service [facility] provides.  Will discuss with Hal[facility]/Robert to see if customer service can be improved.    I answered all of the patient's questions.  The patient will call for any problems and will follow up in 1 year.      Kendrick Diaz MD  Sleep Medicine  12/04/22  10:37 EST

## 2022-12-04 PROBLEM — G47.14 HYPERSOMNIA DUE TO MEDICAL CONDITION: Status: RESOLVED | Noted: 2019-12-15 | Resolved: 2022-12-04

## 2022-12-05 ENCOUNTER — TELEPHONE (OUTPATIENT)
Dept: SLEEP MEDICINE | Facility: HOSPITAL | Age: 77
End: 2022-12-05

## 2022-12-05 NOTE — TELEPHONE ENCOUNTER
Patient not happy with [facility] as DME , [facility] uses [facility] for resupply . Sending orders to [facility]

## 2022-12-07 ENCOUNTER — TELEPHONE (OUTPATIENT)
Dept: GASTROENTEROLOGY | Facility: CLINIC | Age: 77
End: 2022-12-07

## 2022-12-07 NOTE — TELEPHONE ENCOUNTER
SA/MT -please call the patient and tell him that his capsule endoscopy (which was done 11/28/2022) came back normal.  There were no small bowel abnormalities noted, no bleeding, and no source of anemia was identified.  The capsule did reach the colon, which is good.       Dr. Hein and Dr. Jeb PALACIOS

## 2023-01-03 RX ORDER — LANSOPRAZOLE 30 MG/1
CAPSULE, DELAYED RELEASE ORAL
Qty: 90 CAPSULE | Refills: 3 | Status: SHIPPED | OUTPATIENT
Start: 2023-01-03

## 2023-01-05 ENCOUNTER — LAB (OUTPATIENT)
Dept: OTHER | Facility: HOSPITAL | Age: 78
End: 2023-01-05
Payer: MEDICARE

## 2023-01-05 ENCOUNTER — OFFICE VISIT (OUTPATIENT)
Dept: ONCOLOGY | Facility: CLINIC | Age: 78
End: 2023-01-05
Payer: MEDICARE

## 2023-01-05 VITALS
SYSTOLIC BLOOD PRESSURE: 148 MMHG | WEIGHT: 234.4 LBS | RESPIRATION RATE: 16 BRPM | DIASTOLIC BLOOD PRESSURE: 71 MMHG | OXYGEN SATURATION: 97 % | TEMPERATURE: 98.2 F | HEART RATE: 53 BPM | BODY MASS INDEX: 37.67 KG/M2 | HEIGHT: 66 IN

## 2023-01-05 DIAGNOSIS — D50.0 IRON DEFICIENCY ANEMIA DUE TO CHRONIC BLOOD LOSS: Primary | ICD-10-CM

## 2023-01-05 DIAGNOSIS — D50.0 IRON DEFICIENCY ANEMIA DUE TO CHRONIC BLOOD LOSS: ICD-10-CM

## 2023-01-05 LAB
BASOPHILS # BLD AUTO: 0.06 10*3/MM3 (ref 0–0.2)
BASOPHILS NFR BLD AUTO: 0.8 % (ref 0–1.5)
DEPRECATED RDW RBC AUTO: 43.5 FL (ref 37–54)
EOSINOPHIL # BLD AUTO: 0.19 10*3/MM3 (ref 0–0.4)
EOSINOPHIL NFR BLD AUTO: 2.6 % (ref 0.3–6.2)
ERYTHROCYTE [DISTWIDTH] IN BLOOD BY AUTOMATED COUNT: 12.8 % (ref 12.3–15.4)
FERRITIN SERPL-MCNC: 72.3 NG/ML (ref 30–400)
HCT VFR BLD AUTO: 45.3 % (ref 37.5–51)
HGB BLD-MCNC: 14.6 G/DL (ref 13–17.7)
IMM GRANULOCYTES # BLD AUTO: 0.02 10*3/MM3 (ref 0–0.05)
IMM GRANULOCYTES NFR BLD AUTO: 0.3 % (ref 0–0.5)
IRON 24H UR-MRATE: 90 MCG/DL (ref 59–158)
IRON SATN MFR SERPL: 27 % (ref 20–50)
LYMPHOCYTES # BLD AUTO: 1.58 10*3/MM3 (ref 0.7–3.1)
LYMPHOCYTES NFR BLD AUTO: 21.4 % (ref 19.6–45.3)
MCH RBC QN AUTO: 29.7 PG (ref 26.6–33)
MCHC RBC AUTO-ENTMCNC: 32.2 G/DL (ref 31.5–35.7)
MCV RBC AUTO: 92.1 FL (ref 79–97)
MONOCYTES # BLD AUTO: 0.69 10*3/MM3 (ref 0.1–0.9)
MONOCYTES NFR BLD AUTO: 9.3 % (ref 5–12)
NEUTROPHILS NFR BLD AUTO: 4.86 10*3/MM3 (ref 1.7–7)
NEUTROPHILS NFR BLD AUTO: 65.6 % (ref 42.7–76)
NRBC BLD AUTO-RTO: 0 /100 WBC (ref 0–0.2)
PLATELET # BLD AUTO: 231 10*3/MM3 (ref 140–450)
PMV BLD AUTO: 8.8 FL (ref 6–12)
RBC # BLD AUTO: 4.92 10*6/MM3 (ref 4.14–5.8)
TIBC SERPL-MCNC: 329 MCG/DL (ref 298–536)
TRANSFERRIN SERPL-MCNC: 221 MG/DL (ref 200–360)
WBC NRBC COR # BLD: 7.4 10*3/MM3 (ref 3.4–10.8)

## 2023-01-05 PROCEDURE — 84466 ASSAY OF TRANSFERRIN: CPT | Performed by: INTERNAL MEDICINE

## 2023-01-05 PROCEDURE — 99213 OFFICE O/P EST LOW 20 MIN: CPT | Performed by: NURSE PRACTITIONER

## 2023-01-05 PROCEDURE — 85025 COMPLETE CBC W/AUTO DIFF WBC: CPT | Performed by: INTERNAL MEDICINE

## 2023-01-05 PROCEDURE — 83540 ASSAY OF IRON: CPT | Performed by: INTERNAL MEDICINE

## 2023-01-05 PROCEDURE — 82728 ASSAY OF FERRITIN: CPT | Performed by: INTERNAL MEDICINE

## 2023-01-05 PROCEDURE — 36415 COLL VENOUS BLD VENIPUNCTURE: CPT

## 2023-01-05 NOTE — PROGRESS NOTES
Subjective   Dick Galindo is a 77 y.o. male.  Referred by Dr. Rodriguez for evaluation and management of anemia    History of Present Illness   Mr. Dubois is a 75-year-old  male with history of hypertension, hyperlipidemia, BPH status post TURP on 7/7/2020 presents for further evaluation of anemia.  He was scheduled for the TURP procedure on 7/7/2020 at which point a CBC was checked and his hemoglobin was noted to be low at 9.2.  This prompted further work-up with vitamin B12, folic acid, iron studies.  The vitamin B12 and folic acid levels were noted to be normal.  Iron studies showed a low ferritin consistent with iron deficiency.  He was then placed on oral iron which he has been taking for the past 2 weeks.  Today a repeat CBC shows an improvement in hemoglobin to 11.2.  He denies any lightheadedness, dizziness, chest pain, dyspnea, palpitations, lower extremity edema.  He reports having lost about 10 pounds over the past 2 weeks, he attributes this to changes in his diet as well as decreased appetite since the surgery.  He denies noting any blood in his stool any melena.  Denies any hematemesis.  He has been on Prevacid for a long time now for GERD.  His last colonoscopy was in 2018 on which there was diverticulosis, polyps and internal hemorrhoids noted.  EGD was also performed in 2018 on which mildly erythematous mucosa of the esophagus was noted but no other abnormalities.  He was scheduled for a colonoscopy in February 2020 by Dr. Roper however this has been delayed because of COVID and he plans to undergo colonoscopy soon.  6/3/2021 EGD-Z-line is irregular, 39 cm from the incisors.  Biopsied  Erythematous mucosa in the stomach biopsied.  No gross lesions in the entire examined duodenum.  Stomach biopsies consistent with scattered changes suggestive of mild reactive gastropathy.  GE junction biopsy with mild esophagitis and focal goblet cell metaplasia Castellon's esophagus.    Interval history  The  patient returns today for lab review and follow-up.  He continues taking oral iron 2 tablets every other day.  He is tolerating this well.  He is eating and drinking adequately.  Bowels moving regularly.  Denies fever or chills.  Denies nausea or vomiting.  Denies dizziness, lightheadedness, shortness of breath.  Denies obvious signs of bleeding.    He had a capsule endoscopy November 2022 with GI, which was negative.    He has no new concerns today.     The following portions of the patient's history were reviewed and updated as appropriate: allergies, current medications, past family history, past medical history, past social history, past surgical history and problem list.    Past Medical History:   Diagnosis Date   • Anxiety    • Asthma     mild case, allergies   • Castellon esophagus    • Bronchitis    • Colon polyps    • Diverticulitis    • Enlarged prostate    • GERD (gastroesophageal reflux disease)    • H/O bladder infections    • H/O bronchitis    • High cholesterol    • History of anemia    • Hypertension    • Hypothyroidism    • Seasonal allergies    • Sleep apnea     CPAP   • Slow urinary stream         Past Surgical History:   Procedure Laterality Date   • COLONOSCOPY N/A approx 2012   • COLONOSCOPY N/A 02/06/2018    Procedure: COLONOSCOPY INTO CEECUM AND TERMINAL MILEUM WITH COLD BIOPSY POLYPECTOMIES & COLD BIOIPSIES;  Surgeon: Usman Roper MD;  Location: [facility] ENDOSCOPY;  Service:    • COLONOSCOPY N/A 12/03/2020    Procedure: COLONOSCOPY TO CECUM AND INTO TI WITH COLD BIOPSIES AND COLD BIOPSY POLYPECTOMY;  Surgeon: Usman Roper MD;  Location: [facility] ENDOSCOPY;  Service: Gastroenterology;  Laterality: N/A;  pre: history of colon polyps  post: diverticulosis, polyp, and internal hemorrhoids   • COLONOSCOPY N/A 5/31/2022    Procedure: COLONOSCOPY to cecum and TI with biopsies and cold forcep polypectomies;  Surgeon: Usman Roper MD;  Location: [facility] ENDOSCOPY;  Service: Gastroenterology;  Laterality:  N/A;  pre- hx of colon polyps, melena, iron deficiency anemia  post- diverticulosis, polyps, polyp stalk, internal hemorrhoids   • CYSTOSCOPY TRANSURETHRAL RESECTION OF PROSTATE N/A 2020    Procedure: TRANSURETHRAL RESECTION OF PROSTATE;  Surgeon: Charanjit Cameron MD;  Location: [facility] MAIN OR;  Service: Urology;  Laterality: N/A;   • ENDOSCOPY N/A 2018    Procedure: ESOPHAGOGASTRODUODENOSCOPY WITH COLD BIOPSIES;  Surgeon: Usman Roper MD;  Location: [facility] ENDOSCOPY;  Service:    • ENDOSCOPY N/A 2021    Procedure: ESOPHAGOGASTRODUODENOSCOPY WITH COLD BIOPSIES;  Surgeon: Usman Roper MD;  Location: [facility] ENDOSCOPY;  Service: Gastroenterology;  Laterality: N/A;  PRE:IRON DEFICIENCY ANEMIA  POST: GASTRITIS   • ENDOSCOPY N/A 2022    Procedure: ESOPHAGOGASTRODUODENOSCOPY with biopsies;  Surgeon: Usman Roper MD;  Location: [facility] ENDOSCOPY;  Service: Gastroenterology;  Laterality: N/A;  pre- Castellon's esophagus  post- gastritis, gastric polyps   • TONSILLECTOMY Bilateral    • UPPER GASTROINTESTINAL ENDOSCOPY          Family History   Problem Relation Age of Onset   • Stroke Father    • Heart attack Father    • No Known Problems Maternal Grandmother    • No Known Problems Maternal Grandfather    • No Known Problems Paternal Grandmother    • No Known Problems Paternal Grandfather    • Heart disease Brother    • Malig Hyperthermia Neg Hx    • Colon cancer Neg Hx    • Colon polyps Neg Hx         Social History     Socioeconomic History   • Marital status:    Tobacco Use   • Smoking status: Former     Packs/day: 2.00     Years: 14.00     Pack years: 28.00     Types: Cigarettes     Quit date: 1980     Years since quittin.0   • Smokeless tobacco: Never   • Tobacco comments:     He smoked between the ages of 18 and 32.   Vaping Use   • Vaping Use: Never used   Substance and Sexual Activity   • Alcohol use: Yes     Alcohol/week: 4.0 standard drinks     Types: 4 Glasses of wine  per week     Comment: occ.   • Drug use: No   • Sexual activity: Yes             Allergies   Allergen Reactions   • Amoxicillin Itching and Swelling   • Levaquin [Levofloxacin] Nausea And Vomiting      Review of systems as mentioned in the HPI    Objective   Blood pressure 148/71, pulse 53, temperature 98.2 °F (36.8 °C), temperature source Temporal, resp. rate 16, height 167 cm (65.75\"), weight 106 kg (234 lb 6.4 oz), SpO2 97 %.     Physical Exam   Constitutional: He is oriented to person, place, and time. He appears well-developed and well-nourished. He does not appear ill. No distress.   HENT:   Head: Normocephalic.   Nose: Nose normal.   Mouth/Throat: Oropharynx is clear and moist. No oropharyngeal exudate.   Eyes: Pupils are equal, round, and reactive to light. Conjunctivae are normal.   Neck: No JVD present. No tracheal deviation present. No thyromegaly present.   Cardiovascular: Normal rate, regular rhythm and normal heart sounds.   Pulmonary/Chest: Effort normal and breath sounds normal.   Abdominal: Soft. Bowel sounds are normal.   Musculoskeletal: Normal range of motion.   Neurological: He is alert and oriented to person, place, and time. He displays no weakness.   Skin: Skin is warm and dry. No rash noted.   Psychiatric: His behavior is normal. Mood, judgment and thought content normal.   Vitals reviewed.    I have reexamined the patient and the results are consistent with the previously documented exam. JEFF Mayers     Lab on 01/05/2023   Component Date Value Ref Range Status   • Ferritin 01/05/2023 72.30  30.00 - 400.00 ng/mL Final   • Iron 01/05/2023 90  59 - 158 mcg/dL Final   • Iron Saturation 01/05/2023 27  20 - 50 % Final   • Transferrin 01/05/2023 221  200 - 360 mg/dL Final   • TIBC 01/05/2023 329  298 - 536 mcg/dL Final   • WBC 01/05/2023 7.40  3.40 - 10.80 10*3/mm3 Final   • RBC 01/05/2023 4.92  4.14 - 5.80 10*6/mm3 Final   • Hemoglobin 01/05/2023 14.6  13.0 - 17.7 g/dL Final   •  Hematocrit 01/05/2023 45.3  37.5 - 51.0 % Final   • MCV 01/05/2023 92.1  79.0 - 97.0 fL Final   • MCH 01/05/2023 29.7  26.6 - 33.0 pg Final   • MCHC 01/05/2023 32.2  31.5 - 35.7 g/dL Final   • RDW 01/05/2023 12.8  12.3 - 15.4 % Final   • RDW-SD 01/05/2023 43.5  37.0 - 54.0 fl Final   • MPV 01/05/2023 8.8  6.0 - 12.0 fL Final   • Platelets 01/05/2023 231  140 - 450 10*3/mm3 Final   • Neutrophil % 01/05/2023 65.6  42.7 - 76.0 % Final   • Lymphocyte % 01/05/2023 21.4  19.6 - 45.3 % Final   • Monocyte % 01/05/2023 9.3  5.0 - 12.0 % Final   • Eosinophil % 01/05/2023 2.6  0.3 - 6.2 % Final   • Basophil % 01/05/2023 0.8  0.0 - 1.5 % Final   • Immature Grans % 01/05/2023 0.3  0.0 - 0.5 % Final   • Neutrophils, Absolute 01/05/2023 4.86  1.70 - 7.00 10*3/mm3 Final   • Lymphocytes, Absolute 01/05/2023 1.58  0.70 - 3.10 10*3/mm3 Final   • Monocytes, Absolute 01/05/2023 0.69  0.10 - 0.90 10*3/mm3 Final   • Eosinophils, Absolute 01/05/2023 0.19  0.00 - 0.40 10*3/mm3 Final   • Basophils, Absolute 01/05/2023 0.06  0.00 - 0.20 10*3/mm3 Final   • Immature Grans, Absolute 01/05/2023 0.02  0.00 - 0.05 10*3/mm3 Final   • nRBC 01/05/2023 0.0  0.0 - 0.2 /100 WBC Final        No radiology results for the last 30 days.     4/14/2022  CBC-WBC 5.8, hemoglobin 12.1 which is a decrease from 14 4 months ago, platelets 227  Ferritin low at 15.7  Iron studies show low iron saturation 10%, transferrin 282, TIBC 420    1/5/2023  CBC-within normal limits    Assessment & Plan      Mr. Holder is a 76-year-old  male referred for further work-up of anemia.  On reviewing the labs he is noted to have low hemoglobin since May 2019.  Ferritin was normal in the past however most recent ferritin from 7/8/2020 noted to be low at 12.  This is indicative of iron deficiency.  He has been on oral iron and has had a good response.    1.anemia  · Work-up consistent with iron deficiency   · Had a colonoscopy in December 2020 which does not show any evidence  of bleeding  · EGD June 2021 with esophagitis and Castellon's esophagus.  · He had some hemorrhoidal bleeding with stopped about 3 months ago.  · He continues on oral iron every other day.  · Hemoglobin today is normal at 14.1.  · Ferritin normal at 36.1, iron profile shows an iron saturation of 21% and TIBC 337  · Has not been on oral iron for about 4 to 5 months now.  · Hemoglobin decreased to 12.1 and therefore oral iron resumed.  · Repeat EGD and C-scope 5/31/2022 without any evidence of bleeding  · He will likely need a capsule study.  · Continue oral iron.  Recommend taking 2 daily.  · 1/5/2023: Taking oral iron 2 tablets every other day, tolerating well.  Hemoglobin today 14.6, ferritin 72.3, iron saturation 27%, TIBC 329.  Capsule endoscopy with GI 11/2022 came back normal, no source of anemia identified.    2.hypertension-blood pressure well controlled, continue current medications, blood pressure 148/71.    3.GI work-up for iron deficiency anemia-with C-scope and EGD performed 5/31/2022 negative.  · Capsule endoscopy with GI 11/28/2022 came back normal, no source of anemia.    4.hyperlipidemia , continue atorvastatin, stable    5.mood-continue Zoloft, stable    6.obesity-BMI 37.2.      PLAN:   · Decrease oral iron to 1 tablet every other day.  · Return in 3 months for MD follow-up with CBC, CMP, stat ferritin, stat iron profile.     JEFF Mayers  01/05/23

## 2023-01-10 ENCOUNTER — OFFICE VISIT (OUTPATIENT)
Dept: FAMILY MEDICINE CLINIC | Facility: CLINIC | Age: 78
End: 2023-01-10
Payer: MEDICARE

## 2023-01-10 VITALS
HEIGHT: 66 IN | TEMPERATURE: 97.8 F | HEART RATE: 56 BPM | BODY MASS INDEX: 37.82 KG/M2 | WEIGHT: 235.3 LBS | DIASTOLIC BLOOD PRESSURE: 70 MMHG | SYSTOLIC BLOOD PRESSURE: 140 MMHG | OXYGEN SATURATION: 97 %

## 2023-01-10 DIAGNOSIS — E03.8 SUBCLINICAL HYPOTHYROIDISM: ICD-10-CM

## 2023-01-10 DIAGNOSIS — E78.01 FAMILIAL HYPERCHOLESTEROLEMIA: ICD-10-CM

## 2023-01-10 DIAGNOSIS — I10 ESSENTIAL HYPERTENSION: ICD-10-CM

## 2023-01-10 DIAGNOSIS — Z00.00 MEDICARE ANNUAL WELLNESS VISIT, SUBSEQUENT: Primary | ICD-10-CM

## 2023-01-10 DIAGNOSIS — F33.42 RECURRENT MAJOR DEPRESSIVE DISORDER, IN FULL REMISSION: ICD-10-CM

## 2023-01-10 PROCEDURE — G0439 PPPS, SUBSEQ VISIT: HCPCS | Performed by: FAMILY MEDICINE

## 2023-01-10 PROCEDURE — 99214 OFFICE O/P EST MOD 30 MIN: CPT | Performed by: FAMILY MEDICINE

## 2023-01-10 PROCEDURE — 1170F FXNL STATUS ASSESSED: CPT | Performed by: FAMILY MEDICINE

## 2023-01-10 PROCEDURE — 1160F RVW MEDS BY RX/DR IN RCRD: CPT | Performed by: FAMILY MEDICINE

## 2023-01-10 NOTE — PROGRESS NOTES
The ABCs of the Annual Wellness Visit  Subsequent Medicare Wellness Visit    Subjective    Dick Galindo is a 77 y.o. male who presents for a Subsequent Medicare Wellness Visit.    The following portions of the patient's history were reviewed and   updated as appropriate: allergies, current medications, past family history, past medical history, past social history, past surgical history and problem list.    Compared to one year ago, the patient feels his physical   health is the same.    Compared to one year ago, the patient feels his mental   health is the same.    Recent Hospitalizations:  He was not admitted to the hospital during the last year.       Current Medical Providers:  Patient Care Team:  Mynor Russ MD as PCP - General (Family Medicine)  Mynor Russ MD as Referring Physician (Family Medicine)  Kajal Bean MD as Surgeon (General Surgery)  Harika Hein MD as Consulting Physician (Hematology and Oncology)    Outpatient Medications Prior to Visit   Medication Sig Dispense Refill   • acetaminophen (TYLENOL) 325 MG tablet Take 650 mg by mouth Every 6 (Six) Hours As Needed.     • albuterol sulfate HFA (Ventolin HFA) 108 (90 Base) MCG/ACT inhaler 2 puffs Q4H for cough and wheeze 18 g 1   • amLODIPine (NORVASC) 10 MG tablet Take 1 tablet by mouth Daily. 90 tablet 1   • atorvastatin (LIPITOR) 40 MG tablet Take 1 tablet by mouth Daily. 90 tablet 1   • carvedilol (COREG) 6.25 MG tablet Take 1 tablet by mouth 2 (Two) Times a Day With Meals. 180 tablet 1   • hydroCHLOROthiazide (HYDRODIURIL) 12.5 MG tablet Take 1 tablet by mouth Daily. 90 tablet 1   • lansoprazole (PREVACID) 30 MG capsule TAKE ONE CAPSULE BY MOUTH DAILY 90 capsule 3   • levothyroxine (SYNTHROID, LEVOTHROID) 75 MCG tablet Take 1 tablet by mouth Daily. 90 tablet 1   • lisinopril (PRINIVIL,ZESTRIL) 40 MG tablet Take 1 tablet by mouth Daily. 90 tablet 1   • sertraline (ZOLOFT) 50 MG tablet Take 1 tablet by mouth Daily. 90 tablet 1      No facility-administered medications prior to visit.       No opioid medication identified on active medication list. I have reviewed chart for other potential  high risk medication/s and harmful drug interactions in the elderly.          Aspirin is not on active medication list.  Aspirin use is not indicated based on review of current medical condition/s. Risk of harm outweighs potential benefits.  .    Patient Active Problem List   Diagnosis   • Spinal stenosis of lumbar region   • Essential hypertension   • Lower urinary tract symptoms (LUTS)   • Familial hypercholesterolemia   • Recurrent major depressive disorder, in full remission (HCC)   • Hyperplastic colon polyp   • Gastroesophageal reflux disease without esophagitis   • Subclinical hypothyroidism   • Severe ROMINA on auto CPAP   • Hypoxemia associated with sleep   • Bilateral renal cysts   • Class 2 severe obesity due to excess calories with serious comorbidity and body mass index (BMI) of 37.0 to 37.9 in adult (HCC)   • BPH with obstruction/lower urinary tract symptoms   • Colon polyp   • Iron deficiency anemia   • History of acute gastritis   • Castellon's esophagus with dysplasia   • Heme positive stool     Advance Care Planning  Advance Directive is on file.  ACP discussion was held with the patient during this visit. Patient has an advance directive in EMR which is still valid.      Objective    Vitals:    01/10/23 1113   BP: 140/70   Pulse: 56   Temp: 97.8 °F (36.6 °C)   TempSrc: Temporal   SpO2: 97%   Weight: 107 kg (235 lb 4.8 oz)   Height: 167.6 cm (65.98\")     Estimated body mass index is 38 kg/m² as calculated from the following:    Height as of this encounter: 167.6 cm (65.98\").    Weight as of this encounter: 107 kg (235 lb 4.8 oz).    Class 2 Severe Obesity (BMI >=35 and <=39.9). Obesity-related health conditions include the following: hypertension. Obesity is unchanged. BMI is is above average; BMI management plan is completed. We discussed  portion control and increasing exercise.      Does the patient have evidence of cognitive impairment? No    Lab Results   Component Value Date    CHLPL 152 2022    TRIG 105 2022    HDL 59 2022    LDL 74 2022    VLDL 19 2022        HEALTH RISK ASSESSMENT    Smoking Status:  Social History     Tobacco Use   Smoking Status Former   • Packs/day: 2.00   • Years: 14.00   • Pack years: 28.00   • Types: Cigarettes   • Quit date: 1980   • Years since quittin.0   Smokeless Tobacco Never   Tobacco Comments    He smoked between the ages of 18 and 32.     Alcohol Consumption:  Social History     Substance and Sexual Activity   Alcohol Use Yes   • Alcohol/week: 4.0 standard drinks   • Types: 4 Glasses of wine per week    Comment: occ.     Fall Risk Screen:    BRISA Fall Risk Assessment was completed, and patient is at LOW risk for falls.Assessment completed on:1/10/2023    Depression Screening:  PHQ-2/PHQ-9 Depression Screening 2023   Little Interest or Pleasure in Doing Things 0-->not at all   Feeling Down, Depressed or Hopeless 0-->not at all   Trouble Falling or Staying Asleep, or Sleeping Too Much 0-->not at all   Feeling Tired or Having Little Energy 0-->not at all   Poor Appetite or Overeating 0-->not at all   Feeling Bad about Yourself - or that You are a Failure or Have Let Yourself or Your Family Down 0-->not at all   Trouble Concentrating on Things, Such as Reading the Newspaper or Watching Television 0-->not at all   Moving or Speaking So Slowly that Other People Could Have Noticed? Or the Opposite - Being So Fidgety 0-->not at all   Thoughts that You Would be Better Off Dead or of Hurting Yourself in Some Way 0-->not at all   PHQ-9: Brief Depression Severity Measure Score 0       Health Habits and Functional and Cognitive Screening:  Functional & Cognitive Status 1/10/2023   Do you have difficulty preparing food and eating? No   Do you have difficulty bathing yourself,  getting dressed or grooming yourself? No   Do you have difficulty using the toilet? No   Do you have difficulty moving around from place to place? No   Do you have trouble with steps or getting out of a bed or a chair? No   Current Diet Limited Junk Food   Dental Exam Up to date   Eye Exam Up to date   Exercise (times per week) 4 times per week   Current Exercises Include Walking   Current Exercise Activities Include -   Do you need help using the phone?  No   Are you deaf or do you have serious difficulty hearing?  No   Do you need help with transportation? No   Do you need help shopping? No   Do you need help preparing meals?  No   Do you need help with housework?  No   Do you need help with laundry? No   Do you need help taking your medications? No   Do you need help managing money? No   Do you ever drive or ride in a car without wearing a seat belt? No   Have you felt unusual stress, anger or loneliness in the last month? No   Who do you live with? Spouse   If you need help, do you have trouble finding someone available to you? No   Have you been bothered in the last four weeks by sexual problems? No   Do you have difficulty concentrating, remembering or making decisions? No       Age-appropriate Screening Schedule:  Refer to the list below for future screening recommendations based on patient's age, sex and/or medical conditions. Orders for these recommended tests are listed in the plan section. The patient has been provided with a written plan.    Health Maintenance   Topic Date Due   • TDAP/TD VACCINES (1 - Tdap) Never done   • LIPID PANEL  12/05/2023   • INFLUENZA VACCINE  Completed   • ZOSTER VACCINE  Completed                CMS Preventative Services Quick Reference  Risk Factors Identified During Encounter  Inactivity/Sedentary: Patient was advised to exercise at least 150 minutes a week per CDC recommendations. and Patient was advised to do at least 150 minutes a week of moderate intensity activity such  as brisk walking and do at least 2 days a week of activities that strengthen muscles such as resistance training and do activities to improve balance such as standing on one foot about 3 days a week.  The above risks/problems have been discussed with the patient.  Pertinent information has been shared with the patient in the After Visit Summary.  An After Visit Summary and PPPS were made available to the patient.    Follow Up:   Next Medicare Wellness visit to be scheduled in 1 year.       Additional E&M Note during same encounter follows:  Patient has multiple medical problems which are significant and separately identifiable that require additional work above and beyond the Medicare Wellness Visit.      Chief Complaint  Medicare Wellness-subsequent and Shoulder Pain (Bilateral shoulder pain x 1 month )    Subjective        HPI  Dick Galindo is also being seen today for shoulder pain, hypertension, hyperlipidemia, major depression in remission, and hypothyroidism.    Shoulder pain.  About a month.  Bilateral.  Achy.  More of a nuisance than a problem.  Bothers him at night sometimes.  Appears to be bilateral.  He gets also some low back pain at times that radiates around to his bilateral hips lower abdomen area.  No major fatigue.  No brain fog.  And he otherwise feels well.    Anemia.  Resolved.  Iron and ferritin levels are normal.  His EGD and colonoscopy was unrevealing.  He also had a capsule swallowing study that was reportedly normal.    Major depression.  In remission.  Continues on maintenance sertraline 50 mg a day.  Patient would like to continue taking it.    Hypothyroidism.  He continues on levothyroxine 75 mcg a day.  His TSH was therapeutic about a month ago.    Hypertension.  Blood pressure somewhat elevated today.  He continues on amlodipine carvedilol and hydrochlorothiazide and lisinopril.  His sodium potassium and creatinine is normal.         Objective   Vital Signs:  /70   Pulse 56    Temp 97.8 °F (36.6 °C) (Temporal)   Ht 167.6 cm (65.98\")   Wt 107 kg (235 lb 4.8 oz)   SpO2 97%   BMI 38.00 kg/m²     Physical Exam  Constitutional:       Appearance: Normal appearance.   HENT:      Head: Atraumatic.      Mouth/Throat:      Pharynx: Oropharynx is clear. No oropharyngeal exudate or posterior oropharyngeal erythema.   Eyes:      Conjunctiva/sclera: Conjunctivae normal.   Neck:      Thyroid: No thyroid mass, thyromegaly or thyroid tenderness.   Cardiovascular:      Rate and Rhythm: Normal rate and regular rhythm.      Pulses: Normal pulses.      Heart sounds: Normal heart sounds.   Pulmonary:      Effort: Pulmonary effort is normal.      Breath sounds: Normal breath sounds.   Abdominal:      General: Abdomen is flat. There is no distension.      Palpations: Abdomen is soft. There is no mass.      Tenderness: There is no abdominal tenderness.      Hernia: No hernia is present.   Musculoskeletal:         General: Normal range of motion.      Cervical back: Normal range of motion and neck supple. No muscular tenderness.      Comments: He has an excellent nonpainful range of motion of the bilateral shoulders including internal and external rotation.  No pain to palpation.   Lymphadenopathy:      Cervical: No cervical adenopathy.   Skin:     General: Skin is warm and dry.      Findings: No rash.   Neurological:      General: No focal deficit present.      Mental Status: He is alert and oriented to person, place, and time.   Psychiatric:         Mood and Affect: Mood normal.          The following data was reviewed by: Mynor Rsus MD on 01/10/2023:  Common labs    Common Labs 8/11/22 12/5/22 12/5/22 12/5/22 1/5/23     0936 [id number] 0936    Glucose   100 (A)     BUN   19     Creatinine   1.06     Sodium   144     Potassium   4.4     Chloride   102     Calcium   9.7     Total Protein   7.2     Albumin   4.20     Total Bilirubin   0.7     Alkaline Phosphatase   84     AST (SGOT)   24     ALT (SGPT)   23      WBC 5.92 6.41   7.40   Hemoglobin 14.0 14.8   14.6   Hematocrit 42.3 44.9   45.3   Platelets 206 248   231   Total Cholesterol    152    Triglycerides    105    HDL Cholesterol    59    LDL Cholesterol     74    (A) Abnormal value       Comments are available for some flowsheets but are not being displayed.                      Assessment and Plan   Diagnoses and all orders for this visit:    1. Medicare annual wellness visit, subsequent (Primary)    2. Essential hypertension  -     CBC & Differential; Future  -     Comprehensive Metabolic Panel; Future  -     Lipid Panel; Future  -     TSH Rfx On Abnormal To Free T4; Future    3. Familial hypercholesterolemia  -     CBC & Differential; Future  -     Comprehensive Metabolic Panel; Future  -     Lipid Panel; Future  -     TSH Rfx On Abnormal To Free T4; Future    4. Subclinical hypothyroidism  -     CBC & Differential; Future  -     Comprehensive Metabolic Panel; Future  -     Lipid Panel; Future  -     TSH Rfx On Abnormal To Free T4; Future    5. Recurrent major depressive disorder, in full remission (HCC)  -     CBC & Differential; Future  -     Comprehensive Metabolic Panel; Future  -     Lipid Panel; Future  -     TSH Rfx On Abnormal To Free T4; Future      Hypertension.  Likely well-controlled.  I have asked him to start checking his blood pressure at home.  Continue current medication.  He is had some relative bradycardia related to the carvedilol.  Asymptomatic.    Hyperlipidemia.  Continue statin.    Subclinical hypothyroidism.  Check TSH prior to next visit.  Continue levothyroxine as is.    Major depression.  In remission.  Continue sertraline maintenance therapy.    Likely musculoskeletal shoulder pain.  Probably not PMR or other cause.  At this time I recommend observation.  Tylenol for pain.  Follow-up with Ortho or sports medicine if not improving.    See me in 6 months.  Sooner as needed.    Iron deficiency anemia, resolved.         Follow Up   No  follow-ups on file.  Patient was given instructions and counseling regarding his condition or for health maintenance advice. Please see specific information pulled into the AVS if appropriate.

## 2023-01-10 NOTE — PATIENT INSTRUCTIONS
You are due for adacel Tdap vaccination. (provides protection against tetanus, diptheria and whooping cough) Please  get the immunization at your local pharmacy at your earliest convenience. This immunization will next be due in 10 years. Please click on the link for more information about this vaccine.    [facility] Tdap Vaccine Information      Medicare Wellness  Personal Prevention Plan of Service     Date of Office Visit:    Encounter Provider:  Mynor Russ MD  Place of Service:  [facility]  Patient Name: Dick Galindo  :  1945    As part of the Medicare Wellness portion of your visit today, we are providing you with this personalized preventive plan of services (PPPS). This plan is based upon recommendations of the [place] Preventive Services Task Force (USPSTF) and the Advisory Committee on Immunization Practices (ACIP).    This lists the preventive care services that should be considered, and provides dates of when you are due. Items listed as completed are up-to-date and do not require any further intervention.    Health Maintenance   Topic Date Due   • TDAP/TD VACCINES (1 - Tdap) Never done   • COVID-19 Vaccine (4 - Booster for Pfizer series) 2022   • ANNUAL WELLNESS VISIT  2022   • LIPID PANEL  2023   • GASTROSCOPY (EGD)  2024   • COLORECTAL CANCER SCREENING  2025   • HEPATITIS C SCREENING  Completed   • INFLUENZA VACCINE  Completed   • Pneumococcal Vaccine 65+  Completed   • AAA SCREEN (ONE-TIME)  Completed   • ZOSTER VACCINE  Completed       No orders of the defined types were placed in this encounter.      No follow-ups on file.        You are due for adacel Tdap vaccination. (provides protection against tetanus, diptheria and whooping cough) Please  get the immunization at your local pharmacy at your earliest convenience. This immunization will next be due in 10 years. Please click on the link for more information about this  vaccine.    [facility] Tdap Vaccine Information      Medicare Wellness  Personal Prevention Plan of Service     Date of Office Visit:    Encounter Provider:  Mynor Russ MD  Place of Service:  [facility]  Patient Name: Dick Galindo  :  1945    As part of the Medicare Wellness portion of your visit today, we are providing you with this personalized preventive plan of services (PPPS). This plan is based upon recommendations of the [place] Preventive Services Task Force (USPSTF) and the Advisory Committee on Immunization Practices (ACIP).    This lists the preventive care services that should be considered, and provides dates of when you are due. Items listed as completed are up-to-date and do not require any further intervention.    Health Maintenance   Topic Date Due   • TDAP/TD VACCINES (1 - Tdap) Never done   • COVID-19 Vaccine (4 - Booster for Pfizer series) 2022   • ANNUAL WELLNESS VISIT  2022   • LIPID PANEL  2023   • GASTROSCOPY (EGD)  2024   • COLORECTAL CANCER SCREENING  2025   • HEPATITIS C SCREENING  Completed   • INFLUENZA VACCINE  Completed   • Pneumococcal Vaccine 65+  Completed   • AAA SCREEN (ONE-TIME)  Completed   • ZOSTER VACCINE  Completed       No orders of the defined types were placed in this encounter.      No follow-ups on file.

## 2023-03-06 RX ORDER — LISINOPRIL 40 MG/1
40 TABLET ORAL DAILY
Qty: 90 TABLET | Refills: 1 | Status: SHIPPED | OUTPATIENT
Start: 2023-03-06

## 2023-03-06 RX ORDER — ATORVASTATIN CALCIUM 40 MG/1
40 TABLET, FILM COATED ORAL DAILY
Qty: 90 TABLET | Refills: 1 | Status: SHIPPED | OUTPATIENT
Start: 2023-03-06

## 2023-03-06 NOTE — TELEPHONE ENCOUNTER
Rx Refill Note  Requested Prescriptions     Pending Prescriptions Disp Refills   • atorvastatin (LIPITOR) 40 MG tablet 90 tablet 1     Sig: Take 1 tablet by mouth Daily.   • lisinopril (PRINIVIL,ZESTRIL) 40 MG tablet 90 tablet 1     Sig: Take 1 tablet by mouth Daily.      Last office visit with prescribing clinician: 1/10/2023   Last telemedicine visit with prescribing clinician: 7/10/2023   Next office visit with prescribing clinician: 7/10/2023                         Would you like a call back once the refill request has been completed: [] Yes [] No    If the office needs to give you a call back, can they leave a voicemail: [] Yes [] No    Sarath Friend  03/06/23, 11:58 EST

## 2023-03-30 ENCOUNTER — LAB (OUTPATIENT)
Dept: OTHER | Facility: HOSPITAL | Age: 78
End: 2023-03-30
Payer: MEDICARE

## 2023-03-30 ENCOUNTER — OFFICE VISIT (OUTPATIENT)
Dept: ONCOLOGY | Facility: CLINIC | Age: 78
End: 2023-03-30
Payer: MEDICARE

## 2023-03-30 VITALS
DIASTOLIC BLOOD PRESSURE: 76 MMHG | RESPIRATION RATE: 18 BRPM | OXYGEN SATURATION: 96 % | HEART RATE: 57 BPM | HEIGHT: 66 IN | TEMPERATURE: 98.2 F | WEIGHT: 235.2 LBS | SYSTOLIC BLOOD PRESSURE: 121 MMHG | BODY MASS INDEX: 37.8 KG/M2

## 2023-03-30 DIAGNOSIS — D50.0 IRON DEFICIENCY ANEMIA DUE TO CHRONIC BLOOD LOSS: ICD-10-CM

## 2023-03-30 DIAGNOSIS — D50.0 IRON DEFICIENCY ANEMIA DUE TO CHRONIC BLOOD LOSS: Primary | ICD-10-CM

## 2023-03-30 LAB
BASOPHILS # BLD AUTO: 0.08 10*3/MM3 (ref 0–0.2)
BASOPHILS NFR BLD AUTO: 1 % (ref 0–1.5)
DEPRECATED RDW RBC AUTO: 39.2 FL (ref 37–54)
EOSINOPHIL # BLD AUTO: 0.23 10*3/MM3 (ref 0–0.4)
EOSINOPHIL NFR BLD AUTO: 2.9 % (ref 0.3–6.2)
ERYTHROCYTE [DISTWIDTH] IN BLOOD BY AUTOMATED COUNT: 11.9 % (ref 12.3–15.4)
FERRITIN SERPL-MCNC: 112.3 NG/ML (ref 30–400)
HCT VFR BLD AUTO: 41.4 % (ref 37.5–51)
HGB BLD-MCNC: 14.3 G/DL (ref 13–17.7)
IMM GRANULOCYTES # BLD AUTO: 0.11 10*3/MM3 (ref 0–0.05)
IMM GRANULOCYTES NFR BLD AUTO: 1.4 % (ref 0–0.5)
IRON 24H UR-MRATE: 79 MCG/DL (ref 59–158)
IRON SATN MFR SERPL: 27 % (ref 20–50)
LYMPHOCYTES # BLD AUTO: 1.95 10*3/MM3 (ref 0.7–3.1)
LYMPHOCYTES NFR BLD AUTO: 24.5 % (ref 19.6–45.3)
MCH RBC QN AUTO: 30.9 PG (ref 26.6–33)
MCHC RBC AUTO-ENTMCNC: 34.5 G/DL (ref 31.5–35.7)
MCV RBC AUTO: 89.4 FL (ref 79–97)
MONOCYTES # BLD AUTO: 0.68 10*3/MM3 (ref 0.1–0.9)
MONOCYTES NFR BLD AUTO: 8.5 % (ref 5–12)
NEUTROPHILS NFR BLD AUTO: 4.92 10*3/MM3 (ref 1.7–7)
NEUTROPHILS NFR BLD AUTO: 61.7 % (ref 42.7–76)
NRBC BLD AUTO-RTO: 0 /100 WBC (ref 0–0.2)
PLATELET # BLD AUTO: 258 10*3/MM3 (ref 140–450)
PMV BLD AUTO: 8.5 FL (ref 6–12)
RBC # BLD AUTO: 4.63 10*6/MM3 (ref 4.14–5.8)
TIBC SERPL-MCNC: 289 MCG/DL (ref 298–536)
TRANSFERRIN SERPL-MCNC: 194 MG/DL (ref 200–360)
WBC NRBC COR # BLD: 7.97 10*3/MM3 (ref 3.4–10.8)

## 2023-03-30 PROCEDURE — 3074F SYST BP LT 130 MM HG: CPT | Performed by: INTERNAL MEDICINE

## 2023-03-30 PROCEDURE — 3078F DIAST BP <80 MM HG: CPT | Performed by: INTERNAL MEDICINE

## 2023-03-30 PROCEDURE — 1160F RVW MEDS BY RX/DR IN RCRD: CPT | Performed by: INTERNAL MEDICINE

## 2023-03-30 PROCEDURE — 83540 ASSAY OF IRON: CPT | Performed by: INTERNAL MEDICINE

## 2023-03-30 PROCEDURE — 99214 OFFICE O/P EST MOD 30 MIN: CPT | Performed by: INTERNAL MEDICINE

## 2023-03-30 PROCEDURE — 85025 COMPLETE CBC W/AUTO DIFF WBC: CPT | Performed by: INTERNAL MEDICINE

## 2023-03-30 PROCEDURE — 1126F AMNT PAIN NOTED NONE PRSNT: CPT | Performed by: INTERNAL MEDICINE

## 2023-03-30 PROCEDURE — 82728 ASSAY OF FERRITIN: CPT | Performed by: INTERNAL MEDICINE

## 2023-03-30 PROCEDURE — 84466 ASSAY OF TRANSFERRIN: CPT | Performed by: INTERNAL MEDICINE

## 2023-03-30 PROCEDURE — 36415 COLL VENOUS BLD VENIPUNCTURE: CPT

## 2023-03-30 PROCEDURE — 1159F MED LIST DOCD IN RCRD: CPT | Performed by: INTERNAL MEDICINE

## 2023-03-30 NOTE — PROGRESS NOTES
Subjective   Dick Galindo is a 77 y.o. male.  Referred by Dr. Rodriguez for evaluation and management of anemia    History of Present Illness   Mr. Dubois is a 75-year-old  male with history of hypertension, hyperlipidemia, BPH status post TURP on 7/7/2020 presents for further evaluation of anemia.  He was scheduled for the TURP procedure on 7/7/2020 at which point a CBC was checked and his hemoglobin was noted to be low at 9.2.  This prompted further work-up with vitamin B12, folic acid, iron studies.  The vitamin B12 and folic acid levels were noted to be normal.  Iron studies showed a low ferritin consistent with iron deficiency.  He was then placed on oral iron which he has been taking for the past 2 weeks.  Today a repeat CBC shows an improvement in hemoglobin to 11.2.  He denies any lightheadedness, dizziness, chest pain, dyspnea, palpitations, lower extremity edema.  He reports having lost about 10 pounds over the past 2 weeks, he attributes this to changes in his diet as well as decreased appetite since the surgery.  He denies noting any blood in his stool any melena.  Denies any hematemesis.  He has been on Prevacid for a long time now for GERD.  His last colonoscopy was in 2018 on which there was diverticulosis, polyps and internal hemorrhoids noted.  EGD was also performed in 2018 on which mildly erythematous mucosa of the esophagus was noted but no other abnormalities.  He was scheduled for a colonoscopy in February 2020 by Dr. Roper however this has been delayed because of COVID and he plans to undergo colonoscopy soon.  6/3/2021 EGD-Z-line is irregular, 39 cm from the incisors.  Biopsied  Erythematous mucosa in the stomach biopsied.  No gross lesions in the entire examined duodenum.  Stomach biopsies consistent with scattered changes suggestive of mild reactive gastropathy.  GE junction biopsy with mild esophagitis and focal goblet cell metaplasia Castellon's esophagus.    Interval  history  Presents today for follow-up.  Patient denies any problems at this time  He had a capsule endoscopy November 2022 with GI, which was negative.  Denies any blood in stool or dark-colored stools.  He has been taking iron every other day      The following portions of the patient's history were reviewed and updated as appropriate: allergies, current medications, past family history, past medical history, past social history, past surgical history and problem list.    Past Medical History:   Diagnosis Date   • Anxiety    • Asthma     mild case, allergies   • Castellon esophagus    • Bronchitis    • Colon polyps    • Diverticulitis    • Enlarged prostate    • GERD (gastroesophageal reflux disease)    • H/O bladder infections    • H/O bronchitis    • High cholesterol    • History of anemia    • Hypertension    • Hypothyroidism    • Seasonal allergies    • Sleep apnea     CPAP   • Slow urinary stream         Past Surgical History:   Procedure Laterality Date   • COLONOSCOPY N/A approx 2012   • COLONOSCOPY N/A 02/06/2018    Procedure: COLONOSCOPY INTO CEECUM AND TERMINAL MILEUM WITH COLD BIOPSY POLYPECTOMIES & COLD BIOIPSIES;  Surgeon: Usman Roper MD;  Location: [facility] ENDOSCOPY;  Service:    • COLONOSCOPY N/A 12/03/2020    Procedure: COLONOSCOPY TO CECUM AND INTO TI WITH COLD BIOPSIES AND COLD BIOPSY POLYPECTOMY;  Surgeon: Usman Roper MD;  Location: [facility] ENDOSCOPY;  Service: Gastroenterology;  Laterality: N/A;  pre: history of colon polyps  post: diverticulosis, polyp, and internal hemorrhoids   • COLONOSCOPY N/A 5/31/2022    Procedure: COLONOSCOPY to cecum and TI with biopsies and cold forcep polypectomies;  Surgeon: Usman Roper MD;  Location: [facility] ENDOSCOPY;  Service: Gastroenterology;  Laterality: N/A;  pre- hx of colon polyps, melena, iron deficiency anemia  post- diverticulosis, polyps, polyp stalk, internal hemorrhoids   • CYSTOSCOPY TRANSURETHRAL RESECTION OF PROSTATE N/A 07/06/2020    Procedure:  TRANSURETHRAL RESECTION OF PROSTATE;  Surgeon: Charanjit Cameron MD;  Location: [facility] MAIN OR;  Service: Urology;  Laterality: N/A;   • ENDOSCOPY N/A 2018    Procedure: ESOPHAGOGASTRODUODENOSCOPY WITH COLD BIOPSIES;  Surgeon: Usman Roper MD;  Location: [facility] ENDOSCOPY;  Service:    • ENDOSCOPY N/A 2021    Procedure: ESOPHAGOGASTRODUODENOSCOPY WITH COLD BIOPSIES;  Surgeon: Usman Roper MD;  Location: [facility] ENDOSCOPY;  Service: Gastroenterology;  Laterality: N/A;  PRE:IRON DEFICIENCY ANEMIA  POST: GASTRITIS   • ENDOSCOPY N/A 2022    Procedure: ESOPHAGOGASTRODUODENOSCOPY with biopsies;  Surgeon: Usman Roper MD;  Location: [facility] ENDOSCOPY;  Service: Gastroenterology;  Laterality: N/A;  pre- Castellon's esophagus  post- gastritis, gastric polyps   • TONSILLECTOMY Bilateral    • UPPER GASTROINTESTINAL ENDOSCOPY          Family History   Problem Relation Age of Onset   • Stroke Father    • Heart attack Father    • Hearing loss Father    • Hyperlipidemia Father    • Hypertension Mother    • No Known Problems Maternal Grandmother    • No Known Problems Maternal Grandfather    • No Known Problems Paternal Grandmother    • No Known Problems Paternal Grandfather    • Heart disease Brother    • Malig Hyperthermia Neg Hx    • Colon cancer Neg Hx    • Colon polyps Neg Hx         Social History     Socioeconomic History   • Marital status:    Tobacco Use   • Smoking status: Former     Packs/day: 2.00     Years: 14.00     Pack years: 28.00     Types: Cigarettes     Quit date: 1980     Years since quittin.2   • Smokeless tobacco: Never   • Tobacco comments:     He smoked between the ages of 18 and 32.   Vaping Use   • Vaping Use: Never used   Substance and Sexual Activity   • Alcohol use: Yes     Alcohol/week: 4.0 standard drinks     Types: 4 Glasses of wine per week     Comment: occ.   • Drug use: No   • Sexual activity: Yes     Partners: Female             Allergies   Allergen  "Reactions   • Amoxicillin Itching and Swelling   • Levaquin [Levofloxacin] Nausea And Vomiting      Review of systems as mentioned in the HPI    Objective   Blood pressure 121/76, pulse 57, temperature 98.2 °F (36.8 °C), temperature source Temporal, resp. rate 18, height 167.6 cm (65.98\"), weight 107 kg (235 lb 3.2 oz), SpO2 96 %.     Physical Exam   Constitutional: He is oriented to person, place, and time. He appears well-developed and well-nourished. He does not appear ill. No distress.   HENT:   Head: Normocephalic.   Nose: Nose normal.   Mouth/Throat: Oropharynx is clear and moist. No oropharyngeal exudate.   Eyes: Pupils are equal, round, and reactive to light. Conjunctivae are normal.   Neck: No JVD present. No tracheal deviation present. No thyromegaly present.   Cardiovascular: Normal rate, regular rhythm and normal heart sounds.   Pulmonary/Chest: Effort normal and breath sounds normal.   Abdominal: Soft. Bowel sounds are normal.   Musculoskeletal: Normal range of motion.   Neurological: He is alert and oriented to person, place, and time. He displays no weakness.   Skin: Skin is warm and dry. No rash noted.   Psychiatric: His behavior is normal. Mood, judgment and thought content normal.   Vitals reviewed.    I have reexamined the patient and the results are consistent with the previously documented exam. Harika Hein MD     Lab on 03/30/2023   Component Date Value Ref Range Status   • WBC 03/30/2023 7.97  3.40 - 10.80 10*3/mm3 Final   • RBC 03/30/2023 4.63  4.14 - 5.80 10*6/mm3 Final   • Hemoglobin 03/30/2023 14.3  13.0 - 17.7 g/dL Final   • Hematocrit 03/30/2023 41.4  37.5 - 51.0 % Final   • MCV 03/30/2023 89.4  79.0 - 97.0 fL Final   • MCH 03/30/2023 30.9  26.6 - 33.0 pg Final   • MCHC 03/30/2023 34.5  31.5 - 35.7 g/dL Final   • RDW 03/30/2023 11.9 (L)  12.3 - 15.4 % Final   • RDW-SD 03/30/2023 39.2  37.0 - 54.0 fl Final   • MPV 03/30/2023 8.5  6.0 - 12.0 fL Final   • Platelets 03/30/2023 258  140 " - 450 10*3/mm3 Final   • Neutrophil % 03/30/2023 61.7  42.7 - 76.0 % Final   • Lymphocyte % 03/30/2023 24.5  19.6 - 45.3 % Final   • Monocyte % 03/30/2023 8.5  5.0 - 12.0 % Final   • Eosinophil % 03/30/2023 2.9  0.3 - 6.2 % Final   • Basophil % 03/30/2023 1.0  0.0 - 1.5 % Final   • Immature Grans % 03/30/2023 1.4 (H)  0.0 - 0.5 % Final   • Neutrophils, Absolute 03/30/2023 4.92  1.70 - 7.00 10*3/mm3 Final   • Lymphocytes, Absolute 03/30/2023 1.95  0.70 - 3.10 10*3/mm3 Final   • Monocytes, Absolute 03/30/2023 0.68  0.10 - 0.90 10*3/mm3 Final   • Eosinophils, Absolute 03/30/2023 0.23  0.00 - 0.40 10*3/mm3 Final   • Basophils, Absolute 03/30/2023 0.08  0.00 - 0.20 10*3/mm3 Final   • Immature Grans, Absolute 03/30/2023 0.11 (H)  0.00 - 0.05 10*3/mm3 Final   • nRBC 03/30/2023 0.0  0.0 - 0.2 /100 WBC Final        No radiology results for the last 30 days.     4/14/2022  CBC-WBC 5.8, hemoglobin 12.1 which is a decrease from 14 4 months ago, platelets 227  Ferritin low at 15.7  Iron studies show low iron saturation 10%, transferrin 282, TIBC 420    1/5/2023  CBC-within normal limits    Assessment & Plan      Mr. Holder is a 76-year-old  male referred for further work-up of anemia.  On reviewing the labs he is noted to have low hemoglobin since May 2019.  Ferritin was normal in the past however most recent ferritin from 7/8/2020 noted to be low at 12.  This is indicative of iron deficiency.  He has been on oral iron and has had a good response.    1.anemia  · Work-up consistent with iron deficiency   · Had a colonoscopy in December 2020 which does not show any evidence of bleeding  · EGD June 2021 with esophagitis and Castellon's esophagus.  · He had some hemorrhoidal bleeding with stopped about 3 months ago.  · He continues on oral iron every other day.  · Hemoglobin today is normal at 14.1.  · Ferritin normal at 36.1, iron profile shows an iron saturation of 21% and TIBC 337  · Has not been on oral iron for about 4  to 5 months now.  · Hemoglobin decreased to 12.1 and therefore oral iron resumed.  · Repeat EGD and C-scope 5/31/2022 without any evidence of bleeding  · Continue oral iron.  Recommend taking 2 daily.  · 1/5/2023: Taking oral iron 2 tablets every other day, tolerating well.  Hemoglobin today 14.6, ferritin 72.3, iron saturation 27%, TIBC 329.  Capsule endoscopy with GI 11/2022 came back normal, no source of anemia identified.  · 3/30/2023-CBC reviewed and WBC 7.97, hemoglobin 14.3 and platelets 215,000  · Iron saturation 25%, TIBC 289  · Ferritin 112.3  · Continue iron every other day    2.hypertension-blood pressure well controlled, continue current medications, blood pressure 121/76    3.GI work-up for iron deficiency anemia-with C-scope and EGD performed 5/31/2022 negative.  · Capsule endoscopy with GI 11/28/2022 came back normal, no source of anemia.  · Hemoglobin remains normal    4.hyperlipidemia , continue atorvastatin, stable    5.mood-continue Zoloft, stable    6.obesity-BMI 38    PLAN:   · Continue oral iron to 1 tablet every other day.  · Return in 6 months with CBC and iron studies    Harika Hein MD  03/30/23

## 2023-04-17 NOTE — TELEPHONE ENCOUNTER
Rx Refill Note  Requested Prescriptions     Pending Prescriptions Disp Refills   • sertraline (ZOLOFT) 50 MG tablet 90 tablet 1     Sig: Take 1 tablet by mouth Daily.      Last office visit with prescribing clinician: 1/10/2023   Last telemedicine visit with prescribing clinician: 7/3/2023   Next office visit with prescribing clinician: 7/10/2023                         Would you like a call back once the refill request has been completed: [] Yes [] No    If the office needs to give you a call back, can they leave a voicemail: [] Yes [] No    Sarath Friend  04/17/23, 12:58 EDT

## 2023-05-17 RX ORDER — HYDROCHLOROTHIAZIDE 12.5 MG/1
12.5 TABLET ORAL DAILY
Qty: 90 TABLET | Refills: 1 | Status: SHIPPED | OUTPATIENT
Start: 2023-05-17

## 2023-05-17 RX ORDER — LEVOTHYROXINE SODIUM 0.07 MG/1
75 TABLET ORAL DAILY
Qty: 90 TABLET | Refills: 1 | Status: SHIPPED | OUTPATIENT
Start: 2023-05-17

## 2023-05-17 RX ORDER — AMLODIPINE BESYLATE 10 MG/1
10 TABLET ORAL DAILY
Qty: 90 TABLET | Refills: 1 | Status: SHIPPED | OUTPATIENT
Start: 2023-05-17

## 2023-05-17 RX ORDER — CARVEDILOL 6.25 MG/1
6.25 TABLET ORAL 2 TIMES DAILY WITH MEALS
Qty: 180 TABLET | Refills: 1 | Status: SHIPPED | OUTPATIENT
Start: 2023-05-17

## 2023-05-17 NOTE — TELEPHONE ENCOUNTER
Rx Refill Note  Requested Prescriptions     Pending Prescriptions Disp Refills   • carvedilol (COREG) 6.25 MG tablet 180 tablet 1     Sig: Take 1 tablet by mouth 2 (Two) Times a Day With Meals.   • amLODIPine (NORVASC) 10 MG tablet 90 tablet 1     Sig: Take 1 tablet by mouth Daily.   • hydroCHLOROthiazide (HYDRODIURIL) 12.5 MG tablet 90 tablet 1     Sig: Take 1 tablet by mouth Daily.   • levothyroxine (SYNTHROID, LEVOTHROID) 75 MCG tablet 90 tablet 1     Sig: Take 1 tablet by mouth Daily.      Last office visit with prescribing clinician: 1/10/2023   Last telemedicine visit with prescribing clinician: 4/15/2023   Next office visit with prescribing clinician: 7/10/2023                         Would you like a call back once the refill request has been completed: [] Yes [] No    If the office needs to give you a call back, can they leave a voicemail: [] Yes [] No    Sarath Friend  05/17/23, 07:33 EDT

## 2023-06-15 ENCOUNTER — APPOINTMENT (OUTPATIENT)
Dept: GENERAL RADIOLOGY | Facility: HOSPITAL | Age: 78
End: 2023-06-15
Payer: MEDICARE

## 2023-06-15 ENCOUNTER — HOSPITAL ENCOUNTER (EMERGENCY)
Facility: HOSPITAL | Age: 78
Discharge: HOME | End: 2023-06-15
Attending: EMERGENCY MEDICINE
Payer: MEDICARE

## 2023-06-15 ENCOUNTER — APPOINTMENT (OUTPATIENT)
Dept: CT IMAGING | Facility: HOSPITAL | Age: 78
End: 2023-06-15
Payer: MEDICARE

## 2023-06-15 VITALS
HEART RATE: 58 BPM | BODY MASS INDEX: 37.97 KG/M2 | DIASTOLIC BLOOD PRESSURE: 68 MMHG | OXYGEN SATURATION: 97 % | HEIGHT: 66 IN | SYSTOLIC BLOOD PRESSURE: 150 MMHG | TEMPERATURE: 97.9 F | RESPIRATION RATE: 16 BRPM | WEIGHT: 236.3 LBS

## 2023-06-15 DIAGNOSIS — H81.10 BENIGN PAROXYSMAL POSITIONAL VERTIGO, UNSPECIFIED LATERALITY: Primary | ICD-10-CM

## 2023-06-15 LAB
ALBUMIN SERPL-MCNC: 4.3 G/DL (ref 3.5–5.2)
ALBUMIN/GLOB SERPL: 1.4 G/DL
ALP SERPL-CCNC: 78 U/L (ref 39–117)
ALT SERPL W P-5'-P-CCNC: 21 U/L (ref 1–41)
ANION GAP SERPL CALCULATED.3IONS-SCNC: 9 MMOL/L (ref 5–15)
AST SERPL-CCNC: 21 U/L (ref 1–40)
BASOPHILS # BLD AUTO: 0.05 10*3/MM3 (ref 0–0.2)
BASOPHILS NFR BLD AUTO: 0.6 % (ref 0–1.5)
BILIRUB SERPL-MCNC: 0.6 MG/DL (ref 0–1.2)
BUN SERPL-MCNC: 21 MG/DL (ref 8–23)
BUN/CREAT SERPL: 21.6 (ref 7–25)
CALCIUM SPEC-SCNC: 9.6 MG/DL (ref 8.6–10.5)
CHLORIDE SERPL-SCNC: 102 MMOL/L (ref 98–107)
CO2 SERPL-SCNC: 27 MMOL/L (ref 22–29)
CREAT SERPL-MCNC: 0.97 MG/DL (ref 0.76–1.27)
DEPRECATED RDW RBC AUTO: 41.7 FL (ref 37–54)
EGFRCR SERPLBLD CKD-EPI 2021: 81.4 ML/MIN/1.73
EOSINOPHIL # BLD AUTO: 0.12 10*3/MM3 (ref 0–0.4)
EOSINOPHIL NFR BLD AUTO: 1.5 % (ref 0.3–6.2)
ERYTHROCYTE [DISTWIDTH] IN BLOOD BY AUTOMATED COUNT: 12.4 % (ref 12.3–15.4)
GLOBULIN UR ELPH-MCNC: 3.1 GM/DL
GLUCOSE SERPL-MCNC: 114 MG/DL (ref 65–99)
HCT VFR BLD AUTO: 47 % (ref 37.5–51)
HGB BLD-MCNC: 15.9 G/DL (ref 13–17.7)
IMM GRANULOCYTES # BLD AUTO: 0 10*3/MM3 (ref 0–0.05)
IMM GRANULOCYTES NFR BLD AUTO: 0 % (ref 0–0.5)
LYMPHOCYTES # BLD AUTO: 1.47 10*3/MM3 (ref 0.7–3.1)
LYMPHOCYTES NFR BLD AUTO: 18.7 % (ref 19.6–45.3)
MCH RBC QN AUTO: 30.7 PG (ref 26.6–33)
MCHC RBC AUTO-ENTMCNC: 33.8 G/DL (ref 31.5–35.7)
MCV RBC AUTO: 90.7 FL (ref 79–97)
MONOCYTES # BLD AUTO: 0.65 10*3/MM3 (ref 0.1–0.9)
MONOCYTES NFR BLD AUTO: 8.3 % (ref 5–12)
NEUTROPHILS NFR BLD AUTO: 5.57 10*3/MM3 (ref 1.7–7)
NEUTROPHILS NFR BLD AUTO: 70.9 % (ref 42.7–76)
PLATELET # BLD AUTO: 244 10*3/MM3 (ref 140–450)
PMV BLD AUTO: 9.1 FL (ref 6–12)
POTASSIUM SERPL-SCNC: 4.1 MMOL/L (ref 3.5–5.2)
PROT SERPL-MCNC: 7.4 G/DL (ref 6–8.5)
QT INTERVAL: 427 MS
RBC # BLD AUTO: 5.18 10*6/MM3 (ref 4.14–5.8)
SODIUM SERPL-SCNC: 138 MMOL/L (ref 136–145)
WBC NRBC COR # BLD: 7.86 10*3/MM3 (ref 3.4–10.8)

## 2023-06-15 PROCEDURE — 71045 X-RAY EXAM CHEST 1 VIEW: CPT

## 2023-06-15 PROCEDURE — 80053 COMPREHEN METABOLIC PANEL: CPT | Performed by: EMERGENCY MEDICINE

## 2023-06-15 PROCEDURE — 70450 CT HEAD/BRAIN W/O DYE: CPT

## 2023-06-15 PROCEDURE — 85025 COMPLETE CBC W/AUTO DIFF WBC: CPT | Performed by: EMERGENCY MEDICINE

## 2023-06-15 PROCEDURE — 93005 ELECTROCARDIOGRAM TRACING: CPT | Performed by: EMERGENCY MEDICINE

## 2023-06-15 RX ORDER — MECLIZINE HYDROCHLORIDE 25 MG/1
25 TABLET ORAL ONCE
Status: COMPLETED | OUTPATIENT
Start: 2023-06-15 | End: 2023-06-15

## 2023-06-15 RX ORDER — MECLIZINE HYDROCHLORIDE 25 MG/1
25 TABLET ORAL 3 TIMES DAILY PRN
Qty: 15 TABLET | Refills: 0 | Status: SHIPPED | OUTPATIENT
Start: 2023-06-15

## 2023-06-15 RX ADMIN — MECLIZINE HYDROCHLORIDE 25 MG: 25 TABLET ORAL at 12:18

## 2023-06-15 NOTE — DISCHARGE INSTRUCTIONS
Use the meclizine as needed for the vertigo symptoms and follow-up with ENT if your symptoms or not improving.  Return for worsening symptoms or for any other concerns.

## 2023-06-15 NOTE — FSED PROVIDER NOTE
Subjective   History of Present Illness  77-year-old male presents with a chief complaint of dizziness.  He states that last night at about 6 PM he noticed that he felt dizzy.  He noted a sensation of spinning around him that is worse with changing positions.  He states that specifically gets worse when he sits up from a lying position or when he rolls over.  He denies any numbness, tingling, diplopia, weakness, slurred speech, or facial droop.  He states that when he is sitting still, he does not experience the dizziness.  No prior diagnosis of vertigo.  No history of stroke.  He denies chest pain, shortness of breath, vomiting, diarrhea, fever.  He does report chronic sinus issues.  Review of Systems   Constitutional:  Negative for fever.   Respiratory:  Negative for shortness of breath.    Cardiovascular:  Negative for chest pain.   Gastrointestinal:  Negative for diarrhea and vomiting.   Skin:  Negative for rash.   Neurological:  Positive for dizziness. Negative for speech difficulty, weakness and numbness.   All other systems reviewed and are negative.    Past Medical History:   Diagnosis Date    Anxiety     Asthma     mild case, allergies    Castellon esophagus     Bronchitis     Colon polyps     Diverticulitis     Enlarged prostate     GERD (gastroesophageal reflux disease)     H/O bladder infections     H/O bronchitis     High cholesterol     History of anemia     Hypertension     Hypothyroidism     Seasonal allergies     Sleep apnea     CPAP    Slow urinary stream        Allergies   Allergen Reactions    Amoxicillin Itching and Swelling    Levaquin [Levofloxacin] Nausea And Vomiting       Past Surgical History:   Procedure Laterality Date    COLONOSCOPY N/A approx 2012    COLONOSCOPY N/A 02/06/2018    Procedure: COLONOSCOPY INTO CEECUM AND TERMINAL MILEUM WITH COLD BIOPSY POLYPECTOMIES & COLD BIOIPSIES;  Surgeon: Usman Roper MD;  Location: [facility] ENDOSCOPY;  Service:     COLONOSCOPY N/A 12/03/2020     Procedure: COLONOSCOPY TO CECUM AND INTO TI WITH COLD BIOPSIES AND COLD BIOPSY POLYPECTOMY;  Surgeon: Usman Roper MD;  Location: [facility] ENDOSCOPY;  Service: Gastroenterology;  Laterality: N/A;  pre: history of colon polyps  post: diverticulosis, polyp, and internal hemorrhoids    COLONOSCOPY N/A 5/31/2022    Procedure: COLONOSCOPY to cecum and TI with biopsies and cold forcep polypectomies;  Surgeon: Usman Roper MD;  Location: [facility] ENDOSCOPY;  Service: Gastroenterology;  Laterality: N/A;  pre- hx of colon polyps, melena, iron deficiency anemia  post- diverticulosis, polyps, polyp stalk, internal hemorrhoids    CYSTOSCOPY TRANSURETHRAL RESECTION OF PROSTATE N/A 07/06/2020    Procedure: TRANSURETHRAL RESECTION OF PROSTATE;  Surgeon: Charanjit Cameron MD;  Location: [facility] MAIN OR;  Service: Urology;  Laterality: N/A;    ENDOSCOPY N/A 02/06/2018    Procedure: ESOPHAGOGASTRODUODENOSCOPY WITH COLD BIOPSIES;  Surgeon: Usman Roper MD;  Location: [facility] ENDOSCOPY;  Service:     ENDOSCOPY N/A 06/03/2021    Procedure: ESOPHAGOGASTRODUODENOSCOPY WITH COLD BIOPSIES;  Surgeon: Usman Roper MD;  Location: [facility] ENDOSCOPY;  Service: Gastroenterology;  Laterality: N/A;  PRE:IRON DEFICIENCY ANEMIA  POST: GASTRITIS    ENDOSCOPY N/A 5/31/2022    Procedure: ESOPHAGOGASTRODUODENOSCOPY with biopsies;  Surgeon: Usman Roper MD;  Location: [facility] ENDOSCOPY;  Service: Gastroenterology;  Laterality: N/A;  pre- Castellon's esophagus  post- gastritis, gastric polyps    TONSILLECTOMY Bilateral 1954    UPPER GASTROINTESTINAL ENDOSCOPY         Family History   Problem Relation Age of Onset    Stroke Father     Heart attack Father     Hearing loss Father     Hyperlipidemia Father     Hypertension Mother     No Known Problems Maternal Grandmother     No Known Problems Maternal Grandfather     No Known Problems Paternal Grandmother     No Known Problems Paternal Grandfather     Heart disease Brother     Malig Hyperthermia Neg Hx      Colon cancer Neg Hx     Colon polyps Neg Hx        Social History     Socioeconomic History    Marital status:    Tobacco Use    Smoking status: Former     Packs/day: 2.00     Years: 14.00     Pack years: 28.00     Types: Cigarettes     Quit date: 1980     Years since quittin.4    Smokeless tobacco: Never    Tobacco comments:     He smoked between the ages of 18 and 32.   Vaping Use    Vaping Use: Never used   Substance and Sexual Activity    Alcohol use: Yes     Alcohol/week: 4.0 standard drinks     Types: 4 Glasses of wine per week     Comment: occ.    Drug use: No    Sexual activity: Yes     Partners: Female           Objective   Physical Exam  Vitals reviewed.   Constitutional:       General: He is not in acute distress.     Appearance: He is well-developed. He is not ill-appearing.   Eyes:      Extraocular Movements: Extraocular movements intact.      Right eye: No nystagmus.      Left eye: No nystagmus.   Cardiovascular:      Rate and Rhythm: Normal rate and regular rhythm.      Heart sounds: Normal heart sounds.   Pulmonary:      Effort: No respiratory distress.      Breath sounds: Normal breath sounds.   Abdominal:      Palpations: Abdomen is soft.      Tenderness: There is no abdominal tenderness.   Musculoskeletal:         General: Normal range of motion.   Skin:     General: Skin is dry.   Neurological:      General: No focal deficit present.      Mental Status: He is alert.      Cranial Nerves: Cranial nerves 2-12 are intact.      Sensory: Sensation is intact.      Motor: Motor function is intact.      Coordination: Coordination is intact.      Gait: Gait is intact.   Psychiatric:         Mood and Affect: Mood normal.       Procedures           ED Course  ED Course as of 06/15/23 1322   Thu Andres 15, 2023   1320 Labs and imaging unremarkable.  Will discharge patient on meclizine and have him follow-up with ENT for vertigo. [DH]      ED Course User Index  [DH] Eleazar Bullard MD                                            Medical Decision Making  Amount and/or Complexity of Data Reviewed  Labs: ordered.  Radiology: ordered.  ECG/medicine tests: ordered.    77-year-old male presents with intermittent dizziness.  Patient has a normal neurologic exam, normal gait, and no neurologic symptoms such as slurred speech, facial droop, difficulty swallowing, diplopia, or paresthesias.  Dizziness is intermittent.  CVA or central cause of dizziness felt to be unlikely.  Symptoms are consistent with benign peripheral etiology of dizziness.  Given hypertension and hyperlipidemia, will check CT of the head and will also check basic lab work to assess for other etiologies of dizziness such as dehydration or anemia.  EKG to screen for arrhythmia.  Will give meclizine.    Final diagnoses:   Benign paroxysmal positional vertigo, unspecified laterality       ED Disposition  ED Disposition       ED Disposition   Discharge    Condition   Stable    Comment   --               Mynor Russ MD  [address]  [facility] [address]  [place] [id number] [phone]          Bob Luna MD  [address] [facility] [address]  [place] [id number] [phone]               Medication List        New Prescriptions      meclizine 25 MG tablet  Commonly known as: ANTIVERT  Take 1 tablet by mouth 3 (Three) Times a Day As Needed for Dizziness.               Where to Get Your Medications        These medications were sent to [facility] [id number] - [place], [place] - [address] [facility] - [phone] [facility] [phone]   [address], [facility] [address]      Phone: [phone]   meclizine 25 MG tablet

## 2023-09-05 RX ORDER — ATORVASTATIN CALCIUM 40 MG/1
40 TABLET, FILM COATED ORAL DAILY
Qty: 90 TABLET | Refills: 1 | Status: SHIPPED | OUTPATIENT
Start: 2023-09-05

## 2023-09-05 RX ORDER — LISINOPRIL 40 MG/1
40 TABLET ORAL DAILY
Qty: 90 TABLET | Refills: 1 | Status: SHIPPED | OUTPATIENT
Start: 2023-09-05

## 2023-09-05 NOTE — TELEPHONE ENCOUNTER
Rx Refill Note  Requested Prescriptions     Pending Prescriptions Disp Refills    atorvastatin (LIPITOR) 40 MG tablet 90 tablet 1     Sig: Take 1 tablet by mouth Daily.    lisinopril (PRINIVIL,ZESTRIL) 40 MG tablet 90 tablet 1     Sig: Take 1 tablet by mouth Daily.      Last office visit with prescribing clinician: 7/10/2023   Last telemedicine visit with prescribing clinician: Visit date not found   Next office visit with prescribing clinician: 1/15/2024                         Would you like a call back once the refill request has been completed: [] Yes [] No    If the office needs to give you a call back, can they leave a voicemail: [] Yes [] No    Sarath Friend  09/05/23, 09:51 EDT

## 2023-09-25 NOTE — TELEPHONE ENCOUNTER
Rx Refill Note  Requested Prescriptions     Pending Prescriptions Disp Refills    sertraline (ZOLOFT) 50 MG tablet [Pharmacy Med Name: SERTRALINE HCL 50 MG TABLET] 90 tablet 1     Sig: TAKE ONE TABLET BY MOUTH DAILY      Last office visit with prescribing clinician: 7/10/2023   Last telemedicine visit with prescribing clinician: Visit date not found   Next office visit with prescribing clinician: 1/15/2024                         Would you like a call back once the refill request has been completed: [] Yes [] No    If the office needs to give you a call back, can they leave a voicemail: [] Yes [] No    Sarath Friend  09/25/23, 08:04 EDT

## 2023-10-12 ENCOUNTER — LAB (OUTPATIENT)
Dept: OTHER | Facility: HOSPITAL | Age: 78
End: 2023-10-12
Payer: MEDICARE

## 2023-10-12 ENCOUNTER — TELEMEDICINE (OUTPATIENT)
Dept: ONCOLOGY | Facility: CLINIC | Age: 78
End: 2023-10-12
Payer: MEDICARE

## 2023-10-12 VITALS
BODY MASS INDEX: 37.86 KG/M2 | SYSTOLIC BLOOD PRESSURE: 148 MMHG | HEART RATE: 61 BPM | DIASTOLIC BLOOD PRESSURE: 80 MMHG | OXYGEN SATURATION: 97 % | RESPIRATION RATE: 18 BRPM | HEIGHT: 66 IN | WEIGHT: 235.6 LBS | TEMPERATURE: 98.6 F

## 2023-10-12 DIAGNOSIS — D50.0 IRON DEFICIENCY ANEMIA DUE TO CHRONIC BLOOD LOSS: Primary | ICD-10-CM

## 2023-10-12 DIAGNOSIS — D50.0 IRON DEFICIENCY ANEMIA DUE TO CHRONIC BLOOD LOSS: ICD-10-CM

## 2023-10-12 LAB
BASOPHILS # BLD AUTO: 0.07 10*3/MM3 (ref 0–0.2)
BASOPHILS NFR BLD AUTO: 0.9 % (ref 0–1.5)
DEPRECATED RDW RBC AUTO: 46.4 FL (ref 37–54)
EOSINOPHIL # BLD AUTO: 0.2 10*3/MM3 (ref 0–0.4)
EOSINOPHIL NFR BLD AUTO: 2.5 % (ref 0.3–6.2)
ERYTHROCYTE [DISTWIDTH] IN BLOOD BY AUTOMATED COUNT: 13.4 % (ref 12.3–15.4)
FERRITIN SERPL-MCNC: 73.2 NG/ML (ref 30–400)
HCT VFR BLD AUTO: 45.5 % (ref 37.5–51)
HGB BLD-MCNC: 14.7 G/DL (ref 13–17.7)
IMM GRANULOCYTES # BLD AUTO: 0.03 10*3/MM3 (ref 0–0.05)
IMM GRANULOCYTES NFR BLD AUTO: 0.4 % (ref 0–0.5)
IRON 24H UR-MRATE: 60 MCG/DL (ref 59–158)
IRON SATN MFR SERPL: 19 % (ref 20–50)
LYMPHOCYTES # BLD AUTO: 1.74 10*3/MM3 (ref 0.7–3.1)
LYMPHOCYTES NFR BLD AUTO: 21.3 % (ref 19.6–45.3)
MCH RBC QN AUTO: 30.3 PG (ref 26.6–33)
MCHC RBC AUTO-ENTMCNC: 32.3 G/DL (ref 31.5–35.7)
MCV RBC AUTO: 93.8 FL (ref 79–97)
MONOCYTES # BLD AUTO: 0.73 10*3/MM3 (ref 0.1–0.9)
MONOCYTES NFR BLD AUTO: 9 % (ref 5–12)
NEUTROPHILS NFR BLD AUTO: 5.38 10*3/MM3 (ref 1.7–7)
NEUTROPHILS NFR BLD AUTO: 65.9 % (ref 42.7–76)
NRBC BLD AUTO-RTO: 0 /100 WBC (ref 0–0.2)
PLATELET # BLD AUTO: 244 10*3/MM3 (ref 140–450)
PMV BLD AUTO: 8.5 FL (ref 6–12)
RBC # BLD AUTO: 4.85 10*6/MM3 (ref 4.14–5.8)
TIBC SERPL-MCNC: 319 MCG/DL (ref 298–536)
TRANSFERRIN SERPL-MCNC: 214 MG/DL (ref 200–360)
WBC NRBC COR # BLD: 8.15 10*3/MM3 (ref 3.4–10.8)

## 2023-10-12 PROCEDURE — 82728 ASSAY OF FERRITIN: CPT | Performed by: INTERNAL MEDICINE

## 2023-10-12 PROCEDURE — 84466 ASSAY OF TRANSFERRIN: CPT | Performed by: INTERNAL MEDICINE

## 2023-10-12 PROCEDURE — 83540 ASSAY OF IRON: CPT | Performed by: INTERNAL MEDICINE

## 2023-10-12 PROCEDURE — 36415 COLL VENOUS BLD VENIPUNCTURE: CPT

## 2023-10-12 PROCEDURE — 85025 COMPLETE CBC W/AUTO DIFF WBC: CPT | Performed by: INTERNAL MEDICINE

## 2023-10-12 NOTE — PROGRESS NOTES
Subjective   Dick Galindo is a 78 y.o. male.  Referred by Dr. Rodriguez for evaluation and management of anemia    This was an audio and video enabled telemedicine encounter.    History of Present Illness   Mr. Dubois is a 75-year-old  male with history of hypertension, hyperlipidemia, BPH status post TURP on 7/7/2020 presents for further evaluation of anemia.  He was scheduled for the TURP procedure on 7/7/2020 at which point a CBC was checked and his hemoglobin was noted to be low at 9.2.  This prompted further work-up with vitamin B12, folic acid, iron studies.  The vitamin B12 and folic acid levels were noted to be normal.  Iron studies showed a low ferritin consistent with iron deficiency.  He was then placed on oral iron which he has been taking for the past 2 weeks.  Today a repeat CBC shows an improvement in hemoglobin to 11.2.  He denies any lightheadedness, dizziness, chest pain, dyspnea, palpitations, lower extremity edema.  He reports having lost about 10 pounds over the past 2 weeks, he attributes this to changes in his diet as well as decreased appetite since the surgery.  He denies noting any blood in his stool any melena.  Denies any hematemesis.  He has been on Prevacid for a long time now for GERD.  His last colonoscopy was in 2018 on which there was diverticulosis, polyps and internal hemorrhoids noted.  EGD was also performed in 2018 on which mildly erythematous mucosa of the esophagus was noted but no other abnormalities.  He was scheduled for a colonoscopy in February 2020 by Dr. Roper however this has been delayed because of COVID and he plans to undergo colonoscopy soon.  6/3/2021 EGD-Z-line is irregular, 39 cm from the incisors.  Biopsied  Erythematous mucosa in the stomach biopsied.  No gross lesions in the entire examined duodenum.  Stomach biopsies consistent with scattered changes suggestive of mild reactive gastropathy.  GE junction biopsy with mild esophagitis and focal goblet  cell metaplasia Castellon's esophagus.    Interval history  Patient return for follow up today, continuing on oral iron one tablet three times daily.  He denies issues with bleeding, melena, or hematochezia.  He is overall tolerating the iron well with out any significant side effects.     The following portions of the patient's history were reviewed and updated as appropriate: allergies, current medications, past family history, past medical history, past social history, past surgical history and problem list.    Past Medical History:   Diagnosis Date    Anxiety     Asthma     mild case, allergies    Castellon esophagus     Bronchitis     Colon polyps     Diverticulitis     Enlarged prostate     GERD (gastroesophageal reflux disease)     H/O bladder infections     H/O bronchitis     High cholesterol     History of anemia     Hypertension     Hypothyroidism     Seasonal allergies     Sleep apnea     CPAP    Slow urinary stream         Past Surgical History:   Procedure Laterality Date    COLONOSCOPY N/A approx 2012    COLONOSCOPY N/A 02/06/2018    Procedure: COLONOSCOPY INTO CEECUM AND TERMINAL MILEUM WITH COLD BIOPSY POLYPECTOMIES & COLD BIOIPSIES;  Surgeon: Usman Roper MD;  Location: [facility] ENDOSCOPY;  Service:     COLONOSCOPY N/A 12/03/2020    Procedure: COLONOSCOPY TO CECUM AND INTO TI WITH COLD BIOPSIES AND COLD BIOPSY POLYPECTOMY;  Surgeon: Usman Roper MD;  Location: [facility] ENDOSCOPY;  Service: Gastroenterology;  Laterality: N/A;  pre: history of colon polyps  post: diverticulosis, polyp, and internal hemorrhoids    COLONOSCOPY N/A 05/31/2022    Procedure: COLONOSCOPY to cecum and TI with biopsies and cold forcep polypectomies;  Surgeon: Usman Roper MD;  Location: [facility] ENDOSCOPY;  Service: Gastroenterology;  Laterality: N/A;  pre- hx of colon polyps, melena, iron deficiency anemia  post- diverticulosis, polyps, polyp stalk, internal hemorrhoids    CYSTOSCOPY TRANSURETHRAL RESECTION OF PROSTATE N/A  2020    Procedure: TRANSURETHRAL RESECTION OF PROSTATE;  Surgeon: Charanjit Cameron MD;  Location: [facility] MAIN OR;  Service: Urology;  Laterality: N/A;    ENDOSCOPY N/A 2018    Procedure: ESOPHAGOGASTRODUODENOSCOPY WITH COLD BIOPSIES;  Surgeon: Usman Roper MD;  Location: [facility] ENDOSCOPY;  Service:     ENDOSCOPY N/A 2021    Procedure: ESOPHAGOGASTRODUODENOSCOPY WITH COLD BIOPSIES;  Surgeon: Usman Roper MD;  Location: [facility] ENDOSCOPY;  Service: Gastroenterology;  Laterality: N/A;  PRE:IRON DEFICIENCY ANEMIA  POST: GASTRITIS    ENDOSCOPY N/A 2022    Procedure: ESOPHAGOGASTRODUODENOSCOPY with biopsies;  Surgeon: Usman Roper MD;  Location: [facility] ENDOSCOPY;  Service: Gastroenterology;  Laterality: N/A;  pre- Castellon's esophagus  post- gastritis, gastric polyps    PROSTATE SURGERY      TONSILLECTOMY Bilateral     UPPER GASTROINTESTINAL ENDOSCOPY          Family History   Problem Relation Age of Onset    Stroke Father     Heart attack Father     Hearing loss Father     Hyperlipidemia Father     Hypertension Mother     No Known Problems Maternal Grandmother     No Known Problems Maternal Grandfather     No Known Problems Paternal Grandmother     No Known Problems Paternal Grandfather     Heart disease Brother     Malig Hyperthermia Neg Hx     Colon cancer Neg Hx     Colon polyps Neg Hx         Social History     Socioeconomic History    Marital status:      Spouse name: Leidy   Tobacco Use    Smoking status: Former     Packs/day: 2.00     Years: 14.00     Additional pack years: 0.00     Total pack years: 28.00     Types: Cigarettes     Quit date: 1980     Years since quittin.8    Smokeless tobacco: Never    Tobacco comments:     He smoked between the ages of 18 and 32.   Vaping Use    Vaping Use: Never used   Substance and Sexual Activity    Alcohol use: Yes     Alcohol/week: 4.0 standard drinks of alcohol     Types: 4 Glasses of wine per week     Comment: occ.     "Drug use: No    Sexual activity: Yes     Partners: Female             Allergies   Allergen Reactions    Amoxicillin Itching and Swelling    Levaquin [Levofloxacin] Nausea And Vomiting      Review of systems as mentioned in the HPI    Objective   Blood pressure 148/80, pulse 61, temperature 98.6 øF (37 øC), temperature source Temporal, resp. rate 18, height 167.6 cm (65.98\"), weight 107 kg (235 lb 9.6 oz), SpO2 97%.     Physical Exam   Constitutional: He is oriented to person, place, and time. No distress.   Pulmonary/Chest: No respiratory distress.   Neurological: He is oriented to person, place, and time.   Nursing note and vitals reviewed.  Physical exam limited due to telehealth visit      Lab on 10/12/2023   Component Date Value Ref Range Status    Ferritin 10/12/2023 73.20  30.00 - 400.00 ng/mL Final    Iron 10/12/2023 60  59 - 158 mcg/dL Final    Iron Saturation (TSAT) 10/12/2023 19 (L)  20 - 50 % Final    Transferrin 10/12/2023 214  200 - 360 mg/dL Final    TIBC 10/12/2023 319  298 - 536 mcg/dL Final    WBC 10/12/2023 8.15  3.40 - 10.80 10*3/mm3 Final    RBC 10/12/2023 4.85  4.14 - 5.80 10*6/mm3 Final    Hemoglobin 10/12/2023 14.7  13.0 - 17.7 g/dL Final    Hematocrit 10/12/2023 45.5  37.5 - 51.0 % Final    MCV 10/12/2023 93.8  79.0 - 97.0 fL Final    MCH 10/12/2023 30.3  26.6 - 33.0 pg Final    MCHC 10/12/2023 32.3  31.5 - 35.7 g/dL Final    RDW 10/12/2023 13.4  12.3 - 15.4 % Final    RDW-SD 10/12/2023 46.4  37.0 - 54.0 fl Final    MPV 10/12/2023 8.5  6.0 - 12.0 fL Final    Platelets 10/12/2023 244  140 - 450 10*3/mm3 Final    Neutrophil % 10/12/2023 65.9  42.7 - 76.0 % Final    Lymphocyte % 10/12/2023 21.3  19.6 - 45.3 % Final    Monocyte % 10/12/2023 9.0  5.0 - 12.0 % Final    Eosinophil % 10/12/2023 2.5  0.3 - 6.2 % Final    Basophil % 10/12/2023 0.9  0.0 - 1.5 % Final    Immature Grans % 10/12/2023 0.4  0.0 - 0.5 % Final    Neutrophils, Absolute 10/12/2023 5.38  1.70 - 7.00 10*3/mm3 Final    Lymphocytes, " Absolute 10/12/2023 1.74  0.70 - 3.10 10*3/mm3 Final    Monocytes, Absolute 10/12/2023 0.73  0.10 - 0.90 10*3/mm3 Final    Eosinophils, Absolute 10/12/2023 0.20  0.00 - 0.40 10*3/mm3 Final    Basophils, Absolute 10/12/2023 0.07  0.00 - 0.20 10*3/mm3 Final    Immature Grans, Absolute 10/12/2023 0.03  0.00 - 0.05 10*3/mm3 Final    nRBC 10/12/2023 0.0  0.0 - 0.2 /100 WBC Final        No radiology results for the last 30 days.     4/14/2022  CBC-WBC 5.8, hemoglobin 12.1 which is a decrease from 14 4 months ago, platelets 227  Ferritin low at 15.7  Iron studies show low iron saturation 10%, transferrin 282, TIBC 420    1/5/2023  CBC-within normal limits    Assessment & Plan      Mr. Holder is a 76-year-old  male referred for further work-up of anemia.  On reviewing the labs he is noted to have low hemoglobin since May 2019.  Ferritin was normal in the past however most recent ferritin from 7/8/2020 noted to be low at 12.  This is indicative of iron deficiency.  He has been on oral iron and has had a good response.    1.anemia  Work-up consistent with iron deficiency   Had a colonoscopy in December 2020 which does not show any evidence of bleeding  EGD June 2021 with esophagitis and Castellon's esophagus.  He had some hemorrhoidal bleeding with stopped about 3 months ago.  He continues on oral iron every other day.  Hemoglobin today is normal at 14.1.  Ferritin normal at 36.1, iron profile shows an iron saturation of 21% and TIBC 337  Has not been on oral iron for about 4 to 5 months now.  Hemoglobin decreased to 12.1 and therefore oral iron resumed.  Repeat EGD and C-scope 5/31/2022 without any evidence of bleeding  Continue oral iron.  Recommend taking 2 daily.  1/5/2023: Taking oral iron 2 tablets every other day, tolerating well.  Hemoglobin today 14.6, ferritin 72.3, iron saturation 27%, TIBC 329.  Capsule endoscopy with GI 11/2022 came back normal, no source of anemia identified.  3/30/2023-CBC reviewed and  WBC 7.97, hemoglobin 14.3 and platelets 215,000  Iron saturation 25%, TIBC 289  Ferritin 112.3  Continue iron every other day  10/12/2023 Hgb 14.7, ferritin, 73.2, iron saturation 19%, TIBC 319. Will increase iron back to two tablets every other day.     2.Hypertension-blood pressure well controlled, continue current medications.    3.GI work-up for iron deficiency anemia-with C-scope and EGD performed 5/31/2022 negative.  Capsule endoscopy with GI 11/28/2022 came back normal, no source of anemia.  Hemoglobin remains normal    4.Hyperlipidemia , continue atorvastatin, stable    5.Mood-continue Zoloft, stable    6.Obesity-BMI 38    PLAN:   Increase oral iron to two, every other day  Check CBC, ferritin, iron profile in 5-6 weeks.  Follow up in about 3 months with Dr. Hein with repeat CBC, ferritin, iron profile.  Call/ return sooner should the patient develop any new concerns or problems.        Ida Mackey, JEFF  10/12/23    Mode of Visit: Video  Location of patient: other: office  You have chosen to receive care through a telehealth visit.  Does the patient consent to use a video/audio connection for your medical care today? Yes  The visit included audio and video interaction. No technical issues occurred during this visit.

## 2023-10-30 RX ORDER — LEVOTHYROXINE SODIUM 0.07 MG/1
75 TABLET ORAL DAILY
Qty: 90 TABLET | Refills: 1 | Status: SHIPPED | OUTPATIENT
Start: 2023-10-30

## 2023-10-30 RX ORDER — CARVEDILOL 6.25 MG/1
6.25 TABLET ORAL 2 TIMES DAILY WITH MEALS
Qty: 180 TABLET | Refills: 1 | Status: SHIPPED | OUTPATIENT
Start: 2023-10-30

## 2023-10-30 RX ORDER — AMLODIPINE BESYLATE 10 MG/1
10 TABLET ORAL DAILY
Qty: 90 TABLET | Refills: 1 | Status: SHIPPED | OUTPATIENT
Start: 2023-10-30

## 2023-10-30 RX ORDER — HYDROCHLOROTHIAZIDE 12.5 MG/1
12.5 TABLET ORAL DAILY
Qty: 90 TABLET | Refills: 1 | Status: SHIPPED | OUTPATIENT
Start: 2023-10-30

## 2023-10-30 NOTE — TELEPHONE ENCOUNTER
Rx Refill Note  Requested Prescriptions     Pending Prescriptions Disp Refills    carvedilol (COREG) 6.25 MG tablet 180 tablet 1     Sig: Take 1 tablet by mouth 2 (Two) Times a Day With Meals.    amLODIPine (NORVASC) 10 MG tablet 90 tablet 1     Sig: Take 1 tablet by mouth Daily.    hydroCHLOROthiazide (HYDRODIURIL) 12.5 MG tablet 90 tablet 1     Sig: Take 1 tablet by mouth Daily.    levothyroxine (SYNTHROID, LEVOTHROID) 75 MCG tablet 90 tablet 1     Sig: Take 1 tablet by mouth Daily.      Last office visit with prescribing clinician: Visit date not found   Last telemedicine visit with prescribing clinician: Visit date not found   Next office visit with prescribing clinician: Visit date not found                         Would you like a call back once the refill request has been completed: [] Yes [] No    If the office needs to give you a call back, can they leave a voicemail: [] Yes [] No    Zaida Granger MA  10/30/23, 10:03 EDT

## 2023-11-16 ENCOUNTER — CLINICAL SUPPORT (OUTPATIENT)
Dept: ONCOLOGY | Facility: HOSPITAL | Age: 78
End: 2023-11-16
Payer: MEDICARE

## 2023-11-16 ENCOUNTER — LAB (OUTPATIENT)
Dept: OTHER | Facility: HOSPITAL | Age: 78
End: 2023-11-16
Payer: MEDICARE

## 2023-11-16 DIAGNOSIS — D50.0 IRON DEFICIENCY ANEMIA DUE TO CHRONIC BLOOD LOSS: ICD-10-CM

## 2023-11-16 LAB
BASOPHILS # BLD AUTO: 0.09 10*3/MM3 (ref 0–0.2)
BASOPHILS NFR BLD AUTO: 1.2 % (ref 0–1.5)
DEPRECATED RDW RBC AUTO: 42.9 FL (ref 37–54)
EOSINOPHIL # BLD AUTO: 0.21 10*3/MM3 (ref 0–0.4)
EOSINOPHIL NFR BLD AUTO: 2.7 % (ref 0.3–6.2)
ERYTHROCYTE [DISTWIDTH] IN BLOOD BY AUTOMATED COUNT: 12.6 % (ref 12.3–15.4)
FERRITIN SERPL-MCNC: 87.6 NG/ML (ref 30–400)
HCT VFR BLD AUTO: 46.3 % (ref 37.5–51)
HGB BLD-MCNC: 14.9 G/DL (ref 13–17.7)
IMM GRANULOCYTES # BLD AUTO: 0.09 10*3/MM3 (ref 0–0.05)
IMM GRANULOCYTES NFR BLD AUTO: 1.2 % (ref 0–0.5)
IRON 24H UR-MRATE: 89 MCG/DL (ref 59–158)
IRON SATN MFR SERPL: 28 % (ref 20–50)
LYMPHOCYTES # BLD AUTO: 1.65 10*3/MM3 (ref 0.7–3.1)
LYMPHOCYTES NFR BLD AUTO: 21.5 % (ref 19.6–45.3)
MCH RBC QN AUTO: 29.9 PG (ref 26.6–33)
MCHC RBC AUTO-ENTMCNC: 32.2 G/DL (ref 31.5–35.7)
MCV RBC AUTO: 93 FL (ref 79–97)
MONOCYTES # BLD AUTO: 0.78 10*3/MM3 (ref 0.1–0.9)
MONOCYTES NFR BLD AUTO: 10.2 % (ref 5–12)
NEUTROPHILS NFR BLD AUTO: 4.85 10*3/MM3 (ref 1.7–7)
NEUTROPHILS NFR BLD AUTO: 63.2 % (ref 42.7–76)
NRBC BLD AUTO-RTO: 0 /100 WBC (ref 0–0.2)
PLATELET # BLD AUTO: 238 10*3/MM3 (ref 140–450)
PMV BLD AUTO: 8.6 FL (ref 6–12)
RBC # BLD AUTO: 4.98 10*6/MM3 (ref 4.14–5.8)
TIBC SERPL-MCNC: 316 MCG/DL (ref 298–536)
TRANSFERRIN SERPL-MCNC: 212 MG/DL (ref 200–360)
WBC NRBC COR # BLD: 7.67 10*3/MM3 (ref 3.4–10.8)

## 2023-11-16 PROCEDURE — 82728 ASSAY OF FERRITIN: CPT | Performed by: INTERNAL MEDICINE

## 2023-11-16 PROCEDURE — 36415 COLL VENOUS BLD VENIPUNCTURE: CPT

## 2023-11-16 PROCEDURE — 85025 COMPLETE CBC W/AUTO DIFF WBC: CPT | Performed by: INTERNAL MEDICINE

## 2023-11-16 PROCEDURE — 83540 ASSAY OF IRON: CPT | Performed by: INTERNAL MEDICINE

## 2023-11-16 PROCEDURE — 84466 ASSAY OF TRANSFERRIN: CPT | Performed by: INTERNAL MEDICINE

## 2023-11-16 NOTE — PROGRESS NOTES
Reviewed labs with Mr Galindo by phone.  Discussed improvement in labs, he continues iron two tablets every other day.  Confirmed appt with Dr Hein for January.

## 2023-12-07 ENCOUNTER — OFFICE VISIT (OUTPATIENT)
Dept: SLEEP MEDICINE | Facility: HOSPITAL | Age: 78
End: 2023-12-07
Payer: MEDICARE

## 2023-12-07 VITALS — HEIGHT: 66 IN | WEIGHT: 240 LBS | BODY MASS INDEX: 38.57 KG/M2 | HEART RATE: 69 BPM | OXYGEN SATURATION: 96 %

## 2023-12-07 DIAGNOSIS — G47.33 OSA ON CPAP: Primary | ICD-10-CM

## 2023-12-07 DIAGNOSIS — G47.36 HYPOXEMIA ASSOCIATED WITH SLEEP: ICD-10-CM

## 2023-12-07 DIAGNOSIS — E66.01 CLASS 2 SEVERE OBESITY DUE TO EXCESS CALORIES WITH SERIOUS COMORBIDITY AND BODY MASS INDEX (BMI) OF 38.0 TO 38.9 IN ADULT: ICD-10-CM

## 2023-12-07 PROCEDURE — G0463 HOSPITAL OUTPT CLINIC VISIT: HCPCS

## 2023-12-07 NOTE — PROGRESS NOTES
"Follow Up [facility] Note     Chief Complaint:  ROMINA     Primary Care Physician: Mynor Russ MD    Interval History:   The patient is a 78 y.o. male  who I last saw 2022 and that note was reviewed.  The patient reports he is doing well without new complaints.  He goes to bed 11 PM gets out of bed at 7:30 AM.    Review of Systems:    A complete review of systems was done and all were negative with the exception of some nasal congestion and postnasal drip    Social History:    Social History     Socioeconomic History    Marital status:      Spouse name: Leidy   Tobacco Use    Smoking status: Former     Packs/day: 2.00     Years: 14.00     Additional pack years: 0.00     Total pack years: 28.00     Types: Cigarettes     Quit date: 1980     Years since quittin.9    Smokeless tobacco: Never    Tobacco comments:     He smoked between the ages of 18 and 32.   Vaping Use    Vaping Use: Never used   Substance and Sexual Activity    Alcohol use: Yes     Alcohol/week: 4.0 standard drinks of alcohol     Types: 4 Glasses of wine per week     Comment: occ.    Drug use: No    Sexual activity: Yes     Partners: Female       Allergies:  Amoxicillin and Levaquin [levofloxacin]     Medication Review:  Reviewed.      Vital Signs:    Vitals:    23 1023   Pulse: 69   SpO2: 96%   Weight: 109 kg (240 lb)   Height: 167.6 cm (65.98\")     Body mass index is 38.76 kg/m².    Physical Exam:    Constitutional:  Well developed 78 y.o. male that appears in no apparent distress.  Awake & oriented times 3.  Normal mood with normal recent and remote memory and normal judgement.  Eyes:  Conjunctivae normal.  Oropharynx: Previously, moist mucous membranes without exudate and a large tongue and normal uvula and narrow posterior pharyngeal opening class II-3 Mallampati airway.    Self-administered Lolita Sleepiness Scale test results: 6 previously 4 and prior to that 6  0-5 Lower normal daytime " sleepiness  6-10 Higher normal daytime sleepiness  11-12 Mild, 13-15 Moderate, & 16-24 Severe excessive daytime sleepiness     Downloaded PAP Data Evaluated For Therapeutic Response and Compliance:  DME is [facility] and he uses nasal pillows.  Downloads between 9/6 and 12/4/2023 compliance 100%.  Average usage is 8 hours and 1 minute.  Average AHI is normal without leak.  Average auto CPAP pressure is 10.5 and his DreamStation 2 is set at 8-20    I have reviewed the above results and compared them with the patient's last downloads and reviewed with the patient.    Impression:   Severe obstructive sleep apnea with sleep-related hypoxia by home sleep study 4/7/2018, weight 243 pounds adequately treated with DreamStation 2 auto CPAP. The patient appears to be at goal with good compliance and usage. The patient has no complaints of hypersomnolence.     Plan:  Good sleep hygiene measures should be maintained.  Weight loss would be beneficial in this patient who has class II severe obesity by Body mass index is 38.76 kg/m²..      After evaluating the patient and assessing results available, the patient is benefiting from the treatment being provided.     The patient will continue DreamStation 2 auto CPAP.  Potential side effects of CPAP therapy reviewed and addressed as needed.  After clinical evaluation and review of downloads, I recommend no changes to the patient's pressures.  A new prescription will be sent to the patient's DME.    I answered all of the patient's questions.  The patient will call the [facility] Disorder Center for any problems and will follow up in 1 year.      Kendrick Diaz MD  Sleep Medicine  12/07/23  10:38 EST

## 2023-12-11 DIAGNOSIS — J20.9 BRONCHOSPASM WITH BRONCHITIS, ACUTE: ICD-10-CM

## 2023-12-11 RX ORDER — ALBUTEROL SULFATE 90 UG/1
AEROSOL, METERED RESPIRATORY (INHALATION)
Qty: 18 G | Refills: 1 | Status: SHIPPED | OUTPATIENT
Start: 2023-12-11

## 2023-12-11 NOTE — TELEPHONE ENCOUNTER
Rx Refill Note  Requested Prescriptions     Pending Prescriptions Disp Refills    albuterol sulfate HFA (Ventolin HFA) 108 (90 Base) MCG/ACT inhaler 18 g 1     Si puffs Q4H for cough and wheeze      Last office visit with prescribing clinician: 07/10/2023   Last telemedicine visit with prescribing clinician: Visit date not found   Next office visit with prescribing clinician: 01/15/2024                         Would you like a call back once the refill request has been completed: [] Yes [] No    If the office needs to give you a call back, can they leave a voicemail: [] Yes [] No    Darcy Chowdary Rep  23, 13:56 EST

## 2023-12-26 RX ORDER — LANSOPRAZOLE 30 MG/1
CAPSULE, DELAYED RELEASE ORAL
Qty: 90 CAPSULE | Refills: 3 | OUTPATIENT
Start: 2023-12-26

## 2024-01-02 ENCOUNTER — TELEPHONE (OUTPATIENT)
Dept: GASTROENTEROLOGY | Facility: CLINIC | Age: 79
End: 2024-01-02
Payer: MEDICARE

## 2024-01-02 RX ORDER — LANSOPRAZOLE 30 MG/1
30 CAPSULE, DELAYED RELEASE ORAL DAILY
Qty: 90 CAPSULE | Refills: 0 | Status: SHIPPED | OUTPATIENT
Start: 2024-01-02

## 2024-01-02 NOTE — TELEPHONE ENCOUNTER
"    Caller: Dick Galindo W \"Ed\"    Relationship: Self    Best call back number: [phone]    Requested Prescriptions:   LANSOPRAZOLE     [facility]arm[facility] where request should be sent[facility] [phone]    Last office visit with prescribing clinician: 11/16/2022   Last telemedicine visit with prescribing clinician: Visit date not found   Next office visit with prescribing clinician: 2/15/2024     Additional details provided by patient: PT IS REQ A MONTH SUPPLY TO BE SENT IN PRIOR TO APPT AS HE WILL BE OUT OF MEDICATION NEXT WEEK. PT DOES HAVE APPT ON THE BOOKS FOR 02/15/24     Does the patient have less than a 3 day supply:  [] Yes  [x] No    Would you like a call back once the refill request has been completed: [x] Yes [] No    If the office needs to give you a call back, can they leave a voicemail: [x] Yes [] No    Darcy Loza Rep   01/02/24 10:28 EST       "

## 2024-01-04 ENCOUNTER — OFFICE VISIT (OUTPATIENT)
Dept: ONCOLOGY | Facility: CLINIC | Age: 79
End: 2024-01-04
Payer: MEDICARE

## 2024-01-04 ENCOUNTER — LAB (OUTPATIENT)
Dept: OTHER | Facility: HOSPITAL | Age: 79
End: 2024-01-04
Payer: MEDICARE

## 2024-01-04 VITALS
SYSTOLIC BLOOD PRESSURE: 135 MMHG | OXYGEN SATURATION: 96 % | BODY MASS INDEX: 37.57 KG/M2 | RESPIRATION RATE: 18 BRPM | HEART RATE: 63 BPM | DIASTOLIC BLOOD PRESSURE: 83 MMHG | TEMPERATURE: 98.4 F | WEIGHT: 233.8 LBS | HEIGHT: 66 IN

## 2024-01-04 DIAGNOSIS — D50.0 IRON DEFICIENCY ANEMIA DUE TO CHRONIC BLOOD LOSS: Primary | ICD-10-CM

## 2024-01-04 DIAGNOSIS — D50.0 IRON DEFICIENCY ANEMIA DUE TO CHRONIC BLOOD LOSS: ICD-10-CM

## 2024-01-04 LAB
BASOPHILS # BLD AUTO: 0.07 10*3/MM3 (ref 0–0.2)
BASOPHILS NFR BLD AUTO: 0.9 % (ref 0–1.5)
DEPRECATED RDW RBC AUTO: 42.4 FL (ref 37–54)
EOSINOPHIL # BLD AUTO: 0.16 10*3/MM3 (ref 0–0.4)
EOSINOPHIL NFR BLD AUTO: 2.1 % (ref 0.3–6.2)
ERYTHROCYTE [DISTWIDTH] IN BLOOD BY AUTOMATED COUNT: 12.3 % (ref 12.3–15.4)
FERRITIN SERPL-MCNC: 95.7 NG/ML (ref 30–400)
HCT VFR BLD AUTO: 45.3 % (ref 37.5–51)
HGB BLD-MCNC: 14.7 G/DL (ref 13–17.7)
IMM GRANULOCYTES # BLD AUTO: 0.02 10*3/MM3 (ref 0–0.05)
IMM GRANULOCYTES NFR BLD AUTO: 0.3 % (ref 0–0.5)
IRON 24H UR-MRATE: 86 MCG/DL (ref 59–158)
IRON SATN MFR SERPL: 28 % (ref 20–50)
LYMPHOCYTES # BLD AUTO: 1.51 10*3/MM3 (ref 0.7–3.1)
LYMPHOCYTES NFR BLD AUTO: 20.1 % (ref 19.6–45.3)
MCH RBC QN AUTO: 30.3 PG (ref 26.6–33)
MCHC RBC AUTO-ENTMCNC: 32.5 G/DL (ref 31.5–35.7)
MCV RBC AUTO: 93.4 FL (ref 79–97)
MONOCYTES # BLD AUTO: 0.66 10*3/MM3 (ref 0.1–0.9)
MONOCYTES NFR BLD AUTO: 8.8 % (ref 5–12)
NEUTROPHILS NFR BLD AUTO: 5.08 10*3/MM3 (ref 1.7–7)
NEUTROPHILS NFR BLD AUTO: 67.8 % (ref 42.7–76)
NRBC BLD AUTO-RTO: 0 /100 WBC (ref 0–0.2)
PLATELET # BLD AUTO: 249 10*3/MM3 (ref 140–450)
PMV BLD AUTO: 8.7 FL (ref 6–12)
RBC # BLD AUTO: 4.85 10*6/MM3 (ref 4.14–5.8)
TIBC SERPL-MCNC: 305 MCG/DL (ref 298–536)
TRANSFERRIN SERPL-MCNC: 205 MG/DL (ref 200–360)
WBC NRBC COR # BLD AUTO: 7.5 10*3/MM3 (ref 3.4–10.8)

## 2024-01-04 PROCEDURE — 83540 ASSAY OF IRON: CPT | Performed by: INTERNAL MEDICINE

## 2024-01-04 PROCEDURE — 84466 ASSAY OF TRANSFERRIN: CPT | Performed by: INTERNAL MEDICINE

## 2024-01-04 PROCEDURE — 1159F MED LIST DOCD IN RCRD: CPT | Performed by: INTERNAL MEDICINE

## 2024-01-04 PROCEDURE — 3075F SYST BP GE 130 - 139MM HG: CPT | Performed by: INTERNAL MEDICINE

## 2024-01-04 PROCEDURE — 85025 COMPLETE CBC W/AUTO DIFF WBC: CPT | Performed by: INTERNAL MEDICINE

## 2024-01-04 PROCEDURE — 99214 OFFICE O/P EST MOD 30 MIN: CPT | Performed by: INTERNAL MEDICINE

## 2024-01-04 PROCEDURE — 3079F DIAST BP 80-89 MM HG: CPT | Performed by: INTERNAL MEDICINE

## 2024-01-04 PROCEDURE — 1160F RVW MEDS BY RX/DR IN RCRD: CPT | Performed by: INTERNAL MEDICINE

## 2024-01-04 PROCEDURE — 36415 COLL VENOUS BLD VENIPUNCTURE: CPT

## 2024-01-04 PROCEDURE — 82728 ASSAY OF FERRITIN: CPT | Performed by: INTERNAL MEDICINE

## 2024-01-04 PROCEDURE — 1126F AMNT PAIN NOTED NONE PRSNT: CPT | Performed by: INTERNAL MEDICINE

## 2024-01-04 NOTE — PROGRESS NOTES
Subjective   Dick Galindo is a 78 y.o. male.  Referred by Dr. Rodriguez for evaluation and management of anemia    This was an audio and video enabled telemedicine encounter.    History of Present Illness   Mr. Dubois is a 75-year-old  male with history of hypertension, hyperlipidemia, BPH status post TURP on 7/7/2020 presents for further evaluation of anemia.  He was scheduled for the TURP procedure on 7/7/2020 at which point a CBC was checked and his hemoglobin was noted to be low at 9.2.  This prompted further work-up with vitamin B12, folic acid, iron studies.  The vitamin B12 and folic acid levels were noted to be normal.  Iron studies showed a low ferritin consistent with iron deficiency.  He was then placed on oral iron which he has been taking for the past 2 weeks.  Today a repeat CBC shows an improvement in hemoglobin to 11.2.  He denies any lightheadedness, dizziness, chest pain, dyspnea, palpitations, lower extremity edema.  He reports having lost about 10 pounds over the past 2 weeks, he attributes this to changes in his diet as well as decreased appetite since the surgery.  He denies noting any blood in his stool any melena.  Denies any hematemesis.  He has been on Prevacid for a long time now for GERD.  His last colonoscopy was in 2018 on which there was diverticulosis, polyps and internal hemorrhoids noted.  EGD was also performed in 2018 on which mildly erythematous mucosa of the esophagus was noted but no other abnormalities.  He was scheduled for a colonoscopy in February 2020 by Dr. Roper however this has been delayed because of COVID and he plans to undergo colonoscopy soon.  6/3/2021 EGD-Z-line is irregular, 39 cm from the incisors.  Biopsied  Erythematous mucosa in the stomach biopsied.  No gross lesions in the entire examined duodenum.  Stomach biopsies consistent with scattered changes suggestive of mild reactive gastropathy.  GE junction biopsy with mild esophagitis and focal goblet  cell metaplasia Castellon's esophagus.    Interval history  Mr. Villasenor returns today for follow-up.  He is currently taking 2, 65 mg tablets of iron every other day.  He finds this easier on his stomach.  Denies any blood in stool or dark-colored stool.  Good energy levels.  No other new complaints at this time.      The following portions of the patient's history were reviewed and updated as appropriate: allergies, current medications, past family history, past medical history, past social history, past surgical history and problem list.    Past Medical History:   Diagnosis Date    Anxiety     Asthma     mild case, allergies    Castellon esophagus     Bronchitis     Colon polyps     Diverticulitis     Enlarged prostate     GERD (gastroesophageal reflux disease)     H/O bladder infections     H/O bronchitis     High cholesterol     History of anemia     Hypertension     Hypothyroidism     Seasonal allergies     Sleep apnea     CPAP    Slow urinary stream         Past Surgical History:   Procedure Laterality Date    COLONOSCOPY N/A approx 2012    COLONOSCOPY N/A 02/06/2018    Procedure: COLONOSCOPY INTO CEECUM AND TERMINAL MILEUM WITH COLD BIOPSY POLYPECTOMIES & COLD BIOIPSIES;  Surgeon: Usman Roper MD;  Location: [facility] ENDOSCOPY;  Service:     COLONOSCOPY N/A 12/03/2020    Procedure: COLONOSCOPY TO CECUM AND INTO TI WITH COLD BIOPSIES AND COLD BIOPSY POLYPECTOMY;  Surgeon: Usman Roper MD;  Location: [facility] ENDOSCOPY;  Service: Gastroenterology;  Laterality: N/A;  pre: history of colon polyps  post: diverticulosis, polyp, and internal hemorrhoids    COLONOSCOPY N/A 05/31/2022    Procedure: COLONOSCOPY to cecum and TI with biopsies and cold forcep polypectomies;  Surgeon: Usman Roper MD;  Location: [facility] ENDOSCOPY;  Service: Gastroenterology;  Laterality: N/A;  pre- hx of colon polyps, melena, iron deficiency anemia  post- diverticulosis, polyps, polyp stalk, internal hemorrhoids    CYSTOSCOPY TRANSURETHRAL  RESECTION OF PROSTATE N/A 2020    Procedure: TRANSURETHRAL RESECTION OF PROSTATE;  Surgeon: Charanjit Cameron MD;  Location: [facility] MAIN OR;  Service: Urology;  Laterality: N/A;    ENDOSCOPY N/A 2018    Procedure: ESOPHAGOGASTRODUODENOSCOPY WITH COLD BIOPSIES;  Surgeon: Usman Roper MD;  Location: [facility] ENDOSCOPY;  Service:     ENDOSCOPY N/A 2021    Procedure: ESOPHAGOGASTRODUODENOSCOPY WITH COLD BIOPSIES;  Surgeon: Usman Roper MD;  Location: [facility] ENDOSCOPY;  Service: Gastroenterology;  Laterality: N/A;  PRE:IRON DEFICIENCY ANEMIA  POST: GASTRITIS    ENDOSCOPY N/A 2022    Procedure: ESOPHAGOGASTRODUODENOSCOPY with biopsies;  Surgeon: Usman Roper MD;  Location: [facility] ENDOSCOPY;  Service: Gastroenterology;  Laterality: N/A;  pre- Castellon's esophagus  post- gastritis, gastric polyps    PROSTATE SURGERY      TONSILLECTOMY Bilateral     UPPER GASTROINTESTINAL ENDOSCOPY          Family History   Problem Relation Age of Onset    Stroke Father     Heart attack Father     Hearing loss Father     Hyperlipidemia Father     Hypertension Mother     No Known Problems Maternal Grandmother     No Known Problems Maternal Grandfather     No Known Problems Paternal Grandmother     No Known Problems Paternal Grandfather     Heart disease Brother     Malig Hyperthermia Neg Hx     Colon cancer Neg Hx     Colon polyps Neg Hx         Social History     Socioeconomic History    Marital status:      Spouse name: Leidy   Tobacco Use    Smoking status: Former     Packs/day: 2.00     Years: 14.00     Additional pack years: 0.00     Total pack years: 28.00     Types: Cigarettes     Quit date: 1980     Years since quittin.0    Smokeless tobacco: Never    Tobacco comments:     He smoked between the ages of 18 and 32.   Vaping Use    Vaping Use: Never used   Substance and Sexual Activity    Alcohol use: Yes     Alcohol/week: 4.0 standard drinks of alcohol     Types: 4 Glasses of wine per  "week     Comment: occ.    Drug use: No    Sexual activity: Yes     Partners: Female             Allergies   Allergen Reactions    Amoxicillin Itching and Swelling    Levaquin [Levofloxacin] Nausea And Vomiting        Review of systems as mentioned in the HPI otherwise negative    Objective   Blood pressure 135/83, pulse 63, temperature 98.4 °F (36.9 °C), temperature source Temporal, resp. rate 18, height 167.6 cm (65.98\"), weight 106 kg (233 lb 12.8 oz), SpO2 96%.     Physical Exam   Constitutional: He is oriented to person, place, and time. No distress. He is obese.  HENT:   Nose: Nose normal.   Mouth/Throat: Oropharynx is clear.   Eyes: Conjunctivae are normal.   Cardiovascular: Normal rate.   Pulmonary/Chest: No respiratory distress.   Neurological: He is oriented to person, place, and time.   Psychiatric: His behavior is normal. Mood, judgment and thought content normal.   Nursing note and vitals reviewed.        Admission on 12/08/2023, Discharged on 12/08/2023   Component Date Value Ref Range Status    SARS Antigen 12/08/2023 Not Detected  Not Detected, Presumptive Negative Final    Influenza A Antigen ROSE MARIE 12/08/2023 Not Detected  Not Detected Final    Influenza B Antigen ROSE MARIE 12/08/2023 Not Detected  Not Detected Final    Internal Control 12/08/2023 Passed  Passed Final    Lot Number 12/08/2023 [id number]   Final    Expiration Date 12/08/2023 10/24   Final        No radiology results for the last 30 days.     1/4/2024-WBC 7.5, hemoglobin 14.7, platelets 249,000  Ferritin 95.7, iron profile reviewed and iron saturation 28%, TIBC 305    Assessment & Plan      Mr. Holder is a 76-year-old  male referred for further work-up of anemia.  On reviewing the labs he is noted to have low hemoglobin since May 2019.  Ferritin was normal in the past however most recent ferritin from 7/8/2020 noted to be low at 12.  This is indicative of iron deficiency.  He has been on oral iron and has had a good " response.    1.anemia  Work-up consistent with iron deficiency   Had a colonoscopy in December 2020 which does not show any evidence of bleeding  EGD June 2021 with esophagitis and Castellon's esophagus.  He had some hemorrhoidal bleeding with stopped about 3 months ago.  He continues on oral iron every other day.  Hemoglobin today is normal at 14.1.  Ferritin normal at 36.1, iron profile shows an iron saturation of 21% and TIBC 337  Has not been on oral iron for about 4 to 5 months now.  Hemoglobin decreased to 12.1 and therefore oral iron resumed.  Repeat EGD and C-scope 5/31/2022 without any evidence of bleeding  Continue oral iron.  Recommend taking 2 daily.  1/5/2023: Taking oral iron 2 tablets every other day, tolerating well.  Hemoglobin today 14.6, ferritin 72.3, iron saturation 27%, TIBC 329.  Capsule endoscopy with GI 11/2022 came back normal, no source of anemia identified.  3/30/2023-CBC reviewed and WBC 7.97, hemoglobin 14.3 and platelets 215,000  Iron saturation 25%, TIBC 289  Ferritin 112.3  Continue oral iron at current dose.  And labs reviewed and overall stable.  Recheck iron studies in 6 months.    2.Hypertension-blood pressure well controlled, continue current medications.    3.GI work-up for iron deficiency anemia-with C-scope and EGD performed 5/31/2022 negative.  Capsule endoscopy with GI 11/28/2022 came back normal, no source of anemia.  Hemoglobin remains normal today at 14.7    4.Hyperlipidemia , continue atorvastatin, stable    5.Mood-continue Zoloft, stable    6.Obesity-BMI 37.8    PLAN:   Continue current dose of oral iron  Repeat labs in 6  APRN in 6 months and MD in 1 year        Harika Hein MD  01/04/24

## 2024-01-26 ENCOUNTER — OFFICE VISIT (OUTPATIENT)
Dept: FAMILY MEDICINE CLINIC | Facility: CLINIC | Age: 79
End: 2024-01-26
Payer: MEDICARE

## 2024-01-26 VITALS
HEIGHT: 66 IN | DIASTOLIC BLOOD PRESSURE: 71 MMHG | RESPIRATION RATE: 14 BRPM | OXYGEN SATURATION: 95 % | WEIGHT: 231.5 LBS | SYSTOLIC BLOOD PRESSURE: 121 MMHG | BODY MASS INDEX: 37.21 KG/M2 | HEART RATE: 75 BPM | TEMPERATURE: 98.2 F

## 2024-01-26 DIAGNOSIS — F33.42 RECURRENT MAJOR DEPRESSIVE DISORDER, IN FULL REMISSION: ICD-10-CM

## 2024-01-26 DIAGNOSIS — E78.01 FAMILIAL HYPERCHOLESTEROLEMIA: ICD-10-CM

## 2024-01-26 DIAGNOSIS — E03.8 SUBCLINICAL HYPOTHYROIDISM: ICD-10-CM

## 2024-01-26 DIAGNOSIS — Z00.00 MEDICARE ANNUAL WELLNESS VISIT, SUBSEQUENT: Primary | ICD-10-CM

## 2024-01-26 DIAGNOSIS — I10 ESSENTIAL HYPERTENSION: ICD-10-CM

## 2024-01-26 NOTE — PROGRESS NOTES
The ABCs of the Annual Wellness Visit  Subsequent Medicare Wellness Visit    Subjective    Dick Galindo is a 78 y.o. male who presents for a Subsequent Medicare Wellness Visit.    The following portions of the patient's history were reviewed and   updated as appropriate: allergies, current medications, past family history, past medical history, past social history, past surgical history, and problem list.    Compared to one year ago, the patient feels his physical   health is the same.    Compared to one year ago, the patient feels his mental   health is the same.    Recent Hospitalizations:  He was not admitted to the hospital during the last year.       Current Medical Providers:  Patient Care Team:  Mynor Russ MD as PCP - General (Family Medicine)  Mynor Russ MD as Referring Physician (Family Medicine)  Kajal Bean MD as Surgeon (General Surgery)  Harika Hein MD as Consulting Physician (Hematology and Oncology)    Outpatient Medications Prior to Visit   Medication Sig Dispense Refill    acetaminophen (TYLENOL) 325 MG tablet Take 2 tablets by mouth Every 6 (Six) Hours As Needed.      albuterol sulfate HFA (Ventolin HFA) 108 (90 Base) MCG/ACT inhaler 2 puffs Q4H for cough and wheeze 18 g 1    amLODIPine (NORVASC) 10 MG tablet Take 1 tablet by mouth Daily. 90 tablet 1    atorvastatin (LIPITOR) 40 MG tablet Take 1 tablet by mouth Daily. 90 tablet 1    carvedilol (COREG) 6.25 MG tablet Take 1 tablet by mouth 2 (Two) Times a Day With Meals. 180 tablet 1    hydroCHLOROthiazide (HYDRODIURIL) 12.5 MG tablet Take 1 tablet by mouth Daily. 90 tablet 1    lansoprazole (PREVACID) 30 MG capsule Take 1 capsule by mouth Daily. 90 capsule 0    levothyroxine (SYNTHROID, LEVOTHROID) 75 MCG tablet Take 1 tablet by mouth Daily. 90 tablet 1    lisinopril (PRINIVIL,ZESTRIL) 40 MG tablet Take 1 tablet by mouth Daily. 90 tablet 1    sertraline (ZOLOFT) 50 MG tablet TAKE ONE TABLET BY MOUTH DAILY 90 tablet 1     "benzonatate (TESSALON) 200 MG capsule Take 1 capsule by mouth 3 (Three) Times a Day As Needed for Cough. (Patient not taking: Reported on 1/26/2024) 20 capsule 0    ondansetron ODT (ZOFRAN-ODT) 4 MG disintegrating tablet Place 1 tablet on the tongue Every 8 (Eight) Hours As Needed for Nausea. 10 tablet 0     No facility-administered medications prior to visit.       No opioid medication identified on active medication list. I have reviewed chart for other potential  high risk medication/s and harmful drug interactions in the elderly.        Aspirin is not on active medication list.  Aspirin use is not indicated based on review of current medical condition/s. Risk of harm outweighs potential benefits.  .    Patient Active Problem List   Diagnosis    Spinal stenosis of lumbar region    Essential hypertension    Lower urinary tract symptoms (LUTS)    Familial hypercholesterolemia    Recurrent major depressive disorder, in full remission    Hyperplastic colon polyp    Gastroesophageal reflux disease without esophagitis    Subclinical hypothyroidism    Severe ROMINA on auto CPAP    Hypoxemia associated with sleep    Bilateral renal cysts    Class 2 severe obesity due to excess calories with serious comorbidity and body mass index (BMI) of 38.0 to 38.9 in adult    BPH with obstruction/lower urinary tract symptoms    Colon polyp    Iron deficiency anemia    History of acute gastritis    Castellon's esophagus with dysplasia    Heme positive stool     Advance Care Planning   Advance Care Planning     Advance Directive is on file.  ACP discussion was held with the patient during this visit. Patient has an advance directive in EMR which is still valid.      Objective    Vitals:    01/26/24 1119   BP: 121/71   Pulse: 75   Resp: 14   Temp: 98.2 °F (36.8 °C)   TempSrc: Temporal   SpO2: 95%   Weight: 105 kg (231 lb 8 oz)   Height: 167.6 cm (66\")   PainSc: 0-No pain     Estimated body mass index is 37.37 kg/m² as calculated from the " "following:    Height as of this encounter: 167.6 cm (66\").    Weight as of this encounter: 105 kg (231 lb 8 oz).    Class 2 Severe Obesity (BMI >=35 and <=39.9). Obesity-related health conditions include the following: hypertension. Obesity is unchanged. BMI is is above average; BMI management plan is completed. We discussed portion control and increasing exercise.      Does the patient have evidence of cognitive impairment? No    Lab Results   Component Value Date    CHLPL 140 2024    TRIG 79 2024    HDL 55 2024    LDL 70 2024    VLDL 15 2024        HEALTH RISK ASSESSMENT    Smoking Status:  Social History     Tobacco Use   Smoking Status Former    Packs/day: 2.00    Years: 14.00    Additional pack years: 0.00    Total pack years: 28.00    Types: Cigarettes    Quit date: 1980    Years since quittin.0    Passive exposure: Past   Smokeless Tobacco Never   Tobacco Comments    He smoked between the ages of 18 and 32.     Alcohol Consumption:  Social History     Substance and Sexual Activity   Alcohol Use Yes    Alcohol/week: 4.0 standard drinks of alcohol    Types: 4 Glasses of wine per week    Comment: occ.     Fall Risk Screen:    STEADI Fall Risk Assessment was completed, and patient is at LOW risk for falls.Assessment completed on:2024    Depression Screenin/26/2024    11:13 AM   PHQ-2/PHQ-9 Depression Screening   Little Interest or Pleasure in Doing Things 0-->not at all   Feeling Down, Depressed or Hopeless 0-->not at all   PHQ-9: Brief Depression Severity Measure Score 0       Health Habits and Functional and Cognitive Screenin/26/2024    11:15 AM   Functional & Cognitive Status   Do you have difficulty preparing food and eating? No   Do you have difficulty bathing yourself, getting dressed or grooming yourself? No   Do you have difficulty using the toilet? No   Do you have difficulty moving around from place to place? No   Do you have trouble with " steps or getting out of a bed or a chair? No   Current Diet Limited Junk Food   Dental Exam Up to date   Eye Exam Up to date   Exercise (times per week) 3 times per week   Current Exercises Include Walking   Do you need help using the phone?  No   Are you deaf or do you have serious difficulty hearing?  No   Do you need help to go to places out of walking distance? No   Do you need help shopping? No   Do you need help preparing meals?  No   Do you need help with housework?  No   Do you need help with laundry? No   Do you need help taking your medications? No   Do you need help managing money? No   Do you ever drive or ride in a car without wearing a seat belt? No   Have you felt unusual stress, anger or loneliness in the last month? No   Who do you live with? Spouse   If you need help, do you have trouble finding someone available to you? No   Have you been bothered in the last four weeks by sexual problems? No   Do you have difficulty concentrating, remembering or making decisions? No       Age-appropriate Screening Schedule:  Refer to the list below for future screening recommendations based on patient's age, sex and/or medical conditions. Orders for these recommended tests are listed in the plan section. The patient has been provided with a written plan.    Health Maintenance   Topic Date Due    COVID-19 Vaccine (4 - 2023-24 season) 01/22/2025 (Originally 9/1/2023)    LIPID PANEL  01/08/2025    ANNUAL WELLNESS VISIT  01/26/2025    BMI FOLLOWUP  01/26/2025    GASTROSCOPY (EGD)  05/31/2025    COLORECTAL CANCER SCREENING  05/31/2025    TDAP/TD VACCINES (2 - Td or Tdap) 01/18/2033    HEPATITIS C SCREENING  Completed    INFLUENZA VACCINE  Completed    Pneumococcal Vaccine 65+  Completed    ZOSTER VACCINE  Completed                  CMS Preventative Services Quick Reference  Risk Factors Identified During Encounter  Immunizations Discussed/Encouraged: Prevnar 20 (Pneumococcal 20-valent conjugate), COVID19, and RSV  "(Respiratory Syncytial Virus)  The above risks/problems have been discussed with the patient.  Pertinent information has been shared with the patient in the After Visit Summary.  An After Visit Summary and PPPS were made available to the patient.    Follow Up:   Next Medicare Wellness visit to be scheduled in 1 year.       Additional E&M Note during same encounter follows:  Patient has multiple medical problems which are significant and separately identifiable that require additional work above and beyond the Medicare Wellness Visit.      Chief Complaint  Annual Exam (Medicare wellness exam)    Subjective        HPI  Dick Galindo is also being seen today for medicare  wellness.    Also follow-up hypertension.  Continues amlodipine carvedilol hydrochlorothiazide and lisinopril.  Blood pressure is excellent.  Creatinine normal.  Electrolytes look good.  No cardiovascular symptoms.  He maintains activity.    Hyperlipidemia.  Continues on atorvastatin 40 mg a day.  Lipid panel overall favorable with a relatively high HDL/good cholesterol.    Hypothyroidism.  Continues on levothyroxine 75 mcg a day.  TSH therapeutic.    Normal glucose.    Major depression with history of panic attack.  He maintains on 50 mg of sertraline.  He is attempted to wean in the past.  He wants to stay on it.  No contraindications.         Objective   Vital Signs:  /71   Pulse 75   Temp 98.2 °F (36.8 °C) (Temporal)   Resp 14   Ht 167.6 cm (66\")   Wt 105 kg (231 lb 8 oz)   SpO2 95%   BMI 37.37 kg/m²     Physical Exam  Constitutional:       Appearance: Normal appearance. He is obese.   HENT:      Head: Atraumatic.      Mouth/Throat:      Pharynx: Oropharynx is clear. No oropharyngeal exudate or posterior oropharyngeal erythema.   Eyes:      Conjunctiva/sclera: Conjunctivae normal.   Neck:      Thyroid: No thyroid mass, thyromegaly or thyroid tenderness.   Cardiovascular:      Rate and Rhythm: Normal rate and regular rhythm.      " Pulses: Normal pulses.      Heart sounds: Normal heart sounds.   Pulmonary:      Effort: Pulmonary effort is normal.      Breath sounds: Normal breath sounds.   Abdominal:      General: Abdomen is flat. There is no distension.      Palpations: Abdomen is soft. There is no mass.      Tenderness: There is no abdominal tenderness.      Hernia: No hernia is present.   Musculoskeletal:         General: Normal range of motion.      Cervical back: Normal range of motion and neck supple. No muscular tenderness.   Lymphadenopathy:      Cervical: No cervical adenopathy.   Skin:     General: Skin is warm and dry.      Findings: No rash.   Neurological:      General: No focal deficit present.      Mental Status: He is alert and oriented to person, place, and time.   Psychiatric:         Mood and Affect: Mood normal.          The following data was reviewed by: Mynor Russ MD on 01/26/2024:  Common labs          11/16/2023    09:52 1/4/2024    11:04 1/8/2024    08:41   Common Labs   Glucose   98    BUN   23    Creatinine   0.96    Sodium   141    Potassium   4.1    Chloride   101    Calcium   9.2    Total Protein   6.7    Albumin   4.2    Total Bilirubin   0.6    Alkaline Phosphatase   92    AST (SGOT)   21    ALT (SGPT)   23    WBC 7.67  7.50  8.01    Hemoglobin 14.9  14.7  15.0    Hematocrit 46.3  45.3  43.9    Platelets 238  249  249    Total Cholesterol   140    Triglycerides   79    HDL Cholesterol   55    LDL Cholesterol    70                 Assessment and Plan   Diagnoses and all orders for this visit:    1. Medicare annual wellness visit, subsequent (Primary)    2. Essential hypertension  -     CBC & Differential; Future  -     Comprehensive Metabolic Panel; Future  -     Lipid Panel; Future  -     TSH Rfx On Abnormal To Free T4; Future    3. Familial hypercholesterolemia  -     CBC & Differential; Future  -     Comprehensive Metabolic Panel; Future  -     Lipid Panel; Future  -     TSH Rfx On Abnormal To Free T4;  Future    4. Subclinical hypothyroidism  -     CBC & Differential; Future  -     Comprehensive Metabolic Panel; Future  -     Lipid Panel; Future  -     TSH Rfx On Abnormal To Free T4; Future    5. Recurrent major depressive disorder, in full remission  -     CBC & Differential; Future  -     Comprehensive Metabolic Panel; Future  -     Lipid Panel; Future  -     TSH Rfx On Abnormal To Free T4; Future    Other orders  -     Pneumococcal Conjugate Vaccine 20-Valent All      Annual Medicare wellness visit.    Immunizations.  Prevnar 20 today.  RSV and COVID-19 vaccine at retail pharmacy recommended.    Colon cancer screening up-to-date.  Due 2025.  He has an appointment with his gastroenterologist soon.    Anxiety and major depression.  In remission.  Continues on maintenance sertraline.    Hypertension.  Well-controlled.    Hyperlipidemia.  Well-controlled.    Hypothyroidism.  TSH therapeutic.    See me in 6 months.  Sooner as needed.         Follow Up   No follow-ups on file.  Patient was given instructions and counseling regarding his condition or for health maintenance advice. Please see specific information pulled into the AVS if appropriate.

## 2024-02-15 ENCOUNTER — OFFICE VISIT (OUTPATIENT)
Dept: GASTROENTEROLOGY | Facility: CLINIC | Age: 79
End: 2024-02-15
Payer: MEDICARE

## 2024-02-15 VITALS
OXYGEN SATURATION: 92 % | SYSTOLIC BLOOD PRESSURE: 131 MMHG | BODY MASS INDEX: 37.96 KG/M2 | HEIGHT: 66 IN | WEIGHT: 236.2 LBS | TEMPERATURE: 97.1 F | DIASTOLIC BLOOD PRESSURE: 77 MMHG | HEART RATE: 56 BPM

## 2024-02-15 DIAGNOSIS — K21.9 GASTROESOPHAGEAL REFLUX DISEASE WITHOUT ESOPHAGITIS: ICD-10-CM

## 2024-02-15 DIAGNOSIS — K22.719 BARRETT'S ESOPHAGUS WITH DYSPLASIA: ICD-10-CM

## 2024-02-15 DIAGNOSIS — K63.5 POLYP OF COLON, UNSPECIFIED PART OF COLON, UNSPECIFIED TYPE: Primary | ICD-10-CM

## 2024-02-15 PROCEDURE — 1160F RVW MEDS BY RX/DR IN RCRD: CPT | Performed by: INTERNAL MEDICINE

## 2024-02-15 PROCEDURE — 3078F DIAST BP <80 MM HG: CPT | Performed by: INTERNAL MEDICINE

## 2024-02-15 PROCEDURE — 3075F SYST BP GE 130 - 139MM HG: CPT | Performed by: INTERNAL MEDICINE

## 2024-02-15 PROCEDURE — 1159F MED LIST DOCD IN RCRD: CPT | Performed by: INTERNAL MEDICINE

## 2024-02-15 PROCEDURE — 99214 OFFICE O/P EST MOD 30 MIN: CPT | Performed by: INTERNAL MEDICINE

## 2024-02-15 RX ORDER — FAMOTIDINE 20 MG/1
20 TABLET, FILM COATED ORAL
Status: SHIPPED | OUTPATIENT
Start: 2024-02-15

## 2024-02-27 RX ORDER — LISINOPRIL 40 MG/1
40 TABLET ORAL DAILY
Qty: 90 TABLET | Refills: 1 | Status: SHIPPED | OUTPATIENT
Start: 2024-02-27

## 2024-02-27 RX ORDER — ATORVASTATIN CALCIUM 40 MG/1
40 TABLET, FILM COATED ORAL DAILY
Qty: 90 TABLET | Refills: 1 | Status: SHIPPED | OUTPATIENT
Start: 2024-02-27

## 2024-02-27 NOTE — TELEPHONE ENCOUNTER
Rx Refill Note  Requested Prescriptions     Pending Prescriptions Disp Refills    atorvastatin (LIPITOR) 40 MG tablet [Pharmacy Med Name: ATORVASTATIN 40 MG TABLET] 90 tablet 1     Sig: TAKE 1 TABLET BY MOUTH DAILY    lisinopril (PRINIVIL,ZESTRIL) 40 MG tablet [Pharmacy Med Name: LISINOPRIL 40 MG TABLET] 90 tablet 1     Sig: TAKE 1 TABLET BY MOUTH DAILY      Last office visit with prescribing clinician: 1/26/2024   Last telemedicine visit with prescribing clinician: Visit date not found   Next office visit with prescribing clinician: 7/25/2024                         Would you like a call back once the refill request has been completed: [] Yes [] No    If the office needs to give you a call back, can they leave a voicemail: [] Yes [] No    Cy Franklin MA  02/27/24, 08:47 EST

## 2024-02-27 NOTE — TELEPHONE ENCOUNTER
Rx Refill Note  Requested Prescriptions     Pending Prescriptions Disp Refills    atorvastatin (LIPITOR) 40 MG tablet [Pharmacy Med Name: ATORVASTATIN 40 MG TABLET] 90 tablet 1     Sig: TAKE 1 TABLET BY MOUTH DAILY    lisinopril (PRINIVIL,ZESTRIL) 40 MG tablet [Pharmacy Med Name: LISINOPRIL 40 MG TABLET] 90 tablet 1     Sig: TAKE 1 TABLET BY MOUTH DAILY      Last office visit with prescribing clinician: 1/26/2024   Last telemedicine visit with prescribing clinician: Visit date not found   Next office visit with prescribing clinician: 7/25/2024     /  {TIP  Please add Last Relevant Lab Date if appropriate: 01/08/24                 Would you like a call back once the refill request has been completed: [] Yes [] No    If the office needs to give you a call back, can they leave a voicemail: [] Yes [] No    Jasmyne Lopez MA  02/27/24, 08:46 EST

## 2024-04-01 NOTE — TELEPHONE ENCOUNTER
Rx Refill Note  Requested Prescriptions     Pending Prescriptions Disp Refills    sertraline (ZOLOFT) 50 MG tablet 90 tablet 1     Sig: Take 1 tablet by mouth Daily.      Last office visit with prescribing clinician: 1/26/2024   Last telemedicine visit with prescribing clinician: Visit date not found   Next office visit with prescribing clinician: 7/25/2024                         Would you like a call back once the refill request has been completed: [] Yes [] No    If the office needs to give you a call back, can they leave a voicemail: [] Yes [] No    Sarath Friend  04/01/24, 08:01 EDT

## 2024-04-29 RX ORDER — HYDROCHLOROTHIAZIDE 12.5 MG/1
12.5 TABLET ORAL DAILY
Qty: 90 TABLET | Refills: 1 | Status: SHIPPED | OUTPATIENT
Start: 2024-04-29

## 2024-04-29 RX ORDER — LEVOTHYROXINE SODIUM 0.07 MG/1
75 TABLET ORAL DAILY
Qty: 90 TABLET | Refills: 1 | Status: SHIPPED | OUTPATIENT
Start: 2024-04-29

## 2024-04-29 NOTE — TELEPHONE ENCOUNTER
Rx Refill Note  Requested Prescriptions     Pending Prescriptions Disp Refills    hydroCHLOROthiazide 12.5 MG tablet 90 tablet 1     Sig: Take 1 tablet by mouth Daily.    levothyroxine (SYNTHROID, LEVOTHROID) 75 MCG tablet 90 tablet 1     Sig: Take 1 tablet by mouth Daily.      Last office visit with prescribing clinician: 1/26/2024   Last telemedicine visit with prescribing clinician: Visit date not found   Next office visit with prescribing clinician: 7/25/2024                         Would you like a call back once the refill request has been completed: [] Yes [] No    If the office needs to give you a call back, can they leave a voicemail: [] Yes [] No    Sarath Friend  04/29/24, 08:27 EDT

## 2024-05-06 RX ORDER — CARVEDILOL 6.25 MG/1
6.25 TABLET ORAL 2 TIMES DAILY WITH MEALS
Qty: 180 TABLET | Refills: 1 | Status: SHIPPED | OUTPATIENT
Start: 2024-05-06

## 2024-05-06 RX ORDER — AMLODIPINE BESYLATE 10 MG/1
10 TABLET ORAL DAILY
Qty: 90 TABLET | Refills: 1 | Status: SHIPPED | OUTPATIENT
Start: 2024-05-06

## 2024-07-07 NOTE — PROGRESS NOTES
Subjective   Dick Galindo is a 79 y.o. male.  Referred by Dr. Rodriguez for evaluation and management of anemia    History of Present Illness   Mr. Dubois is a 75-year-old  male with history of hypertension, hyperlipidemia, BPH status post TURP on 7/7/2020 presents for further evaluation of anemia.  He was scheduled for the TURP procedure on 7/7/2020 at which point a CBC was checked and his hemoglobin was noted to be low at 9.2.  This prompted further work-up with vitamin B12, folic acid, iron studies.  The vitamin B12 and folic acid levels were noted to be normal.  Iron studies showed a low ferritin consistent with iron deficiency.  He was then placed on oral iron which he has been taking for the past 2 weeks.  Today a repeat CBC shows an improvement in hemoglobin to 11.2.  He denies any lightheadedness, dizziness, chest pain, dyspnea, palpitations, lower extremity edema.  He reports having lost about 10 pounds over the past 2 weeks, he attributes this to changes in his diet as well as decreased appetite since the surgery.  He denies noting any blood in his stool any melena.  Denies any hematemesis.  He has been on Prevacid for a long time now for GERD.  His last colonoscopy was in 2018 on which there was diverticulosis, polyps and internal hemorrhoids noted.  EGD was also performed in 2018 on which mildly erythematous mucosa of the esophagus was noted but no other abnormalities.  He was scheduled for a colonoscopy in February 2020 by Dr. Roper however this has been delayed because of COVID and he plans to undergo colonoscopy soon.  6/3/2021 EGD-Z-line is irregular, 39 cm from the incisors.  Biopsied  Erythematous mucosa in the stomach biopsied.  No gross lesions in the entire examined duodenum.  Stomach biopsies consistent with scattered changes suggestive of mild reactive gastropathy.  GE junction biopsy with mild esophagitis and focal goblet cell metaplasia Castellon's esophagus.    Interval history  Ed  "returns today for lab review and follow-up.  He continues on oral iron 65 mg tablets, taking 2 tablets every other day.  He is taking an over-the-counter \"gentle release \"form of iron.  With this he has not had any issues with tolerance.  Energy level good.  Did have an episode of vertigo, aside from this has not had any dizziness or lightheadedness.  Denies shortness of breath.  Denies any signs or symptoms of bleeding.  No new concerns.    The following portions of the patient's history were reviewed and updated as appropriate: allergies, current medications, past family history, past medical history, past social history, past surgical history and problem list.    Past Medical History:   Diagnosis Date    Anxiety     Asthma     mild case, allergies    Castellon esophagus     Bronchitis     Colon polyps     Diverticulitis     Enlarged prostate     GERD (gastroesophageal reflux disease)     H/O bladder infections     H/O bronchitis     High cholesterol     History of anemia     Hypertension     Hypothyroidism     Seasonal allergies     Sleep apnea     CPAP    Slow urinary stream         Past Surgical History:   Procedure Laterality Date    COLONOSCOPY N/A approx 2012    COLONOSCOPY N/A 02/06/2018    Procedure: COLONOSCOPY INTO CEECUM AND TERMINAL MILEUM WITH COLD BIOPSY POLYPECTOMIES & COLD BIOIPSIES;  Surgeon: Usman Roper MD;  Location: [facility] ENDOSCOPY;  Service:     COLONOSCOPY N/A 12/03/2020    Procedure: COLONOSCOPY TO CECUM AND INTO TI WITH COLD BIOPSIES AND COLD BIOPSY POLYPECTOMY;  Surgeon: Usman Roper MD;  Location: [facility] ENDOSCOPY;  Service: Gastroenterology;  Laterality: N/A;  pre: history of colon polyps  post: diverticulosis, polyp, and internal hemorrhoids    COLONOSCOPY N/A 05/31/2022    Procedure: COLONOSCOPY to cecum and TI with biopsies and cold forcep polypectomies;  Surgeon: Usman Roper MD;  Location: [facility] ENDOSCOPY;  Service: Gastroenterology;  Laterality: N/A;  pre- hx of colon polyps, " melena, iron deficiency anemia  post- diverticulosis, polyps, polyp stalk, internal hemorrhoids    CYSTOSCOPY TRANSURETHRAL RESECTION OF PROSTATE N/A 2020    Procedure: TRANSURETHRAL RESECTION OF PROSTATE;  Surgeon: Charanjit Cameron MD;  Location: [facility] MAIN OR;  Service: Urology;  Laterality: N/A;    ENDOSCOPY N/A 2018    Procedure: ESOPHAGOGASTRODUODENOSCOPY WITH COLD BIOPSIES;  Surgeon: Usman Roper MD;  Location: [facility] ENDOSCOPY;  Service:     ENDOSCOPY N/A 2021    Procedure: ESOPHAGOGASTRODUODENOSCOPY WITH COLD BIOPSIES;  Surgeon: Usman Roper MD;  Location: [facility] ENDOSCOPY;  Service: Gastroenterology;  Laterality: N/A;  PRE:IRON DEFICIENCY ANEMIA  POST: GASTRITIS    ENDOSCOPY N/A 2022    Procedure: ESOPHAGOGASTRODUODENOSCOPY with biopsies;  Surgeon: Usman Roper MD;  Location: [facility] ENDOSCOPY;  Service: Gastroenterology;  Laterality: N/A;  pre- Castellon's esophagus  post- gastritis, gastric polyps    PROSTATE SURGERY      TONSILLECTOMY Bilateral     UPPER GASTROINTESTINAL ENDOSCOPY          Family History   Problem Relation Age of Onset    Stroke Father     Heart attack Father     Hearing loss Father     Hyperlipidemia Father     Hypertension Mother     No Known Problems Maternal Grandmother     No Known Problems Maternal Grandfather     No Known Problems Paternal Grandmother     No Known Problems Paternal Grandfather     Heart disease Brother     Malig Hyperthermia Neg Hx     Colon cancer Neg Hx     Colon polyps Neg Hx         Social History     Socioeconomic History    Marital status:      Spouse name: Leidy   Tobacco Use    Smoking status: Former     Current packs/day: 0.00     Average packs/day: 2.0 packs/day for 14.0 years (28.0 ttl pk-yrs)     Types: Cigarettes     Start date: 1966     Quit date: 1980     Years since quittin.5     Passive exposure: Past    Smokeless tobacco: Never    Tobacco comments:     He smoked between the ages of 18 and  "32.   Vaping Use    Vaping status: Never Used   Substance and Sexual Activity    Alcohol use: Yes     Alcohol/week: 4.0 standard drinks of alcohol     Types: 4 Glasses of wine per week     Comment: occ.    Drug use: No    Sexual activity: Yes     Partners: Female             Allergies   Allergen Reactions    Amoxicillin Itching and Swelling    Levaquin [Levofloxacin] Nausea And Vomiting        Review of systems as mentioned in the HPI otherwise negative    Objective   Blood pressure 151/84, pulse 67, temperature 98.5 °F (36.9 °C), temperature source Oral, resp. rate 16, height 167 cm (65.75\"), weight 105 kg (231 lb 12.8 oz), SpO2 97%.     Physical Exam   Constitutional: He is oriented to person, place, and time. No distress. He is obese.  HENT:   Head: Normocephalic.   Nose: Nose normal.   Mouth/Throat: Mucous membranes are moist. Oropharynx is clear.   Eyes: Pupils are equal, round, and reactive to light. Conjunctivae are normal.   Cardiovascular: Normal rate.   Pulmonary/Chest: Effort normal. No respiratory distress.   Neurological: He is oriented to person, place, and time. He displays no weakness.   Psychiatric: His behavior is normal. Mood, judgment and thought content normal.   Vitals reviewed.        No visits with results within 30 Day(s) from this visit.   Latest known visit with results is:   Lab on 01/04/2024   Component Date Value Ref Range Status    Ferritin 01/04/2024 95.70  30.00 - 400.00 ng/mL Final    Iron 01/04/2024 86  59 - 158 mcg/dL Final    Iron Saturation (TSAT) 01/04/2024 28  20 - 50 % Final    Transferrin 01/04/2024 205  200 - 360 mg/dL Final    TIBC 01/04/2024 305  298 - 536 mcg/dL Final    WBC 01/04/2024 7.50  3.40 - 10.80 10*3/mm3 Final    RBC 01/04/2024 4.85  4.14 - 5.80 10*6/mm3 Final    Hemoglobin 01/04/2024 14.7  13.0 - 17.7 g/dL Final    Hematocrit 01/04/2024 45.3  37.5 - 51.0 % Final    MCV 01/04/2024 93.4  79.0 - 97.0 fL Final    MCH 01/04/2024 30.3  26.6 - 33.0 pg Final    MCHC " 01/04/2024 32.5  31.5 - 35.7 g/dL Final    RDW 01/04/2024 12.3  12.3 - 15.4 % Final    RDW-SD 01/04/2024 42.4  37.0 - 54.0 fl Final    MPV 01/04/2024 8.7  6.0 - 12.0 fL Final    Platelets 01/04/2024 249  140 - 450 10*3/mm3 Final    Neutrophil % 01/04/2024 67.8  42.7 - 76.0 % Final    Lymphocyte % 01/04/2024 20.1  19.6 - 45.3 % Final    Monocyte % 01/04/2024 8.8  5.0 - 12.0 % Final    Eosinophil % 01/04/2024 2.1  0.3 - 6.2 % Final    Basophil % 01/04/2024 0.9  0.0 - 1.5 % Final    Immature Grans % 01/04/2024 0.3  0.0 - 0.5 % Final    Neutrophils, Absolute 01/04/2024 5.08  1.70 - 7.00 10*3/mm3 Final    Lymphocytes, Absolute 01/04/2024 1.51  0.70 - 3.10 10*3/mm3 Final    Monocytes, Absolute 01/04/2024 0.66  0.10 - 0.90 10*3/mm3 Final    Eosinophils, Absolute 01/04/2024 0.16  0.00 - 0.40 10*3/mm3 Final    Basophils, Absolute 01/04/2024 0.07  0.00 - 0.20 10*3/mm3 Final    Immature Grans, Absolute 01/04/2024 0.02  0.00 - 0.05 10*3/mm3 Final    nRBC 01/04/2024 0.0  0.0 - 0.2 /100 WBC Final        No radiology results for the last 30 days.     1/4/2024-WBC 7.5, hemoglobin 14.7, platelets 249,000  Ferritin 95.7, iron profile reviewed and iron saturation 28%, TIBC 305    Assessment & Plan      Mr. Holder is a 76-year-old  male referred for further work-up of anemia.  On reviewing the labs he is noted to have low hemoglobin since May 2019.  Ferritin was normal in the past however most recent ferritin from 7/8/2020 noted to be low at 12.  This is indicative of iron deficiency.  He has been on oral iron and has had a good response.    1.anemia  Work-up consistent with iron deficiency   Had a colonoscopy in December 2020 which does not show any evidence of bleeding  EGD June 2021 with esophagitis and Castellon's esophagus.  He had some hemorrhoidal bleeding with stopped about 3 months ago.  He continues on oral iron every other day.  Hemoglobin today is normal at 14.1.  Ferritin normal at 36.1, iron profile shows an iron  saturation of 21% and TIBC 337  Has not been on oral iron for about 4 to 5 months now.  Hemoglobin decreased to 12.1 and therefore oral iron resumed.  Repeat EGD and C-scope 5/31/2022 without any evidence of bleeding  Continue oral iron.  Recommend taking 2 daily.  1/5/2023: Taking oral iron 2 tablets every other day, tolerating well.  Hemoglobin today 14.6, ferritin 72.3, iron saturation 27%, TIBC 329.  Capsule endoscopy with GI 11/2022 came back normal, no source of anemia identified.  3/30/2023-CBC reviewed and WBC 7.97, hemoglobin 14.3 and platelets 215,000.  Iron saturation 25%, TIBC 289.  Ferritin 112.3  7/11/2024: Continues on oral iron 2 tablets every other day.  Hemoglobin 14.2, ferritin 95.5, iron saturation 26%, TIBC 304.    2.Hypertension-blood pressure well controlled, continue current medications.  BP: 151/84 (didn't take AM meds)     3.GI work-up for iron deficiency anemia-with C-scope and EGD performed 5/31/2022 negative.  Capsule endoscopy with GI 11/28/2022 came back normal, no source of anemia.  Hemoglobin remains normal today at 14.2.    4.Hyperlipidemia , continue atorvastatin, stable    5.Mood-continue Zoloft, stable    6.Obesity-BMI 37.8    PLAN:    Continue oral iron 2 tablets every other day.  6 months MD with CBC, ferritin, iron profile.    JEFF Mayers  07/11/24

## 2024-07-11 ENCOUNTER — LAB (OUTPATIENT)
Dept: OTHER | Facility: HOSPITAL | Age: 79
End: 2024-07-11
Payer: MEDICARE

## 2024-07-11 ENCOUNTER — OFFICE VISIT (OUTPATIENT)
Dept: ONCOLOGY | Facility: CLINIC | Age: 79
End: 2024-07-11
Payer: MEDICARE

## 2024-07-11 VITALS
WEIGHT: 231.8 LBS | DIASTOLIC BLOOD PRESSURE: 84 MMHG | RESPIRATION RATE: 16 BRPM | HEIGHT: 66 IN | SYSTOLIC BLOOD PRESSURE: 151 MMHG | BODY MASS INDEX: 37.25 KG/M2 | TEMPERATURE: 98.5 F | HEART RATE: 67 BPM | OXYGEN SATURATION: 97 %

## 2024-07-11 DIAGNOSIS — D50.0 IRON DEFICIENCY ANEMIA DUE TO CHRONIC BLOOD LOSS: Primary | ICD-10-CM

## 2024-07-11 DIAGNOSIS — D50.0 IRON DEFICIENCY ANEMIA DUE TO CHRONIC BLOOD LOSS: ICD-10-CM

## 2024-07-11 LAB
BASOPHILS # BLD AUTO: 0.11 10*3/MM3 (ref 0–0.2)
BASOPHILS NFR BLD AUTO: 1.5 % (ref 0–1.5)
DEPRECATED RDW RBC AUTO: 42.3 FL (ref 37–54)
EOSINOPHIL # BLD AUTO: 0.24 10*3/MM3 (ref 0–0.4)
EOSINOPHIL NFR BLD AUTO: 3.2 % (ref 0.3–6.2)
ERYTHROCYTE [DISTWIDTH] IN BLOOD BY AUTOMATED COUNT: 12.8 % (ref 12.3–15.4)
FERRITIN SERPL-MCNC: 95.5 NG/ML (ref 30–400)
HCT VFR BLD AUTO: 43 % (ref 37.5–51)
HGB BLD-MCNC: 14.2 G/DL (ref 13–17.7)
IMM GRANULOCYTES # BLD AUTO: 0.03 10*3/MM3 (ref 0–0.05)
IMM GRANULOCYTES NFR BLD AUTO: 0.4 % (ref 0–0.5)
IRON 24H UR-MRATE: 80 MCG/DL (ref 59–158)
IRON SATN MFR SERPL: 26 % (ref 20–50)
LYMPHOCYTES # BLD AUTO: 1.72 10*3/MM3 (ref 0.7–3.1)
LYMPHOCYTES NFR BLD AUTO: 23.1 % (ref 19.6–45.3)
MCH RBC QN AUTO: 30 PG (ref 26.6–33)
MCHC RBC AUTO-ENTMCNC: 33 G/DL (ref 31.5–35.7)
MCV RBC AUTO: 90.9 FL (ref 79–97)
MONOCYTES # BLD AUTO: 0.71 10*3/MM3 (ref 0.1–0.9)
MONOCYTES NFR BLD AUTO: 9.6 % (ref 5–12)
NEUTROPHILS NFR BLD AUTO: 4.62 10*3/MM3 (ref 1.7–7)
NEUTROPHILS NFR BLD AUTO: 62.2 % (ref 42.7–76)
NRBC BLD AUTO-RTO: 0 /100 WBC (ref 0–0.2)
PLATELET # BLD AUTO: 222 10*3/MM3 (ref 140–450)
PMV BLD AUTO: 8.8 FL (ref 6–12)
RBC # BLD AUTO: 4.73 10*6/MM3 (ref 4.14–5.8)
TIBC SERPL-MCNC: 304 MCG/DL (ref 298–536)
TRANSFERRIN SERPL-MCNC: 204 MG/DL (ref 200–360)
WBC NRBC COR # BLD AUTO: 7.43 10*3/MM3 (ref 3.4–10.8)

## 2024-07-11 PROCEDURE — 84466 ASSAY OF TRANSFERRIN: CPT | Performed by: INTERNAL MEDICINE

## 2024-07-11 PROCEDURE — 82728 ASSAY OF FERRITIN: CPT | Performed by: INTERNAL MEDICINE

## 2024-07-11 PROCEDURE — 85025 COMPLETE CBC W/AUTO DIFF WBC: CPT | Performed by: INTERNAL MEDICINE

## 2024-07-11 PROCEDURE — 83540 ASSAY OF IRON: CPT | Performed by: INTERNAL MEDICINE

## 2024-07-11 PROCEDURE — 36415 COLL VENOUS BLD VENIPUNCTURE: CPT

## 2024-07-12 ENCOUNTER — TELEPHONE (OUTPATIENT)
Dept: ONCOLOGY | Facility: CLINIC | Age: 79
End: 2024-07-12
Payer: MEDICARE

## 2024-07-12 NOTE — TELEPHONE ENCOUNTER
Patient advised iron studies are normal and continue current dose of iron. Left message on answering machine and call if questions.

## 2024-07-25 ENCOUNTER — OFFICE VISIT (OUTPATIENT)
Dept: FAMILY MEDICINE CLINIC | Facility: CLINIC | Age: 79
End: 2024-07-25
Payer: MEDICARE

## 2024-07-25 VITALS
TEMPERATURE: 97.7 F | WEIGHT: 235.5 LBS | OXYGEN SATURATION: 98 % | BODY MASS INDEX: 37.85 KG/M2 | SYSTOLIC BLOOD PRESSURE: 145 MMHG | HEART RATE: 63 BPM | HEIGHT: 66 IN | DIASTOLIC BLOOD PRESSURE: 64 MMHG

## 2024-07-25 DIAGNOSIS — E78.01 FAMILIAL HYPERCHOLESTEROLEMIA: ICD-10-CM

## 2024-07-25 DIAGNOSIS — I10 ESSENTIAL HYPERTENSION: Primary | ICD-10-CM

## 2024-07-25 DIAGNOSIS — E03.8 SUBCLINICAL HYPOTHYROIDISM: ICD-10-CM

## 2024-07-25 DIAGNOSIS — F33.42 RECURRENT MAJOR DEPRESSIVE DISORDER, IN FULL REMISSION: ICD-10-CM

## 2024-07-25 DIAGNOSIS — D50.9 IRON DEFICIENCY ANEMIA, UNSPECIFIED IRON DEFICIENCY ANEMIA TYPE: Chronic | ICD-10-CM

## 2024-07-25 PROCEDURE — 99214 OFFICE O/P EST MOD 30 MIN: CPT | Performed by: FAMILY MEDICINE

## 2024-07-25 PROCEDURE — 1126F AMNT PAIN NOTED NONE PRSNT: CPT | Performed by: FAMILY MEDICINE

## 2024-07-25 PROCEDURE — 3078F DIAST BP <80 MM HG: CPT | Performed by: FAMILY MEDICINE

## 2024-07-25 PROCEDURE — 3077F SYST BP >= 140 MM HG: CPT | Performed by: FAMILY MEDICINE

## 2024-07-25 NOTE — PROGRESS NOTES
"Chief Complaint  Hypertension    Subjective        Dick Galindo presents to [facility]  History of Present Illness    Hypertension follow-up.  Blood pressure slightly elevated today 145/64.  Continues on amlodipine 10 mg a day, carvedilol 6.25 mg twice a day, hydrochlorothiazide 12.5 mg a day, and lisinopril 40 mg a day.  Recent creatinine and potassium and sodium normal.  He checks his blood pressure at home.  Runs between 120s to 130s.  Occasionally in the 140s.    Hyperlipidemia.  Continues on 40 mg of atorvastatin daily without adverse effect.  Lipid panel remains favorable on medication.    Hypothyroidism.  Continues levothyroxine 75 mcg daily.  TSH 6 months ago was therapeutic.    Major depression.  Continues on maintenance sertraline 50 mg daily.  No depression exacerbation.  He keeps busy caring for his grandchildren.    Iron deficiency anemia.  Negative workup a few years ago.  Continues on maintenance iron supplementation.  Recent hemoglobin normal.  I also reviewed his recent hematology visit.        Objective   Vital Signs:  /64   Pulse 63   Temp 97.7 °F (36.5 °C) (Temporal)   Ht 167 cm (65.75\")   Wt 107 kg (235 lb 8 oz)   SpO2 98%   BMI 38.30 kg/m²   Estimated body mass index is 38.3 kg/m² as calculated from the following:    Height as of this encounter: 167 cm (65.75\").    Weight as of this encounter: 107 kg (235 lb 8 oz).               Physical Exam  Vitals and nursing note reviewed.   Constitutional:       General: He is not in acute distress.     Appearance: He is well-developed.   Eyes:      Conjunctiva/sclera: Conjunctivae normal.   Neck:      Thyroid: No thyromegaly.   Cardiovascular:      Rate and Rhythm: Normal rate and regular rhythm.      Heart sounds: Normal heart sounds.   Pulmonary:      Effort: Pulmonary effort is normal. No respiratory distress.      Breath sounds: Normal breath sounds.   Musculoskeletal:      Cervical back: Normal range of " motion.   Lymphadenopathy:      Cervical: No cervical adenopathy.   Skin:     General: Skin is warm and dry.   Psychiatric:         Mood and Affect: Mood normal.        Result Review :    The following data was reviewed by: Mynor Russ MD on 07/25/2024:  Common labs          1/8/2024    08:41 7/11/2024    09:58 7/17/2024    09:23   Common Labs   Glucose 98   98    BUN 23   22    Creatinine 0.96   1.19    Sodium 141   140    Potassium 4.1   4.3    Chloride 101   98    Calcium 9.2   9.3    Total Protein 6.7   7.1    Albumin 4.2   4.4    Total Bilirubin 0.6   0.6    Alkaline Phosphatase 92   90    AST (SGOT) 21   26    ALT (SGPT) 23   20    WBC 8.01  7.43  7.71    Hemoglobin 15.0  14.2  15.2    Hematocrit 43.9  43.0  45.3    Platelets 249  222  248    Total Cholesterol 140   135    Triglycerides 79   93    HDL Cholesterol 55   52    LDL Cholesterol  70   65      TSH          1/8/2024    08:41   TSH   TSH 2.300                     Assessment and Plan     Diagnoses and all orders for this visit:    1. Essential hypertension (Primary)  -     CBC & Differential; Future  -     Comprehensive Metabolic Panel; Future  -     Lipid Panel; Future  -     TSH Rfx On Abnormal To Free T4; Future    2. Familial hypercholesterolemia  -     CBC & Differential; Future  -     Comprehensive Metabolic Panel; Future  -     Lipid Panel; Future  -     TSH Rfx On Abnormal To Free T4; Future    3. Subclinical hypothyroidism  -     CBC & Differential; Future  -     Comprehensive Metabolic Panel; Future  -     Lipid Panel; Future  -     TSH Rfx On Abnormal To Free T4; Future    4. Recurrent major depressive disorder, in full remission    5. Iron deficiency anemia, unspecified iron deficiency anemia type    Hypertension.  Acceptable control.  I have asked him to check his blood pressure more frequently at home for the next few weeks.  If consistently in the 140s or more, we can increase the diuretic otherwise continue medication as is.  See me  in 6 months Medicare wellness visit.    Hyperlipidemia.  Continue statin.    Hypothyroidism.  Continue levothyroxine.  Check TSH prior to next visit.    Major depression.  In remission.  Continues on maintenance sertraline.    Iron deficiency anemia.  Negative GI workup and hematological workup.  Continues to follow with his hematologist.  Continues on iron supplementation.  He will also be seeing his GI doctor in coming months.         Follow Up     Return in about 6 months (around 1/25/2025) for Subsequent Medicare Wellness Visit, Lab Visit One Week Prior to Next Appointment.  Patient was given instructions and counseling regarding his condition or for health maintenance advice. Please see specific information pulled into the AVS if appropriate.

## 2024-08-19 RX ORDER — ATORVASTATIN CALCIUM 40 MG/1
40 TABLET, FILM COATED ORAL DAILY
Qty: 90 TABLET | Refills: 1 | Status: SHIPPED | OUTPATIENT
Start: 2024-08-19

## 2024-08-19 RX ORDER — LISINOPRIL 40 MG/1
40 TABLET ORAL DAILY
Qty: 90 TABLET | Refills: 1 | Status: SHIPPED | OUTPATIENT
Start: 2024-08-19

## 2024-08-19 NOTE — TELEPHONE ENCOUNTER
Rx Refill Note  Requested Prescriptions     Pending Prescriptions Disp Refills    atorvastatin (LIPITOR) 40 MG tablet 90 tablet 1     Sig: Take 1 tablet by mouth Daily.    lisinopril (PRINIVIL,ZESTRIL) 40 MG tablet 90 tablet 1     Sig: Take 1 tablet by mouth Daily.      Last office visit with prescribing clinician: 7/25/2024   Last telemedicine visit with prescribing clinician: Visit date not found   Next office visit with prescribing clinician: 1/30/2025                         Would you like a call back once the refill request has been completed: [] Yes [] No    If the office needs to give you a call back, can they leave a voicemail: [] Yes [] No    Sarath Friend  08/19/24, 13:33 EDT

## 2024-10-07 RX ORDER — AMLODIPINE BESYLATE 10 MG/1
10 TABLET ORAL DAILY
Qty: 90 TABLET | Refills: 1 | Status: SHIPPED | OUTPATIENT
Start: 2024-10-07

## 2024-10-07 RX ORDER — CARVEDILOL 6.25 MG/1
6.25 TABLET ORAL 2 TIMES DAILY WITH MEALS
Qty: 180 TABLET | Refills: 1 | Status: SHIPPED | OUTPATIENT
Start: 2024-10-07

## 2024-10-07 RX ORDER — HYDROCHLOROTHIAZIDE 12.5 MG/1
12.5 TABLET ORAL DAILY
Qty: 90 TABLET | Refills: 1 | Status: SHIPPED | OUTPATIENT
Start: 2024-10-07

## 2024-10-07 RX ORDER — LEVOTHYROXINE SODIUM 75 UG/1
75 TABLET ORAL DAILY
Qty: 90 TABLET | Refills: 1 | Status: SHIPPED | OUTPATIENT
Start: 2024-10-07

## 2024-10-07 NOTE — TELEPHONE ENCOUNTER
Rx Refill Note  Requested Prescriptions     Pending Prescriptions Disp Refills    sertraline (ZOLOFT) 50 MG tablet 90 tablet 1     Sig: Take 1 tablet by mouth Daily.    hydroCHLOROthiazide 12.5 MG tablet 90 tablet 1     Sig: Take 1 tablet by mouth Daily.    levothyroxine (SYNTHROID, LEVOTHROID) 75 MCG tablet 90 tablet 1     Sig: Take 1 tablet by mouth Daily.    carvedilol (COREG) 6.25 MG tablet 180 tablet 1     Sig: Take 1 tablet by mouth 2 (Two) Times a Day With Meals.    amLODIPine (NORVASC) 10 MG tablet 90 tablet 1     Sig: Take 1 tablet by mouth Daily.      Last office visit with prescribing clinician: 7/25/2024   Last telemedicine visit with prescribing clinician: Visit date not found   Next office visit with prescribing clinician: 1/30/2025                         Would you like a call back once the refill request has been completed: [] Yes [] No    If the office needs to give you a call back, can they leave a voicemail: [] Yes [] No    Sarath Friend  10/07/24, 08:36 EDT

## 2024-11-07 DIAGNOSIS — J20.9 BRONCHOSPASM WITH BRONCHITIS, ACUTE: ICD-10-CM

## 2024-11-07 NOTE — TELEPHONE ENCOUNTER
Rx Refill Note  Requested Prescriptions     Pending Prescriptions Disp Refills    albuterol sulfate HFA (Ventolin HFA) 108 (90 Base) MCG/ACT inhaler 18 g 1     Si puffs Q4H for cough and wheeze      Last office visit with prescribing clinician: 2024   Last telemedicine visit with prescribing clinician: Visit date not found   Next office visit with prescribing clinician: 2025                         Would you like a call back once the refill request has been completed: [] Yes [] No    If the office needs to give you a call back, can they leave a voicemail: [] Yes [] No    Sarath Friend  24, 13:52 EST

## 2024-11-08 RX ORDER — ALBUTEROL SULFATE 90 UG/1
INHALANT RESPIRATORY (INHALATION)
Qty: 18 G | Refills: 1 | Status: SHIPPED | OUTPATIENT
Start: 2024-11-08

## 2024-12-19 ENCOUNTER — OFFICE VISIT (OUTPATIENT)
Dept: SLEEP MEDICINE | Facility: HOSPITAL | Age: 79
End: 2024-12-19
Payer: MEDICARE

## 2024-12-19 VITALS — BODY MASS INDEX: 37.77 KG/M2 | WEIGHT: 235 LBS | HEIGHT: 66 IN | OXYGEN SATURATION: 94 % | HEART RATE: 65 BPM

## 2024-12-19 DIAGNOSIS — E66.01 CLASS 2 SEVERE OBESITY DUE TO EXCESS CALORIES WITH SERIOUS COMORBIDITY AND BODY MASS INDEX (BMI) OF 38.0 TO 38.9 IN ADULT: ICD-10-CM

## 2024-12-19 DIAGNOSIS — E66.812 CLASS 2 SEVERE OBESITY DUE TO EXCESS CALORIES WITH SERIOUS COMORBIDITY AND BODY MASS INDEX (BMI) OF 38.0 TO 38.9 IN ADULT: ICD-10-CM

## 2024-12-19 DIAGNOSIS — G47.33 OSA ON CPAP: ICD-10-CM

## 2024-12-19 DIAGNOSIS — G47.14 HYPERSOMNIA DUE TO MEDICAL CONDITION: ICD-10-CM

## 2024-12-19 DIAGNOSIS — G47.36 HYPOXEMIA ASSOCIATED WITH SLEEP: Primary | ICD-10-CM

## 2024-12-19 PROCEDURE — G0463 HOSPITAL OUTPT CLINIC VISIT: HCPCS

## 2024-12-19 NOTE — PROGRESS NOTES
"[facility] Note     Chief Complaint:  ORMINA     Primary Care Physician: Mynor Russ MD    Interval History:   The patient is a 79 y.o. male who I last saw 2023 and that note was reviewed.  The patient reports he is doing well without new complaints.  He goes to bed 11 PM gets out of bed at 7:30 AM.    Review of Systems:    A complete review of systems was done and all were negative with the exception of the above    Social History:    Social History     Socioeconomic History    Marital status:      Spouse name: Leidy   Tobacco Use    Smoking status: Former     Current packs/day: 0.00     Average packs/day: 2.0 packs/day for 14.0 years (28.0 ttl pk-yrs)     Types: Cigarettes     Start date: 1966     Quit date: 1980     Years since quittin.9     Passive exposure: Past    Smokeless tobacco: Never    Tobacco comments:     He smoked between the ages of 18 and 32.   Vaping Use    Vaping status: Never Used   Substance and Sexual Activity    Alcohol use: Yes     Alcohol/week: 4.0 standard drinks of alcohol     Types: 4 Glasses of wine per week     Comment: occ.    Drug use: No    Sexual activity: Yes     Partners: Female       Allergies:  Amoxicillin and Levaquin [levofloxacin]     Medication Review:  Reviewed.      Vital Signs:    Vitals:    24 1030   Pulse: 65   SpO2: 94%   Weight: 107 kg (235 lb)   Height: 167 cm (65.75\")     Body mass index is 38.22 kg/m².    Physical Exam:    Constitutional:  Well developed 79 y.o. male that appears in no apparent distress.  Awake & oriented times 3.  Normal mood with normal recent and remote memory and normal judgement.  Eyes:  Conjunctivae normal.  Oropharynx: Previously, moist mucous membranes without exudate and a large tongue and normal uvula and narrowed posterior pharyngeal opening and class II-3 Mallampati airway.    Self-administered [place] Sleepiness Scale test results: 8, previously 6  0-5 Lower normal daytime " sleepiness  6-10 Higher normal daytime sleepiness  11-12 Mild, 13-15 Moderate, & 16-24 Severe excessive daytime sleepiness     Downloaded PAP Data Evaluated For Therapeutic Response and Compliance:  DME is [facility] and the patient uses a fullface mask.  Downloads between 9/19 and 12/17/2024 compliance 99%.  Average usage is 7 hours and 24 minutes.  Average AHI is normal at 5.1 without leak.  Average auto CPAP pressure is 10.1 and his DreamStation 2 is set at 8-20    I have reviewed the above results and compared them with the patient's last downloads and reviewed with the patient.    Impression:   Severe obstructive sleep apnea with sleep-related hypoxia by home sleep study 4/7/2018, weight 243 pounds adequately treated with DreamStation 2 auto CPAP. The patient appears to be at goal with good compliance and usage. The patient has some complaints of hypersomnolence possibly due to decreased sleep hours on CPAP..     Plan:  Good sleep hygiene measures should be maintained.  Weight loss would be beneficial in this patient who has class II severe obesity by Body mass index is 38.22 kg/m².      After evaluating the patient and assessing results available, the patient is benefiting from the treatment being provided.     The patient will continue DreamStation 2 auto CPAP.  Potential side effects of not using PAP therapy reviewed and addressed as needed.  After clinical evaluation and review of downloads, I recommend no changes to the patient's pressures.  A new prescription will be sent to the patient's DME.    I answered all of the patient's questions.  The patient will call the [facility] for any problems and will follow up in 1 year.      Kendrick Diaz MD  Sleep Medicine  12/19/24  10:49 EST

## 2024-12-27 ENCOUNTER — TELEPHONE (OUTPATIENT)
Dept: ONCOLOGY | Facility: CLINIC | Age: 79
End: 2024-12-27
Payer: MEDICARE

## 2025-01-21 ENCOUNTER — OFFICE VISIT (OUTPATIENT)
Dept: ONCOLOGY | Facility: CLINIC | Age: 80
End: 2025-01-21
Payer: MEDICARE

## 2025-01-21 ENCOUNTER — LAB (OUTPATIENT)
Dept: OTHER | Facility: HOSPITAL | Age: 80
End: 2025-01-21
Payer: MEDICARE

## 2025-01-21 VITALS
WEIGHT: 232.9 LBS | HEIGHT: 66 IN | BODY MASS INDEX: 37.43 KG/M2 | HEART RATE: 55 BPM | SYSTOLIC BLOOD PRESSURE: 147 MMHG | RESPIRATION RATE: 16 BRPM | OXYGEN SATURATION: 96 % | DIASTOLIC BLOOD PRESSURE: 74 MMHG | TEMPERATURE: 97.5 F

## 2025-01-21 DIAGNOSIS — D50.0 IRON DEFICIENCY ANEMIA DUE TO CHRONIC BLOOD LOSS: Primary | ICD-10-CM

## 2025-01-21 DIAGNOSIS — D50.0 IRON DEFICIENCY ANEMIA DUE TO CHRONIC BLOOD LOSS: ICD-10-CM

## 2025-01-21 LAB
BASOPHILS # BLD AUTO: 0.1 10*3/MM3 (ref 0–0.2)
BASOPHILS NFR BLD AUTO: 1.3 % (ref 0–1.5)
DEPRECATED RDW RBC AUTO: 43.4 FL (ref 37–54)
EOSINOPHIL # BLD AUTO: 0.23 10*3/MM3 (ref 0–0.4)
EOSINOPHIL NFR BLD AUTO: 3.1 % (ref 0.3–6.2)
ERYTHROCYTE [DISTWIDTH] IN BLOOD BY AUTOMATED COUNT: 12.5 % (ref 12.3–15.4)
FERRITIN SERPL-MCNC: 73.6 NG/ML (ref 30–400)
HCT VFR BLD AUTO: 45.3 % (ref 37.5–51)
HGB BLD-MCNC: 14.6 G/DL (ref 13–17.7)
IMM GRANULOCYTES # BLD AUTO: 0.01 10*3/MM3 (ref 0–0.05)
IMM GRANULOCYTES NFR BLD AUTO: 0.1 % (ref 0–0.5)
IRON 24H UR-MRATE: 107 MCG/DL (ref 59–158)
IRON SATN MFR SERPL: 34 % (ref 20–50)
LYMPHOCYTES # BLD AUTO: 2.08 10*3/MM3 (ref 0.7–3.1)
LYMPHOCYTES NFR BLD AUTO: 28 % (ref 19.6–45.3)
MCH RBC QN AUTO: 30.3 PG (ref 26.6–33)
MCHC RBC AUTO-ENTMCNC: 32.2 G/DL (ref 31.5–35.7)
MCV RBC AUTO: 94 FL (ref 79–97)
MONOCYTES # BLD AUTO: 0.66 10*3/MM3 (ref 0.1–0.9)
MONOCYTES NFR BLD AUTO: 8.9 % (ref 5–12)
NEUTROPHILS NFR BLD AUTO: 4.34 10*3/MM3 (ref 1.7–7)
NEUTROPHILS NFR BLD AUTO: 58.6 % (ref 42.7–76)
NRBC BLD AUTO-RTO: 0 /100 WBC (ref 0–0.2)
PLATELET # BLD AUTO: 227 10*3/MM3 (ref 140–450)
PMV BLD AUTO: 9.2 FL (ref 6–12)
RBC # BLD AUTO: 4.82 10*6/MM3 (ref 4.14–5.8)
TIBC SERPL-MCNC: 311 MCG/DL (ref 298–536)
TRANSFERRIN SERPL-MCNC: 209 MG/DL (ref 200–360)
WBC NRBC COR # BLD AUTO: 7.42 10*3/MM3 (ref 3.4–10.8)

## 2025-01-21 PROCEDURE — 36415 COLL VENOUS BLD VENIPUNCTURE: CPT

## 2025-01-21 PROCEDURE — 83540 ASSAY OF IRON: CPT | Performed by: INTERNAL MEDICINE

## 2025-01-21 PROCEDURE — 85025 COMPLETE CBC W/AUTO DIFF WBC: CPT | Performed by: INTERNAL MEDICINE

## 2025-01-21 PROCEDURE — 82728 ASSAY OF FERRITIN: CPT | Performed by: INTERNAL MEDICINE

## 2025-01-21 PROCEDURE — 84466 ASSAY OF TRANSFERRIN: CPT | Performed by: INTERNAL MEDICINE

## 2025-01-21 NOTE — PROGRESS NOTES
Subjective   Dick Galindo is a 79 y.o. male.  Referred by Dr. Rodriguez for evaluation and management of anemia    History of Present Illness   Mr. Dubois is a 75-year-old  male with history of hypertension, hyperlipidemia, BPH status post TURP on 7/7/2020 presents for further evaluation of anemia.  He was scheduled for the TURP procedure on 7/7/2020 at which point a CBC was checked and his hemoglobin was noted to be low at 9.2.  This prompted further work-up with vitamin B12, folic acid, iron studies.  The vitamin B12 and folic acid levels were noted to be normal.  Iron studies showed a low ferritin consistent with iron deficiency.  He was then placed on oral iron which he has been taking for the past 2 weeks.  Today a repeat CBC shows an improvement in hemoglobin to 11.2.  He denies any lightheadedness, dizziness, chest pain, dyspnea, palpitations, lower extremity edema.  He reports having lost about 10 pounds over the past 2 weeks, he attributes this to changes in his diet as well as decreased appetite since the surgery.  He denies noting any blood in his stool any melena.  Denies any hematemesis.  He has been on Prevacid for a long time now for GERD.  His last colonoscopy was in 2018 on which there was diverticulosis, polyps and internal hemorrhoids noted.  EGD was also performed in 2018 on which mildly erythematous mucosa of the esophagus was noted but no other abnormalities.  He was scheduled for a colonoscopy in February 2020 by Dr. Roper however this has been delayed because of COVID and he plans to undergo colonoscopy soon.  6/3/2021 EGD-Z-line is irregular, 39 cm from the incisors.  Biopsied  Erythematous mucosa in the stomach biopsied.  No gross lesions in the entire examined duodenum.  Stomach biopsies consistent with scattered changes suggestive of mild reactive gastropathy.  GE junction biopsy with mild esophagitis and focal goblet cell metaplasia Castellon's esophagus.    Interval history  Ed  returns today for a follow-up.  He continues taking iron 2 tablets every other day.  He tolerates that well.  He is due for repeat colonoscopy and an EGD in the summer 2025.  Since Dr. Roper has retired he plans to get with the office to schedule with someone else.  He denies any blood in stool or dark-colored stool.  Appetite is good and weight remains stable.  No other changes in medical history.  CBC today with a normal hemoglobin of 14.6, ferritin is normal at 73.6, iron saturation 34% and TIBC 311.    The following portions of the patient's history were reviewed and updated as appropriate: allergies, current medications, past family history, past medical history, past social history, past surgical history and problem list.    Past Medical History:   Diagnosis Date    Anxiety     Asthma     mild case, allergies    Castellon esophagus     Bronchitis     Colon polyps     Diverticulitis     Enlarged prostate     GERD (gastroesophageal reflux disease)     H/O bladder infections     H/O bronchitis     High cholesterol     History of anemia     Hypertension     Hypothyroidism     Seasonal allergies     Sleep apnea     CPAP    Slow urinary stream         Past Surgical History:   Procedure Laterality Date    COLONOSCOPY N/A approx 2012    COLONOSCOPY N/A 02/06/2018    Procedure: COLONOSCOPY INTO CEECUM AND TERMINAL MILEUM WITH COLD BIOPSY POLYPECTOMIES & COLD BIOIPSIES;  Surgeon: Usman Roper MD;  Location: [facility];  Service:     COLONOSCOPY N/A 12/03/2020    Procedure: COLONOSCOPY TO CECUM AND INTO TI WITH COLD BIOPSIES AND COLD BIOPSY POLYPECTOMY;  Surgeon: Usman Roper MD;  Location: [facility] ENDOSCOPY;  Service: Gastroenterology;  Laterality: N/A;  pre: history of colon polyps  post: diverticulosis, polyp, and internal hemorrhoids    COLONOSCOPY N/A 05/31/2022    Procedure: COLONOSCOPY to cecum and TI with biopsies and cold forcep polypectomies;  Surgeon: Usman Roper MD;  Location: [facility];   Service: Gastroenterology;  Laterality: N/A;  pre- hx of colon polyps, melena, iron deficiency anemia  post- diverticulosis, polyps, polyp stalk, internal hemorrhoids    CYSTOSCOPY TRANSURETHRAL RESECTION OF PROSTATE N/A 2020    Procedure: TRANSURETHRAL RESECTION OF PROSTATE;  Surgeon: Charanijt Cameron MD;  Location: [facility] MAIN OR;  Service: Urology;  Laterality: N/A;    ENDOSCOPY N/A 2018    Procedure: ESOPHAGOGASTRODUODENOSCOPY WITH COLD BIOPSIES;  Surgeon: Usman Roper MD;  Location: [facility] ENDOSCOPY;  Service:     ENDOSCOPY N/A 2021    Procedure: ESOPHAGOGASTRODUODENOSCOPY WITH COLD BIOPSIES;  Surgeon: Usman Roper MD;  Location: [facility] ENDOSCOPY;  Service: Gastroenterology;  Laterality: N/A;  PRE:IRON DEFICIENCY ANEMIA  POST: GASTRITIS    ENDOSCOPY N/A 2022    Procedure: ESOPHAGOGASTRODUODENOSCOPY with biopsies;  Surgeon: Usman Roper MD;  Location: [facility] ENDOSCOPY;  Service: Gastroenterology;  Laterality: N/A;  pre- Castellon's esophagus  post- gastritis, gastric polyps    PROSTATE SURGERY      TONSILLECTOMY Bilateral     UPPER GASTROINTESTINAL ENDOSCOPY          Family History   Problem Relation Age of Onset    Stroke Father     Heart attack Father     Hearing loss Father     Hyperlipidemia Father     Hypertension Mother     No Known Problems Maternal Grandmother     No Known Problems Maternal Grandfather     No Known Problems Paternal Grandmother     No Known Problems Paternal Grandfather     Heart disease Brother     Malig Hyperthermia Neg Hx     Colon cancer Neg Hx     Colon polyps Neg Hx         Social History     Socioeconomic History    Marital status:      Spouse name: Leidy   Tobacco Use    Smoking status: Former     Current packs/day: 0.00     Average packs/day: 2.0 packs/day for 14.0 years (28.0 ttl pk-yrs)     Types: Cigarettes     Start date: 1966     Quit date: 1980     Years since quittin.0     Passive exposure: Past    Smokeless  "tobacco: Never    Tobacco comments:     He smoked between the ages of 18 and 32.   Vaping Use    Vaping status: Never Used   Substance and Sexual Activity    Alcohol use: Yes     Alcohol/week: 4.0 standard drinks of alcohol     Types: 4 Glasses of wine per week     Comment: occ.    Drug use: No    Sexual activity: Yes     Partners: Female             Allergies   Allergen Reactions    Amoxicillin Itching and Swelling    Levaquin [Levofloxacin] Nausea And Vomiting        Review of systems as mentioned in the HPI otherwise negative    Objective   Blood pressure 147/74, pulse 55, temperature 97.5 °F (36.4 °C), temperature source Temporal, resp. rate 16, height 167 cm (65.75\"), weight 106 kg (232 lb 14.4 oz), SpO2 96%.     Physical Exam   Constitutional: He is oriented to person, place, and time. No distress. He is obese.  HENT:   Head: Normocephalic.   Nose: Nose normal. Mouth/Throat: Mucous membranes are moist. Oropharynx is clear.   Eyes: Pupils are equal, round, and reactive to light. Conjunctivae are normal.   Cardiovascular: Normal rate.   Pulmonary/Chest: Effort normal. No respiratory distress.   Neurological: He is oriented to person, place, and time. He displays no weakness.   Psychiatric: His behavior is normal. Mood, judgment and thought content normal.   Vitals reviewed.    I have reexamined the patient and the results are consistent with the previously documented exam. Harika Hein MD      Lab on 01/21/2025   Component Date Value Ref Range Status    Ferritin 01/21/2025 73.60  30.00 - 400.00 ng/mL Final    Iron 01/21/2025 107  59 - 158 mcg/dL Final    Iron Saturation (TSAT) 01/21/2025 34  20 - 50 % Final    Transferrin 01/21/2025 209  200 - 360 mg/dL Final    TIBC 01/21/2025 311  298 - 536 mcg/dL Final    WBC 01/21/2025 7.42  3.40 - 10.80 10*3/mm3 Final    RBC 01/21/2025 4.82  4.14 - 5.80 10*6/mm3 Final    Hemoglobin 01/21/2025 14.6  13.0 - 17.7 g/dL Final    Hematocrit 01/21/2025 45.3  37.5 - 51.0 % " Final    MCV 01/21/2025 94.0  79.0 - 97.0 fL Final    MCH 01/21/2025 30.3  26.6 - 33.0 pg Final    MCHC 01/21/2025 32.2  31.5 - 35.7 g/dL Final    RDW 01/21/2025 12.5  12.3 - 15.4 % Final    RDW-SD 01/21/2025 43.4  37.0 - 54.0 fl Final    MPV 01/21/2025 9.2  6.0 - 12.0 fL Final    Platelets 01/21/2025 227  140 - 450 10*3/mm3 Final    Neutrophil % 01/21/2025 58.6  42.7 - 76.0 % Final    Lymphocyte % 01/21/2025 28.0  19.6 - 45.3 % Final    Monocyte % 01/21/2025 8.9  5.0 - 12.0 % Final    Eosinophil % 01/21/2025 3.1  0.3 - 6.2 % Final    Basophil % 01/21/2025 1.3  0.0 - 1.5 % Final    Immature Grans % 01/21/2025 0.1  0.0 - 0.5 % Final    Neutrophils, Absolute 01/21/2025 4.34  1.70 - 7.00 10*3/mm3 Final    Lymphocytes, Absolute 01/21/2025 2.08  0.70 - 3.10 10*3/mm3 Final    Monocytes, Absolute 01/21/2025 0.66  0.10 - 0.90 10*3/mm3 Final    Eosinophils, Absolute 01/21/2025 0.23  0.00 - 0.40 10*3/mm3 Final    Basophils, Absolute 01/21/2025 0.10  0.00 - 0.20 10*3/mm3 Final    Immature Grans, Absolute 01/21/2025 0.01  0.00 - 0.05 10*3/mm3 Final    nRBC 01/21/2025 0.0  0.0 - 0.2 /100 WBC Final        No radiology results for the last 30 days.     1/4/2024-WBC 7.5, hemoglobin 14.7, platelets 249,000  Ferritin 95.7, iron profile reviewed and iron saturation 28%, TIBC 305    Assessment & Plan      Mr. Holder is a 76-year-old  male referred for further work-up of anemia.  On reviewing the labs he is noted to have low hemoglobin since May 2019.  Ferritin was normal in the past however most recent ferritin from 7/8/2020 noted to be low at 12.  This is indicative of iron deficiency.  He has been on oral iron and has had a good response.    1.anemia  Work-up consistent with iron deficiency   Had a colonoscopy in December 2020 which does not show any evidence of bleeding  EGD June 2021 with esophagitis and Castellon's esophagus.  He had some hemorrhoidal bleeding with stopped about 3 months ago.  He continues on oral iron  every other day.  Hemoglobin today is normal at 14.1.  Ferritin normal at 36.1, iron profile shows an iron saturation of 21% and TIBC 337  Has not been on oral iron for about 4 to 5 months now.  Hemoglobin decreased to 12.1 and therefore oral iron resumed.  Repeat EGD and C-scope 5/31/2022 without any evidence of bleeding  Continue oral iron.  Recommend taking 2 daily.  1/5/2023: Taking oral iron 2 tablets every other day, tolerating well.  Hemoglobin today 14.6, ferritin 72.3, iron saturation 27%, TIBC 329.  Capsule endoscopy with GI 11/2022 came back normal, no source of anemia identified.  3/30/2023-CBC reviewed and WBC 7.97, hemoglobin 14.3 and platelets 215,000.  Iron saturation 25%, TIBC 289.  Ferritin 112.3  7/11/2024: Continues on oral iron 2 tablets every other day.  Hemoglobin 14.2, ferritin 95.5, iron saturation 26%, TIBC 304.  1/21/2025-iron studies reviewed and normal, hemoglobin 14.6.  Continue annual iron studies and physical exam at our office.    2.Hypertension-blood pressure well controlled, continue current medications.  BP: 147/74     3.GI work-up for iron deficiency anemia-with C-scope and EGD performed 5/31/2022 negative.  Capsule endoscopy with GI 11/28/2022 came back normal, no source of anemia.  Heme globin remains normal at 14.6  Due for repeat colonoscopy and EGD in summer 2025.    4.Hyperlipidemia , continue atorvastatin, stable    5.Mood-continue Zoloft, stable    6.Obesity-BMI 37.9    PLAN:    Continue oral iron 2 tablets every other day.  1 year with JEFF, iron studies, CBC, 2 years with MD and iron studies and CBC.    Harika Hein MD  01/21/25

## 2025-01-30 ENCOUNTER — OFFICE VISIT (OUTPATIENT)
Dept: FAMILY MEDICINE CLINIC | Facility: CLINIC | Age: 80
End: 2025-01-30
Payer: MEDICARE

## 2025-01-30 VITALS
HEIGHT: 66 IN | WEIGHT: 233 LBS | OXYGEN SATURATION: 98 % | SYSTOLIC BLOOD PRESSURE: 130 MMHG | BODY MASS INDEX: 37.45 KG/M2 | TEMPERATURE: 97.5 F | HEART RATE: 62 BPM | DIASTOLIC BLOOD PRESSURE: 73 MMHG

## 2025-01-30 DIAGNOSIS — Z00.00 MEDICARE ANNUAL WELLNESS VISIT, SUBSEQUENT: Primary | ICD-10-CM

## 2025-01-30 DIAGNOSIS — F33.42 RECURRENT MAJOR DEPRESSIVE DISORDER, IN FULL REMISSION: Chronic | ICD-10-CM

## 2025-01-30 DIAGNOSIS — I10 ESSENTIAL HYPERTENSION: Chronic | ICD-10-CM

## 2025-01-30 DIAGNOSIS — Z12.11 ENCOUNTER FOR SCREENING FOR MALIGNANT NEOPLASM OF COLON: ICD-10-CM

## 2025-01-30 DIAGNOSIS — E03.8 SUBCLINICAL HYPOTHYROIDISM: Chronic | ICD-10-CM

## 2025-01-30 DIAGNOSIS — E78.01 FAMILIAL HYPERCHOLESTEROLEMIA: Chronic | ICD-10-CM

## 2025-01-30 PROCEDURE — 3078F DIAST BP <80 MM HG: CPT | Performed by: FAMILY MEDICINE

## 2025-01-30 PROCEDURE — G0439 PPPS, SUBSEQ VISIT: HCPCS | Performed by: FAMILY MEDICINE

## 2025-01-30 PROCEDURE — 99214 OFFICE O/P EST MOD 30 MIN: CPT | Performed by: FAMILY MEDICINE

## 2025-01-30 PROCEDURE — 1170F FXNL STATUS ASSESSED: CPT | Performed by: FAMILY MEDICINE

## 2025-01-30 PROCEDURE — 3075F SYST BP GE 130 - 139MM HG: CPT | Performed by: FAMILY MEDICINE

## 2025-01-30 PROCEDURE — 1126F AMNT PAIN NOTED NONE PRSNT: CPT | Performed by: FAMILY MEDICINE

## 2025-01-30 NOTE — ASSESSMENT & PLAN NOTE
Orders:    CBC & Differential; Future    Comprehensive Metabolic Panel; Future    Lipid Panel; Future    TSH Rfx On Abnormal To Free T4; Future

## 2025-01-30 NOTE — PROGRESS NOTES
Subjective   The ABCs of the Annual Wellness Visit  Medicare Wellness Visit      Dick Galindo is a 79 y.o. patient who presents for a Medicare Wellness Visit.    The following portions of the patient's history were reviewed and   updated as appropriate: allergies, current medications, past family history, past medical history, past social history, past surgical history, and problem list.    Compared to one year ago, the patient's physical   health is the same.  Compared to one year ago, the patient's mental   health is the same.    Recent Hospitalizations:  He was not admitted to the hospital during the last year.     Current Medical Providers:  Patient Care Team:  Mynor Russ MD as PCP - General (Family Medicine)  Mynor Russ MD as Referring Physician (Family Medicine)  Kajal Bean MD as Surgeon (General Surgery)  Harika Hein MD as Consulting Physician (Hematology and Oncology)    Outpatient Medications Prior to Visit   Medication Sig Dispense Refill    acetaminophen (TYLENOL) 325 MG tablet Take 2 tablets by mouth Every 6 (Six) Hours As Needed.      albuterol sulfate HFA (Ventolin HFA) 108 (90 Base) MCG/ACT inhaler 2 puffs Q4H for cough and wheeze 18 g 1    amLODIPine (NORVASC) 10 MG tablet Take 1 tablet by mouth Daily. 90 tablet 1    atorvastatin (LIPITOR) 40 MG tablet Take 1 tablet by mouth Daily. 90 tablet 1    carvedilol (COREG) 6.25 MG tablet Take 1 tablet by mouth 2 (Two) Times a Day With Meals. 180 tablet 1    hydroCHLOROthiazide 12.5 MG tablet Take 1 tablet by mouth Daily. 90 tablet 1    levothyroxine (SYNTHROID, LEVOTHROID) 75 MCG tablet Take 1 tablet by mouth Daily. 90 tablet 1    lisinopril (PRINIVIL,ZESTRIL) 40 MG tablet Take 1 tablet by mouth Daily. 90 tablet 1    sertraline (ZOLOFT) 50 MG tablet Take 1 tablet by mouth Daily. 90 tablet 1     Facility-Administered Medications Prior to Visit   Medication Dose Route Frequency Provider Last Rate Last Admin    famotidine (PEPCID)  "tablet 20 mg  20 mg Oral BID Usman Mcgill MD         No opioid medication identified on active medication list. I have reviewed chart for other potential  high risk medication/s and harmful drug interactions in the elderly.      Aspirin is not on active medication list.  Aspirin use is not indicated based on review of current medical condition/s. Risk of harm outweighs potential benefits.  .    Patient Active Problem List   Diagnosis    Spinal stenosis of lumbar region    Essential hypertension    Lower urinary tract symptoms (LUTS)    Familial hypercholesterolemia    Recurrent major depressive disorder, in full remission    Hyperplastic colon polyp    Gastroesophageal reflux disease without esophagitis    Subclinical hypothyroidism    Severe ROMINA on auto CPAP    Hypoxemia associated with sleep    Bilateral renal cysts    Class 2 severe obesity due to excess calories with serious comorbidity and body mass index (BMI) of 38.0 to 38.9 in adult    Hypersomnia due to medical condition    BPH with obstruction/lower urinary tract symptoms    Colon polyp    Iron deficiency anemia    History of acute gastritis    Castellon's esophagus with dysplasia    Heme positive stool     Advance Care Planning Advance Directive is on file.  ACP discussion was held with the patient during this visit. Patient has an advance directive in EMR which is still valid.             Objective   Vitals:    01/30/25 0954   BP: 130/73   Pulse: 62   Temp: 97.5 °F (36.4 °C)   TempSrc: Temporal   SpO2: 98%   Weight: 106 kg (233 lb)   Height: 167 cm (65.75\")       Estimated body mass index is 37.89 kg/m² as calculated from the following:    Height as of this encounter: 167 cm (65.75\").    Weight as of this encounter: 106 kg (233 lb).    Class 2 Severe Obesity (BMI >=35 and <=39.9). Obesity-related health conditions include the following: hypertension. Obesity is unchanged. BMI is is above average; BMI management plan is completed. We discussed " increasing exercise.           Does the patient have evidence of cognitive impairment? No  Lab Results   Component Value Date    CHLPL 145 2025    TRIG 91 2025    HDL 55 2025    LDL 73 2025    VLDL 17 2025                                                                                                Health  Risk Assessment    Smoking Status:  Social History     Tobacco Use   Smoking Status Former    Current packs/day: 0.00    Average packs/day: 2.0 packs/day for 14.0 years (28.0 ttl pk-yrs)    Types: Cigarettes    Start date: 1966    Quit date: 1980    Years since quittin.1    Passive exposure: Past   Smokeless Tobacco Never   Tobacco Comments    He smoked between the ages of 18 and 32.     Alcohol Consumption:  Social History     Substance and Sexual Activity   Alcohol Use Yes    Alcohol/week: 4.0 standard drinks of alcohol    Types: 4 Glasses of wine per week    Comment: occ.       Fall Risk Screen  BRISA Fall Risk Assessment was completed, and patient is at LOW risk for falls.Assessment completed on:2025    Depression Screening   Little interest or pleasure in doing things? Not at all   Feeling down, depressed, or hopeless? Not at all   PHQ-2 Total Score 0      Health Habits and Functional and Cognitive Screenin/29/2025    10:08 AM   Functional & Cognitive Status   Do you have difficulty preparing food and eating? No    Do you have difficulty bathing yourself, getting dressed or grooming yourself? No    Do you have difficulty using the toilet? No    Do you have difficulty moving around from place to place? No    Do you have trouble with steps or getting out of a bed or a chair? No    Current Diet Well Balanced Diet    Dental Exam Up to date    Eye Exam Up to date    Exercise (times per week) 3 times per week    Current Exercises Include Walking    Do you need help using the phone?  No    Are you deaf or do you have serious difficulty hearing?  No    Do you  need help to go to places out of walking distance? No    Do you need help shopping? No    Do you need help preparing meals?  No    Do you need help with housework?  No    Do you need help with laundry? No    Do you need help taking your medications? No    Do you need help managing money? No    Do you ever drive or ride in a car without wearing a seat belt? No    Have you felt unusual stress, anger or loneliness in the last month? No    Who do you live with? Spouse    If you need help, do you have trouble finding someone available to you? No    Have you been bothered in the last four weeks by sexual problems? No    Do you have difficulty concentrating, remembering or making decisions? No        Patient-reported           Age-appropriate Screening Schedule:  Refer to the list below for future screening recommendations based on patient's age, sex and/or medical conditions. Orders for these recommended tests are listed in the plan section. The patient has been provided with a written plan.    Health Maintenance List  Health Maintenance   Topic Date Due    COVID-19 Vaccine (4 - 2024-25 season) 09/01/2024    ANNUAL WELLNESS VISIT  01/26/2025    COLORECTAL CANCER SCREENING  05/31/2025    GASTROSCOPY (EGD)  05/31/2025    LIPID PANEL  01/23/2026    BMI FOLLOWUP  01/30/2026    TDAP/TD VACCINES (2 - Td or Tdap) 01/18/2033    HEPATITIS C SCREENING  Completed    RSV Vaccine - Adults  Completed    INFLUENZA VACCINE  Completed    Pneumococcal Vaccine 65+  Completed    ZOSTER VACCINE  Completed                                                                                                                                                CMS Preventative Services Quick Reference  Risk Factors Identified During Encounter  Immunizations Discussed/Encouraged: COVID19    The above risks/problems have been discussed with the patient.  Pertinent information has been shared with the patient in the After Visit Summary.  An After Visit Summary  "and PPPS were made available to the patient.    Follow Up:   Next Medicare Wellness visit to be scheduled in 1 year.         Additional E&M Note during same encounter follows:  Patient has additional, significant, and separately identifiable condition(s)/problem(s) that require work above and beyond the Medicare Wellness Visit     Chief Complaint  Medicare Wellness-subsequent    Subjective   HPI            Hypertension.  Patient continues amlodipine carvedilol hydrochlorothiazide and lisinopril.  Creatinine and potassium normal.  His blood pressure is overall adequately controlled today.    Hyperlipidemia.  Continues atorvastatin 40 mg daily.    Hypothyroidism.  Recent TSH therapeutic.  Continues levothyroxine 75 mcg a day.    Major depression.  Continue sertraline maintenance.  50 mg daily.      Objective   Vital Signs:  /73   Pulse 62   Temp 97.5 °F (36.4 °C) (Temporal)   Ht 167 cm (65.75\")   Wt 106 kg (233 lb)   SpO2 98%   BMI 37.89 kg/m²   Physical Exam  Constitutional:       Appearance: Normal appearance. He is obese.   HENT:      Head: Atraumatic.      Mouth/Throat:      Pharynx: Oropharynx is clear. No oropharyngeal exudate or posterior oropharyngeal erythema.   Eyes:      Conjunctiva/sclera: Conjunctivae normal.   Neck:      Thyroid: No thyroid mass, thyromegaly or thyroid tenderness.   Cardiovascular:      Rate and Rhythm: Normal rate and regular rhythm.      Pulses: Normal pulses.      Heart sounds: Normal heart sounds.   Pulmonary:      Effort: Pulmonary effort is normal.      Breath sounds: Normal breath sounds.   Abdominal:      General: Abdomen is flat. There is no distension.      Palpations: Abdomen is soft. There is no mass.      Tenderness: There is no abdominal tenderness.      Hernia: No hernia is present.   Musculoskeletal:         General: Normal range of motion.      Cervical back: Normal range of motion and neck supple. No muscular tenderness.   Lymphadenopathy:      Cervical: No " cervical adenopathy.   Skin:     General: Skin is warm and dry.      Findings: No rash.   Neurological:      General: No focal deficit present.      Mental Status: He is alert and oriented to person, place, and time.   Psychiatric:         Mood and Affect: Mood normal.         The following data was reviewed by: Mynor Russ MD on 01/30/2025:    Common labs          7/17/2024    09:23 1/21/2025    09:46 1/23/2025    10:02   Common Labs   Glucose 98   93    BUN 22   20    Creatinine 1.19   1.09    Sodium 140   141    Potassium 4.3   4.5    Chloride 98   101    Calcium 9.3   9.9    Total Protein 7.1   6.9    Albumin 4.4   4.0    Total Bilirubin 0.6   0.5    Alkaline Phosphatase 90   85    AST (SGOT) 26   26    ALT (SGPT) 20   26    WBC 7.71  7.42  7.77    Hemoglobin 15.2  14.6  14.8    Hematocrit 45.3  45.3  47.0    Platelets 248  227  234    Total Cholesterol 135   145    Triglycerides 93   91    HDL Cholesterol 52   55    LDL Cholesterol  65   73      TSH          1/23/2025    10:02   TSH   TSH 3.830              Assessment and Plan     Annual healthcare maintenance visit Medicare wellness visit.    Immunizations reviewed.  COVID vaccination recommended at retail pharmacy.    Hypertension.  Well-controlled.  Continue amlodipine, carvedilol, hydrochlorothiazide, and lisinopril.    Hyperlipidemia.  Lipid panel reviewed.  Liver enzymes normal.  Continue atorvastatin.  No adverse effects.    Hypothyroidism.  TSH therapeutic.  Continue levothyroxine 7 5 mcg.    Major depression.  In remission.  Continue on sertraline 50 mg daily.    I reviewed records.  He had a AAA screen a few years ago that was normal size.  He quit smoking many years ago.  No indication for repeat.  He also had a CT scan of the abdomen and pelvis done at that time.  Because of some renal cysts.  The radiologist recommended further evaluation.  He was sent to urology.  He is been followed with the urologist since.  He also had a TURP done a few  years ago.  No complaints today.    See me back in 6 months for recheck.  Sooner as needed.    Colon cancer screening.  He is coming due for 3-year colonoscopy because of previous polyps.  His previous gastroenterologist retired.  I am sending him to his surgeon he saw in the past for hemorrhoids.  Referral for colonoscopy placed.       Medicare annual wellness visit, subsequent         Essential hypertension      Orders:    CBC & Differential; Future    Comprehensive Metabolic Panel; Future    Lipid Panel; Future    TSH Rfx On Abnormal To Free T4; Future    Familial hypercholesterolemia       Orders:    CBC & Differential; Future    Comprehensive Metabolic Panel; Future    Lipid Panel; Future    TSH Rfx On Abnormal To Free T4; Future    Recurrent major depressive disorder, in full remission      Orders:    CBC & Differential; Future    Comprehensive Metabolic Panel; Future    Lipid Panel; Future    TSH Rfx On Abnormal To Free T4; Future    Subclinical hypothyroidism    Orders:    CBC & Differential; Future    Comprehensive Metabolic Panel; Future    Lipid Panel; Future    TSH Rfx On Abnormal To Free T4; Future    Encounter for screening for malignant neoplasm of colon    Orders:    Ambulatory Referral For Screening Colonoscopy            Follow Up   No follow-ups on file.  Patient was given instructions and counseling regarding his condition or for health maintenance advice. Please see specific information pulled into the AVS if appropriate.

## 2025-02-17 RX ORDER — ATORVASTATIN CALCIUM 40 MG/1
40 TABLET, FILM COATED ORAL DAILY
Qty: 90 TABLET | Refills: 1 | Status: SHIPPED | OUTPATIENT
Start: 2025-02-17

## 2025-02-17 NOTE — TELEPHONE ENCOUNTER
Rx Refill Note  Requested Prescriptions     Pending Prescriptions Disp Refills    atorvastatin (LIPITOR) 40 MG tablet 90 tablet 1     Sig: Take 1 tablet by mouth Daily.      Last office visit with prescribing clinician: 1/30/2025   Last telemedicine visit with prescribing clinician: Visit date not found   Next office visit with prescribing clinician: 7/30/2025                         Would you like a call back once the refill request has been completed: [] Yes [] No    If the office needs to give you a call back, can they leave a voicemail: [] Yes [] No    Cindy Morillo MA  02/17/25, 09:19 EST

## 2025-03-10 RX ORDER — LISINOPRIL 40 MG/1
40 TABLET ORAL DAILY
Qty: 90 TABLET | Refills: 1 | Status: SHIPPED | OUTPATIENT
Start: 2025-03-10

## 2025-03-10 NOTE — TELEPHONE ENCOUNTER
Rx Refill Note  Requested Prescriptions     Pending Prescriptions Disp Refills    lisinopril (PRINIVIL,ZESTRIL) 40 MG tablet 90 tablet 1     Sig: Take 1 tablet by mouth Daily.      Last office visit with prescribing clinician: 1/30/2025   Last telemedicine visit with prescribing clinician: Visit date not found   Next office visit with prescribing clinician: 7/30/2025                         Would you like a call back once the refill request has been completed: [] Yes [] No    If the office needs to give you a call back, can they leave a voicemail: [] Yes [] No    Cindy Morillo MA  03/10/25, 08:54 EDT

## 2025-03-13 ENCOUNTER — OFFICE VISIT (OUTPATIENT)
Dept: SURGERY | Facility: CLINIC | Age: 80
End: 2025-03-13
Payer: MEDICARE

## 2025-03-13 VITALS
DIASTOLIC BLOOD PRESSURE: 75 MMHG | HEIGHT: 66 IN | BODY MASS INDEX: 37.45 KG/M2 | SYSTOLIC BLOOD PRESSURE: 130 MMHG | HEART RATE: 62 BPM | OXYGEN SATURATION: 95 % | WEIGHT: 233 LBS

## 2025-03-13 DIAGNOSIS — Z87.19 HISTORY OF BARRETT'S ESOPHAGUS: ICD-10-CM

## 2025-03-13 DIAGNOSIS — Z86.0101 HISTORY OF ADENOMATOUS POLYP OF COLON: Primary | ICD-10-CM

## 2025-03-13 NOTE — PROGRESS NOTES
ASSESSMENT/PLAN:    79-year-old gentleman in need of a colonoscopy due to a history of tubular adenomatous polyps as well as an EGD due to a history of Castellon's esophagus without dysplasia.  I therefore recommended proceeding with both scopes and he is willing to do so.  Risks and rationale were discussed with him with the risk including but not limited to bleeding, infection, bowel perforation and the need for additional procedures.  Any additional recommendations will be given once the results of been reviewed.  All questions were answered and he was willing to proceed with all recommendations.    CC:     History of Castellon's esophagus and colon polyps    HPI:    This is a 79-year-old gentleman presenting to the office today at the request of Dr. Mynor Russ for consultation.  He has a history significant for tubular adenomatous polyps on previous colonoscopies with his last colonoscopy being in 2022 with a 3 to 5-year follow-up being recommended.  Additionally he underwent an EGD at that time as he had a previous history of Castellon's esophagus and was again redemonstrated to have Castellon's esophagus with no dysplasia.  Currently he denies having significant reflux, having this well-controlled with Pepcid twice daily.  He denies having nausea, vomiting, abdominal distention, abdominal pain, dark tarry stools or bright red blood with his bowel movements.    ENDOSCOPY:   Colonoscopy 2022 history adenomatous polyps  EGD 2022 history of Castellon's esophagus, gastritis and gastric polyps    SOCIAL HISTORY:   Denies tobacco use  Occasional alcohol use    FAMILY HISTORY:    Colorectal cancer: None    PREVIOUS ABDOMINAL SURGERY    TURP 2020    OTHER SURGERY  Past Surgical History:   Procedure Laterality Date    COLONOSCOPY N/A approx 2012    COLONOSCOPY N/A 02/06/2018    Procedure: COLONOSCOPY INTO CEECUM AND TERMINAL MILEUM WITH COLD BIOPSY POLYPECTOMIES & COLD BIOIPSIES;  Surgeon: Usman Roper MD;  Location: [facility]  ENDOSCOPY;  Service:     COLONOSCOPY N/A 12/03/2020    Procedure: COLONOSCOPY TO CECUM AND INTO TI WITH COLD BIOPSIES AND COLD BIOPSY POLYPECTOMY;  Surgeon: Usman Roper MD;  Location: [facility] ENDOSCOPY;  Service: Gastroenterology;  Laterality: N/A;  pre: history of colon polyps  post: diverticulosis, polyp, and internal hemorrhoids    COLONOSCOPY N/A 05/31/2022    Procedure: COLONOSCOPY to cecum and TI with biopsies and cold forcep polypectomies;  Surgeon: Usman Roper MD;  Location: [facility] ENDOSCOPY;  Service: Gastroenterology;  Laterality: N/A;  pre- hx of colon polyps, melena, iron deficiency anemia  post- diverticulosis, polyps, polyp stalk, internal hemorrhoids    CYSTOSCOPY TRANSURETHRAL RESECTION OF PROSTATE N/A 07/06/2020    Procedure: TRANSURETHRAL RESECTION OF PROSTATE;  Surgeon: Charanjit Cameron MD;  Location: [facility] MAIN OR;  Service: Urology;  Laterality: N/A;    ENDOSCOPY N/A 02/06/2018    Procedure: ESOPHAGOGASTRODUODENOSCOPY WITH COLD BIOPSIES;  Surgeon: Usman Roper MD;  Location: [facility] ENDOSCOPY;  Service:     ENDOSCOPY N/A 06/03/2021    Procedure: ESOPHAGOGASTRODUODENOSCOPY WITH COLD BIOPSIES;  Surgeon: Usman Roper MD;  Location: [facility] ENDOSCOPY;  Service: Gastroenterology;  Laterality: N/A;  PRE:IRON DEFICIENCY ANEMIA  POST: GASTRITIS    ENDOSCOPY N/A 05/31/2022    Procedure: ESOPHAGOGASTRODUODENOSCOPY with biopsies;  Surgeon: Usman Roper MD;  Location: [facility] ENDOSCOPY;  Service: Gastroenterology;  Laterality: N/A;  pre- Castellon's esophagus  post- gastritis, gastric polyps    PROSTATE SURGERY  2021    TONSILLECTOMY Bilateral 1954    UPPER GASTROINTESTINAL ENDOSCOPY         PAST MEDICAL HISTORY:    Past Medical History:   Diagnosis Date    Allergic     Anemia 12/1/22    Anxiety     Asthma     mild case, allergies    Castellon esophagus     Bronchitis     Colon polyps     Diverticulitis     Diverticulosis     Enlarged prostate     GERD (gastroesophageal reflux disease)     H/O bladder  "infections     H/O bronchitis     High cholesterol     History of anemia     Hypertension     Hypothyroidism     Seasonal allergies     Sleep apnea     CPAP    Slow urinary stream        MEDICATIONS:     Current Outpatient Medications:     acetaminophen (TYLENOL) 325 MG tablet, Take 2 tablets by mouth Every 6 (Six) Hours As Needed., Disp: , Rfl:     albuterol sulfate HFA (Ventolin HFA) 108 (90 Base) MCG/ACT inhaler, 2 puffs Q4H for cough and wheeze, Disp: 18 g, Rfl: 1    amLODIPine (NORVASC) 10 MG tablet, Take 1 tablet by mouth Daily., Disp: 90 tablet, Rfl: 1    atorvastatin (LIPITOR) 40 MG tablet, Take 1 tablet by mouth Daily., Disp: 90 tablet, Rfl: 1    carvedilol (COREG) 6.25 MG tablet, Take 1 tablet by mouth 2 (Two) Times a Day With Meals., Disp: 180 tablet, Rfl: 1    hydroCHLOROthiazide 12.5 MG tablet, Take 1 tablet by mouth Daily., Disp: 90 tablet, Rfl: 1    levothyroxine (SYNTHROID, LEVOTHROID) 75 MCG tablet, Take 1 tablet by mouth Daily., Disp: 90 tablet, Rfl: 1    lisinopril (PRINIVIL,ZESTRIL) 40 MG tablet, Take 1 tablet by mouth Daily., Disp: 90 tablet, Rfl: 1    sertraline (ZOLOFT) 50 MG tablet, Take 1 tablet by mouth Daily., Disp: 90 tablet, Rfl: 1    Current Facility-Administered Medications:     famotidine (PEPCID) tablet 20 mg, 20 mg, Oral, BID Jacquelin TRINH Kevin, MD    ALLERGIES:   Allergies   Allergen Reactions    Amoxicillin Itching and Swelling     Beta lactam allergy details  Antibiotic reaction: other (irching and rash on hands)  Age at reaction: adult  Dose to reaction time: unknown  Reason for antibiotic: other (sinus infection)  Epinephrine required for reaction?: unknown  Tolerated antibiotics: unknown        Levaquin [Levofloxacin] Nausea And Vomiting       PHYSICAL EXAM:   Constitutional: Well-developed well-nourished, no acute distress  Vital signs:   Height 65.75\"  Weight 233  BMI 37.9  Neck: Supple, no palpable mass, trachea midline  Respiratory: Clear to auscultation, normal inspiratory " effort  Cardiovascular: Regular rate, no murmur, no carotid bruit  Gastrointestinal: Soft, nontender  Psychiatric: Alert and oriented ×3, normal affect          Cy Gerber PA-C    [facility] - General Surgery  [address], [address]  [place] [address]    [address], [address]  [place] [address]    Office: [phone]  Fax: [phone]

## 2025-04-11 RX ORDER — FAMOTIDINE 20 MG/1
20 TABLET, FILM COATED ORAL 2 TIMES DAILY
COMMUNITY

## 2025-04-14 ENCOUNTER — ANESTHESIA (OUTPATIENT)
Dept: GASTROENTEROLOGY | Facility: HOSPITAL | Age: 80
End: 2025-04-14
Payer: MEDICARE

## 2025-04-14 ENCOUNTER — ANESTHESIA EVENT (OUTPATIENT)
Dept: GASTROENTEROLOGY | Facility: HOSPITAL | Age: 80
End: 2025-04-14
Payer: MEDICARE

## 2025-04-14 ENCOUNTER — HOSPITAL ENCOUNTER (OUTPATIENT)
Facility: HOSPITAL | Age: 80
Setting detail: HOSPITAL OUTPATIENT SURGERY
Discharge: HOME | End: 2025-04-14
Attending: STUDENT IN AN ORGANIZED HEALTH CARE EDUCATION/TRAINING PROGRAM | Admitting: STUDENT IN AN ORGANIZED HEALTH CARE EDUCATION/TRAINING PROGRAM
Payer: MEDICARE

## 2025-04-14 VITALS
HEIGHT: 66 IN | WEIGHT: 228 LBS | HEART RATE: 76 BPM | BODY MASS INDEX: 36.64 KG/M2 | OXYGEN SATURATION: 100 % | DIASTOLIC BLOOD PRESSURE: 76 MMHG | SYSTOLIC BLOOD PRESSURE: 152 MMHG | RESPIRATION RATE: 18 BRPM

## 2025-04-14 DIAGNOSIS — Z86.0101 HISTORY OF ADENOMATOUS POLYP OF COLON: ICD-10-CM

## 2025-04-14 DIAGNOSIS — Z87.19 HISTORY OF BARRETT'S ESOPHAGUS: ICD-10-CM

## 2025-04-14 PROCEDURE — 88305 TISSUE EXAM BY PATHOLOGIST: CPT | Performed by: STUDENT IN AN ORGANIZED HEALTH CARE EDUCATION/TRAINING PROGRAM

## 2025-04-14 PROCEDURE — 25010000002 PROPOFOL 1000 MG/100ML EMULSION

## 2025-04-14 PROCEDURE — 25010000002 GLYCOPYRROLATE 0.2 MG/ML SOLUTION

## 2025-04-14 PROCEDURE — 25010000002 PROPOFOL 200 MG/20ML EMULSION

## 2025-04-14 PROCEDURE — 25010000002 LIDOCAINE 2% SOLUTION

## 2025-04-14 PROCEDURE — 43239 EGD BIOPSY SINGLE/MULTIPLE: CPT | Performed by: STUDENT IN AN ORGANIZED HEALTH CARE EDUCATION/TRAINING PROGRAM

## 2025-04-14 PROCEDURE — 25810000003 LACTATED RINGERS PER 1000 ML: Performed by: STUDENT IN AN ORGANIZED HEALTH CARE EDUCATION/TRAINING PROGRAM

## 2025-04-14 PROCEDURE — 45380 COLONOSCOPY AND BIOPSY: CPT | Performed by: STUDENT IN AN ORGANIZED HEALTH CARE EDUCATION/TRAINING PROGRAM

## 2025-04-14 RX ORDER — SODIUM CHLORIDE, SODIUM LACTATE, POTASSIUM CHLORIDE, CALCIUM CHLORIDE 600; 310; 30; 20 MG/100ML; MG/100ML; MG/100ML; MG/100ML
30 INJECTION, SOLUTION INTRAVENOUS CONTINUOUS PRN
Status: DISCONTINUED | OUTPATIENT
Start: 2025-04-14 | End: 2025-04-14 | Stop reason: HOSPADM

## 2025-04-14 RX ORDER — PROPOFOL 10 MG/ML
INJECTION, EMULSION INTRAVENOUS AS NEEDED
Status: DISCONTINUED | OUTPATIENT
Start: 2025-04-14 | End: 2025-04-14 | Stop reason: SURG

## 2025-04-14 RX ORDER — PROPOFOL 10 MG/ML
INJECTION, EMULSION INTRAVENOUS CONTINUOUS PRN
Status: DISCONTINUED | OUTPATIENT
Start: 2025-04-14 | End: 2025-04-14 | Stop reason: SURG

## 2025-04-14 RX ORDER — GLYCOPYRROLATE 0.2 MG/ML
INJECTION INTRAMUSCULAR; INTRAVENOUS AS NEEDED
Status: DISCONTINUED | OUTPATIENT
Start: 2025-04-14 | End: 2025-04-14 | Stop reason: SURG

## 2025-04-14 RX ORDER — LIDOCAINE HYDROCHLORIDE 20 MG/ML
INJECTION, SOLUTION INFILTRATION; PERINEURAL AS NEEDED
Status: DISCONTINUED | OUTPATIENT
Start: 2025-04-14 | End: 2025-04-14 | Stop reason: SURG

## 2025-04-14 RX ADMIN — LIDOCAINE HYDROCHLORIDE 100 MG: 20 INJECTION, SOLUTION INFILTRATION; PERINEURAL at 14:28

## 2025-04-14 RX ADMIN — GLYCOPYRROLATE 0.1 MG: 0.2 INJECTION INTRAMUSCULAR; INTRAVENOUS at 14:28

## 2025-04-14 RX ADMIN — PROPOFOL 200 MCG/KG/MIN: 10 INJECTION, EMULSION INTRAVENOUS at 14:28

## 2025-04-14 RX ADMIN — PROPOFOL INJECTABLE EMULSION 50 MG: 10 INJECTION, EMULSION INTRAVENOUS at 14:28

## 2025-04-14 RX ADMIN — SODIUM CHLORIDE, POTASSIUM CHLORIDE, SODIUM LACTATE AND CALCIUM CHLORIDE 30 ML/HR: 600; 310; 30; 20 INJECTION, SOLUTION INTRAVENOUS at 13:34

## 2025-04-14 NOTE — DISCHARGE INSTRUCTIONS
For the next 24 hours patient needs to be with a responsible adult.    For 24 hours DO NOT drive, operate machinery, appliances, drink alcohol, make important decisions or sign legal documents.    Start with a light or bland diet if you are feeling sick to your stomach otherwise advance to regular diet as tolerated.    Follow recommendations on procedure report if provided by your doctor.    Call Dr Bean for problems [phone]    Problems may include but not limited to: large amounts of bleeding, trouble breathing, repeated vomiting, severe unrelieved pain, fever or chills.

## 2025-04-14 NOTE — OP NOTE
PREOPERATIVE DIAGNOSIS:  History of Csatellon's esophagus  Cecal polyp x 2    POSTOPERATIVE DIAGNOSIS AND FINDINGS:  Diverticulosis    PROCEDURE:  EGD with junction biopsy  Colonoscopy to cecum and terminal ileum with cold biopsy forcep x 2 of cecal polyp    SURGEON:  Kajal Santana MD    ANESTHESIA:  MAC    SPECIMEN(S):  Cecal polyp x 2  GE junction biopsy    DESCRIPTION:  In the decubitus position, the endoscope was passed through the oral cavity, down the esophagus, and into the stomach.  The stomach was insufflated and appeared normal.  The pylorus appeared normal and was entered.  The 1st and 2nd portion of the duodenum appeared normal with no abnormalities.  The scope was withdrawn.  There was no hiatal hernia.  The GE junction appeared normal but due to his history, biopsy was taken for evaluation of Castellon's esophagus.  The scope was slowly withdrawn.    In the decubitus position, a digital rectal exam was performed and was normal. The colonoscope was inserted under direct visualization of the lumen to the cecum, which was confirmed by visualization of the ileocecal valve and appendiceal orifice. The scope was then slowly withdrawn circumferentially examining all mucosal surfaces. Scattered diverticulosis seen throughout the sigmoid colon.  2 small less than 1 cm polyps were seen in the cecum that were completely removed with hot snare polypectomy.    The quality of the bowel preparation good.    The patient tolerated the procedure well.    RECOMMENDATION FOR FUTURE SURVEILLANCE:  To be determined based on polyp pathology and issued as separate report.    KAJAL SANTANA M.D.  General and Endoscopic Surgery  [facility]    [address] [place] [address], [address]  [place], [place], [address]  P: [phone]  F: [phone]

## 2025-04-14 NOTE — ANESTHESIA POSTPROCEDURE EVALUATION
Patient: Dick Galindo    Procedure Summary       Date: 04/14/25 Room / Location: [facility] ENDOSCOPY 4 /  [facility]    Anesthesia Start: 1422 Anesthesia Stop: 1455    Procedures:       COLONOSCOPY TO CECUM AND TI WITH COLD BIOPSY POLYPECTOMIES      ESOPHAGOGASTRODUODENOSCOPY WITH COLD BIOPSIES (Esophagus) Diagnosis:       History of adenomatous polyp of colon      History of Castellon's esophagus      (History of adenomatous polyp of colon [Z86.0101])      (History of Castellon's esophagus [Z87.19])    Surgeons: Kajal Bean MD Provider: Charanjit Kirkland MD    Anesthesia Type: MAC ASA Status: 3            Anesthesia Type: MAC    Vitals  Vitals Value Taken Time   /60 04/14/25 14:52   Temp     Pulse 67 04/14/25 15:02   Resp 19 04/14/25 14:52   SpO2 100 % 04/14/25 15:02   Vitals shown include unfiled device data.        Post Anesthesia Care and Evaluation    Patient location during evaluation: PACU  Patient participation: complete - patient participated  Level of consciousness: awake and alert  Pain management: adequate    Airway patency: patent  Anesthetic complications: No anesthetic complications    Cardiovascular status: acceptable  Respiratory status: acceptable  Hydration status: acceptable    Comments: --------------------            04/14/25               1452     --------------------   BP:       106/60     Pulse:      73       Resp:       19       SpO2:      99%      --------------------

## 2025-04-14 NOTE — ANESTHESIA PREPROCEDURE EVALUATION
Anesthesia Evaluation     Patient summary reviewed and Nursing notes reviewed                Airway   Mallampati: III  TM distance: <3 FB  Neck ROM: limited  Possible difficult intubation  Dental      Pulmonary - negative pulmonary ROS   (+) a smoker Former, asthma,sleep apnea on CPAP  Cardiovascular - negative cardio ROS    ECG reviewed  Rhythm: regular  Rate: normal    (+) hypertension, hyperlipidemia      Neuro/Psych- negative ROS  (+) psychiatric history Anxiety and Depression  GI/Hepatic/Renal/Endo - negative ROS   (+) morbid obesity, GERD, renal disease (renal cysts)-, thyroid problem hypothyroidism    Musculoskeletal (-) negative ROS    Abdominal    Substance History - negative use  (+) alcohol use     OB/GYN negative ob/gyn ROS         Other                    Anesthesia Plan    ASA 3     MAC     intravenous induction     Anesthetic plan, risks, benefits, and alternatives have been provided, discussed and informed consent has been obtained with: patient and spouse/significant other.    CODE STATUS:

## 2025-04-14 NOTE — H&P
GENERAL SURGERY HISTORY AND PHYSICAL     Summary:    Mr. Dick Galindo is a 79 y.o. gentleman here for EGD and colonoscopy.     Chief Complaint:    Castellon's esophagus without dysplasia  History of polyps    History of Present Illness:    Mr. Dick Galindo is a 79 y.o. gentleman here for colonoscopy.    Denies bleeding, rectal pain, weight loss.     Denies family history of colon cancer.     Allergies:   Allergies   Allergen Reactions    Amoxicillin Itching and Swelling     Beta lactam allergy details  Antibiotic reaction: other (irching and rash on hands)  Age at reaction: adult  Dose to reaction time: unknown  Reason for antibiotic: other (sinus infection)  Epinephrine required for reaction?: unknown  Tolerated antibiotics: unknown        Levaquin [Levofloxacin] Nausea And Vomiting       Medications:    Reviewed in Epic       Physical Exam:   Constitutional: Well-developed well-nourished, no acute distress  Eyes: Conjunctiva normal, sclera nonicteric  ENMT: Hearing grossly normal, oral mucosa moist  Neck: Supple, trachea midline  Respiratory: No increased work of breathing, normal inspiratory effort  Cardiovascular: Regular rate, no peripheral edema, no jugular venous distention  Gastrointestinal: Soft, nontender  Skin:  Warm, dry, no rash on visualized skin surfaces  Musculoskeletal: Symmetric strength, normal gait  Psychiatric: Alert and oriented ×3, normal affect          Kajal Bean M.D.  General and Endoscopic Surgery  [facility]    [address], [address]  [place], [place], [address]  P: [phone]  F: [phone]

## 2025-04-15 LAB
CYTO UR: NORMAL
LAB AP CASE REPORT: NORMAL
PATH REPORT.FINAL DX SPEC: NORMAL
PATH REPORT.GROSS SPEC: NORMAL

## 2025-04-21 ENCOUNTER — TELEPHONE (OUTPATIENT)
Dept: SURGERY | Facility: CLINIC | Age: 80
End: 2025-04-21
Payer: MEDICARE

## 2025-04-21 NOTE — TELEPHONE ENCOUNTER
----- Message from Kajal Santana sent at 4/18/2025  5:47 AM EDT -----  Regarding: colonoscopy results  ENDOSCOPY FOLLOW UP NOTECould you let Dick Galindo know that the biopsies from his/her colonoscopy returned as benign? La, could you please place him/her in c-scope recall? Can you also let him know he had no evidence of Castellon's esophagus on this biopsy.Procedure:4/14/2025EGD with junction biopsyColonoscopy to cecum and terminal ileum with cold biopsy forcep x 2 of cecal polypIndication:Screening, h/o Castellon's esophagus Pathology: Final Diagnosis1.  Gastroesophageal junction, biopsy:             - Benign reactive squamous mucosa without diagnostic abnormality. 2.  Cecum, biopsy:             - Tubular adenoma.             - Negative for high-grade dysplasia.Recommendations:5 years. Patient info sent for c-scope recall. KAJAL SANTANA M.D.General and Endoscopic SurgeryBaptist Surgical Gptgle[facility]zf[address], [address] [place] [address], [id number]: [phone]: [phone]

## 2025-04-28 RX ORDER — CARVEDILOL 6.25 MG/1
6.25 TABLET ORAL 2 TIMES DAILY WITH MEALS
Qty: 180 TABLET | Refills: 1 | Status: SHIPPED | OUTPATIENT
Start: 2025-04-28

## 2025-04-28 RX ORDER — AMLODIPINE BESYLATE 10 MG/1
10 TABLET ORAL DAILY
Qty: 90 TABLET | Refills: 1 | Status: SHIPPED | OUTPATIENT
Start: 2025-04-28

## 2025-04-28 RX ORDER — LEVOTHYROXINE SODIUM 75 UG/1
75 TABLET ORAL DAILY
Qty: 90 TABLET | Refills: 1 | Status: SHIPPED | OUTPATIENT
Start: 2025-04-28

## 2025-04-28 RX ORDER — LISINOPRIL 40 MG/1
40 TABLET ORAL DAILY
Qty: 90 TABLET | Refills: 1 | Status: SHIPPED | OUTPATIENT
Start: 2025-04-28

## 2025-04-28 RX ORDER — HYDROCHLOROTHIAZIDE 12.5 MG/1
12.5 TABLET ORAL DAILY
Qty: 90 TABLET | Refills: 1 | Status: SHIPPED | OUTPATIENT
Start: 2025-04-28

## 2025-04-28 NOTE — TELEPHONE ENCOUNTER
Rx Refill Note  Requested Prescriptions     Pending Prescriptions Disp Refills    hydroCHLOROthiazide 12.5 MG tablet 90 tablet 1     Sig: Take 1 tablet by mouth Daily.    levothyroxine (SYNTHROID, LEVOTHROID) 75 MCG tablet 90 tablet 1     Sig: Take 1 tablet by mouth Daily.    carvedilol (COREG) 6.25 MG tablet 180 tablet 1     Sig: Take 1 tablet by mouth 2 (Two) Times a Day With Meals.    amLODIPine (NORVASC) 10 MG tablet 90 tablet 1     Sig: Take 1 tablet by mouth Daily.    lisinopril (PRINIVIL,ZESTRIL) 40 MG tablet 90 tablet 1     Sig: Take 1 tablet by mouth Daily.      Last office visit with prescribing clinician: 1/30/2025   Last telemedicine visit with prescribing clinician: Visit date not found   Next office visit with prescribing clinician: 7/30/2025                         Would you like a call back once the refill request has been completed: [] Yes [] No    If the office needs to give you a call back, can they leave a voicemail: [] Yes [] No    Abhay Colbert MA  04/28/25, 08:19 EDT

## 2025-07-31 ENCOUNTER — OFFICE VISIT (OUTPATIENT)
Dept: FAMILY MEDICINE CLINIC | Facility: CLINIC | Age: 80
End: 2025-07-31
Payer: MEDICARE

## 2025-07-31 VITALS
HEART RATE: 68 BPM | HEIGHT: 66 IN | OXYGEN SATURATION: 94 % | DIASTOLIC BLOOD PRESSURE: 70 MMHG | SYSTOLIC BLOOD PRESSURE: 136 MMHG | BODY MASS INDEX: 37.78 KG/M2 | WEIGHT: 235.1 LBS | TEMPERATURE: 97.7 F

## 2025-07-31 DIAGNOSIS — E03.8 SUBCLINICAL HYPOTHYROIDISM: ICD-10-CM

## 2025-07-31 DIAGNOSIS — R73.01 IMPAIRED FASTING GLUCOSE: ICD-10-CM

## 2025-07-31 DIAGNOSIS — E78.01 FAMILIAL HYPERCHOLESTEROLEMIA: ICD-10-CM

## 2025-07-31 DIAGNOSIS — F33.42 RECURRENT MAJOR DEPRESSIVE DISORDER, IN FULL REMISSION: ICD-10-CM

## 2025-07-31 DIAGNOSIS — I10 ESSENTIAL HYPERTENSION: Primary | ICD-10-CM

## 2025-07-31 NOTE — PROGRESS NOTES
"Chief Complaint  Hypertension    Subjective        Dick Galindo presents to [facility]  History of Present Illness    Hypertension follow-up. Running normal.  Continues on amlodipine, carvedilol, hydrochlorothiazide, and lisinopril.  Creatinine electrolytes are normal.  Glucose minimally elevated to 103.  No recent A1c levels.  His glucose has been normal on recent checks.    Hyperlipidemia.  Continues atorvastatin 40 mg daily.    Hypothyroidism.  Continue levothyroxine 75 mcg a day.    Major depression with anxiety.  Continues on sertraline 50 mg daily.  He states in the past anxiety has been the biggest issue.  And no trouble lately.    Objective   Vital Signs:  /70 (BP Location: Right arm, Patient Position: Sitting, Cuff Size: Large Adult)   Pulse 68   Temp 97.7 °F (36.5 °C) (Temporal)   Ht 167.6 cm (66\")   Wt 107 kg (235 lb 1.6 oz)   SpO2 94%   BMI 37.95 kg/m²   Estimated body mass index is 37.95 kg/m² as calculated from the following:    Height as of this encounter: 167.6 cm (66\").    Weight as of this encounter: 107 kg (235 lb 1.6 oz).            Physical Exam  Vitals and nursing note reviewed.   Constitutional:       General: He is not in acute distress.     Appearance: He is well-developed.   Cardiovascular:      Rate and Rhythm: Normal rate and regular rhythm.      Heart sounds: Normal heart sounds.   Pulmonary:      Effort: Pulmonary effort is normal.      Breath sounds: Normal breath sounds.   Skin:     General: Skin is warm and dry.   Psychiatric:         Mood and Affect: Mood normal.        Result Review :  The following data was reviewed by: Mynor Russ MD on 07/31/2025:  Common labs          1/21/2025    09:46 1/23/2025    10:02 7/23/2025    09:56   Common Labs   Glucose  93  103    BUN  20  21.0    Creatinine  1.09  1.18    Sodium  141  141    Potassium  4.5  4.6    Chloride  101  101    Calcium  9.9  9.8    Albumin  4.0  4.2    Total Bilirubin  0.5  " 0.6    Alkaline Phosphatase  85  81    AST (SGOT)  26  34    ALT (SGPT)  26  32    WBC 7.42  7.77  8.40    Hemoglobin 14.6  14.8  14.0    Hematocrit 45.3  47.0  42.7    Platelets 227  234  275    Total Cholesterol  145  154    Triglycerides  91  96    HDL Cholesterol  55  57    LDL Cholesterol   73  79      TSH          1/23/2025    10:02 7/23/2025    09:56   TSH   TSH 3.830  3.250                Assessment and Plan   Diagnoses and all orders for this visit:    1. Essential hypertension (Primary)  -     Hemoglobin A1c; Future  -     CBC & Differential; Future  -     Comprehensive Metabolic Panel; Future  -     Lipid Panel; Future  -     TSH+Free T4; Future    2. Familial hypercholesterolemia  -     Hemoglobin A1c; Future  -     CBC & Differential; Future  -     Comprehensive Metabolic Panel; Future  -     Lipid Panel; Future  -     TSH+Free T4; Future    3. Recurrent major depressive disorder, in full remission  -     Hemoglobin A1c; Future  -     CBC & Differential; Future  -     Comprehensive Metabolic Panel; Future  -     Lipid Panel; Future  -     TSH+Free T4; Future    4. Subclinical hypothyroidism  -     Hemoglobin A1c; Future  -     CBC & Differential; Future  -     Comprehensive Metabolic Panel; Future  -     Lipid Panel; Future  -     TSH+Free T4; Future    5. Impaired fasting glucose  -     Hemoglobin A1c; Future  -     CBC & Differential; Future  -     Comprehensive Metabolic Panel; Future  -     Lipid Panel; Future  -     TSH+Free T4; Future      Hypertension overall well-controlled.  Normal creatinine.  Normal electrolytes.  Continue same.  See me 6 months for Medicare wellness visit.    Anxiety and depression.  In remission.  Continues on maintenance sertraline without issue.    Subclinical hypothyroidism.  TSH is therapeutic.    Hyperlipidemia.  Continue statin.    Impaired fasting glucose.  Could be multiple causes.  Glucose minimally elevated 103.  Hydrochlorothiazide may be adding to this slightly.   Check A1c prior to next visit.  Maintain heart healthy diet.       Follow Up   Return in about 6 months (around 1/31/2026) for Subsequent Medicare Wellness Visit, Lab Visit One Week Prior to Next Appointment.  Patient was given instructions and counseling regarding his condition or for health maintenance advice. Please see specific information pulled into the AVS if appropriate.

## 2025-08-25 RX ORDER — ATORVASTATIN CALCIUM 40 MG/1
40 TABLET, FILM COATED ORAL DAILY
Qty: 90 TABLET | Refills: 1 | Status: SHIPPED | OUTPATIENT
Start: 2025-08-25

## 2025-08-27 ENCOUNTER — OFFICE VISIT (OUTPATIENT)
Dept: FAMILY MEDICINE CLINIC | Facility: CLINIC | Age: 80
End: 2025-08-27
Payer: MEDICARE

## 2025-08-27 VITALS
HEART RATE: 62 BPM | TEMPERATURE: 97.9 F | HEIGHT: 66 IN | BODY MASS INDEX: 37.66 KG/M2 | OXYGEN SATURATION: 96 % | RESPIRATION RATE: 21 BRPM | SYSTOLIC BLOOD PRESSURE: 124 MMHG | WEIGHT: 234.3 LBS | DIASTOLIC BLOOD PRESSURE: 60 MMHG

## 2025-08-27 DIAGNOSIS — M54.31 SCIATICA OF RIGHT SIDE: Primary | ICD-10-CM

## 2025-08-27 RX ORDER — MELOXICAM 15 MG/1
15 TABLET ORAL DAILY PRN
Qty: 30 TABLET | Refills: 0 | Status: SHIPPED | OUTPATIENT
Start: 2025-08-27

## (undated) DEVICE — LOU TUR: Brand: MEDLINE INDUSTRIES, INC.

## (undated) DEVICE — TUBING, SUCTION, 1/4" X 10', STRAIGHT: Brand: MEDLINE

## (undated) DEVICE — THE TORRENT IRRIGATION SCOPE CONNECTOR IS USED WITH THE TORRENT IRRIGATION TUBING TO PROVIDE IRRIGATION FLUIDS SUCH AS STERILE WATER DURING GASTROINTESTINAL ENDOSCOPIC PROCEDURES WHEN USED IN CONJUNCTION WITH AN IRRIGATION PUMP (OR ELECTROSURGICAL UNIT).: Brand: TORRENT

## (undated) DEVICE — KT ORCA ORCAPOD DISP STRL

## (undated) DEVICE — ADAPT CLN BIOGUARD AIR/H2O DISP

## (undated) DEVICE — BITEBLOCK OMNI BLOC

## (undated) DEVICE — 1071 S-DRP URO STLE-GAMA 10/BX,4X/C: Brand: STERI-DRAPE™

## (undated) DEVICE — TUBING, SUCTION, 1/4" X 20', STRAIGHT: Brand: MEDLINE INDUSTRIES, INC.

## (undated) DEVICE — SINGLE-USE BIOPSY FORCEPS: Brand: RADIAL JAW 4

## (undated) DEVICE — LN SMPL CO2 SHTRM SD STREAM W/M LUER

## (undated) DEVICE — CANN O2 ETCO2 FITS ALL CONN CO2 SMPL A/ 7IN DISP LF

## (undated) DEVICE — GLV SURG BIOGEL LTX PF 7

## (undated) DEVICE — HF-RESECTION ELECTRODE PLASMALOOP LOOP, MEDIUM, 24 FR., 12°-30°, ESG TURIS: Brand: OLYMPUS

## (undated) DEVICE — TBG 02 CRUSH RESIST LF CLR 7FT

## (undated) DEVICE — TIDISHIELD UROLOGY DRAIN BAGS FROSTY VINYL STERILE FITS SIEMENS UROSKOP ACCESS 20 PER CASE: Brand: TIDISHIELD

## (undated) DEVICE — SENSR O2 OXIMAX FNGR A/ 18IN NONSTR

## (undated) DEVICE — FRCP BX RADJAW4 NDL 2.8 240CM LG OG BX40

## (undated) DEVICE — BLCK/BITE BLOX W/DENTL/RIM W/STRAP 54F

## (undated) DEVICE — CANNULA,ADULT,SOFT-TOUCH,7'TUBE,UC: Brand: PENDING

## (undated) DEVICE — Device: Brand: DEFENDO AIR/WATER/SUCTION AND BIOPSY VALVE

## (undated) DEVICE — MSK O2 NONREBRTHR W/VNT LF ADULT 7FT

## (undated) DEVICE — CATH FOL BARDEX HEMATURIA 3WY 24F 30CC

## (undated) DEVICE — MSK ENDO PORT O2 POM ELITE CURAPLEX A/

## (undated) DEVICE — CATH COUVALAIRE SIMPLASTIC 3WY 24F 30CC

## (undated) DEVICE — MSK PROC CURAPLEX O2 2/ADAPT 7FT

## (undated) DEVICE — MAT FLR ABSORBENT LG 4FT 10 2.5FT

## (undated) DEVICE — BAG,DRAINAGE,4L,A/R TOWER,LL,SLIDE TAP: Brand: MEDLINE